# Patient Record
Sex: MALE | Race: WHITE
[De-identification: names, ages, dates, MRNs, and addresses within clinical notes are randomized per-mention and may not be internally consistent; named-entity substitution may affect disease eponyms.]

---

## 2019-03-26 ENCOUNTER — HOSPITAL ENCOUNTER (OUTPATIENT)
Age: 74
End: 2019-03-26
Payer: MEDICARE

## 2019-03-26 VITALS — BODY MASS INDEX: 26.7 KG/M2

## 2019-03-26 DIAGNOSIS — I25.10: Primary | ICD-10-CM

## 2019-03-26 PROCEDURE — 93306 TTE W/DOPPLER COMPLETE: CPT

## 2019-08-06 ENCOUNTER — HOSPITAL ENCOUNTER (OUTPATIENT)
Dept: HOSPITAL 100 - PSN | Age: 74
Discharge: HOME | End: 2019-08-06
Payer: MEDICARE

## 2019-08-06 VITALS — BODY MASS INDEX: 26.7 KG/M2

## 2019-08-06 DIAGNOSIS — J44.9: Primary | ICD-10-CM

## 2019-08-06 PROCEDURE — 94729 DIFFUSING CAPACITY: CPT

## 2019-08-06 PROCEDURE — 94726 PLETHYSMOGRAPHY LUNG VOLUMES: CPT

## 2019-08-06 PROCEDURE — 94060 EVALUATION OF WHEEZING: CPT

## 2019-08-08 ENCOUNTER — HOSPITAL ENCOUNTER (OUTPATIENT)
Age: 74
End: 2019-08-08
Payer: MEDICARE

## 2019-08-08 VITALS — BODY MASS INDEX: 26.7 KG/M2

## 2019-08-08 VITALS — OXYGEN SATURATION: 97 % | HEART RATE: 87 BPM

## 2019-08-08 DIAGNOSIS — J44.9: Primary | ICD-10-CM

## 2019-08-08 PROCEDURE — 94618 PULMONARY STRESS TESTING: CPT

## 2020-07-09 ENCOUNTER — HOSPITAL ENCOUNTER (OUTPATIENT)
Dept: HOSPITAL 100 - PSN | Age: 75
Discharge: HOME | End: 2020-07-09
Payer: MEDICARE

## 2020-07-09 VITALS — BODY MASS INDEX: 25.9 KG/M2 | BODY MASS INDEX: 26.2 KG/M2

## 2020-07-09 DIAGNOSIS — J44.9: Primary | ICD-10-CM

## 2020-07-09 PROCEDURE — 94729 DIFFUSING CAPACITY: CPT

## 2020-07-09 PROCEDURE — 94726 PLETHYSMOGRAPHY LUNG VOLUMES: CPT

## 2020-07-09 PROCEDURE — 94060 EVALUATION OF WHEEZING: CPT

## 2020-07-20 ENCOUNTER — HOSPITAL ENCOUNTER (OUTPATIENT)
Dept: HOSPITAL 100 - PSN | Age: 75
Discharge: HOME | End: 2020-07-20
Payer: MEDICARE

## 2020-07-20 VITALS — HEART RATE: 90 BPM | OXYGEN SATURATION: 98 %

## 2020-07-20 VITALS — BODY MASS INDEX: 26.9 KG/M2

## 2020-07-20 DIAGNOSIS — J44.9: Primary | ICD-10-CM

## 2020-07-20 PROCEDURE — 94618 PULMONARY STRESS TESTING: CPT

## 2021-08-03 ENCOUNTER — HOSPITAL ENCOUNTER (OUTPATIENT)
Age: 76
End: 2021-08-03
Payer: MEDICARE

## 2021-08-03 VITALS — BODY MASS INDEX: 26.1 KG/M2

## 2021-08-03 DIAGNOSIS — E78.00: Primary | ICD-10-CM

## 2021-08-03 LAB
ALANINE AMINOTRANSFER ALT/SGPT: 26 U/L (ref 16–61)
ALBUMIN SERPL-MCNC: 3.4 G/DL (ref 3.2–5)
ALKALINE PHOSPHATASE: 63 U/L (ref 45–117)
AST(SGOT): 14 U/L (ref 15–37)
BILIRUB DIRECT SERPL-MCNC: 0.12 MG/DL (ref 0–0.3)
CHOLEST SERPL-MCNC: 175 MG/DL
GLOBULIN: 3.9 G/DL (ref 2.2–4.2)
TRIGLYCERIDES: 148 MG/DL
VLDLC SERPL-MCNC: 30 MG/DL (ref 5–40)

## 2021-08-03 PROCEDURE — 36415 COLL VENOUS BLD VENIPUNCTURE: CPT

## 2021-08-03 PROCEDURE — 80061 LIPID PANEL: CPT

## 2021-08-03 PROCEDURE — 80076 HEPATIC FUNCTION PANEL: CPT

## 2022-04-26 ENCOUNTER — HOSPITAL ENCOUNTER (OUTPATIENT)
Dept: HOSPITAL 100 - CVS | Age: 77
Discharge: HOME | End: 2022-04-26
Payer: MEDICARE

## 2022-04-26 DIAGNOSIS — I25.10: Primary | ICD-10-CM

## 2022-04-26 DIAGNOSIS — E78.00: ICD-10-CM

## 2022-04-26 DIAGNOSIS — Z95.1: ICD-10-CM

## 2022-04-26 DIAGNOSIS — I48.0: ICD-10-CM

## 2022-04-26 DIAGNOSIS — Z98.61: ICD-10-CM

## 2022-04-26 PROCEDURE — 93017 CV STRESS TEST TRACING ONLY: CPT

## 2022-04-26 PROCEDURE — 93306 TTE W/DOPPLER COMPLETE: CPT

## 2022-04-26 PROCEDURE — A9500 TC99M SESTAMIBI: HCPCS

## 2022-04-26 PROCEDURE — 78452 HT MUSCLE IMAGE SPECT MULT: CPT

## 2022-04-26 PROCEDURE — A4216 STERILE WATER/SALINE, 10 ML: HCPCS

## 2022-07-07 ENCOUNTER — HOSPITAL ENCOUNTER (OUTPATIENT)
Age: 77
Discharge: HOME | End: 2022-07-07
Payer: MEDICARE

## 2022-07-07 DIAGNOSIS — I25.10: Primary | ICD-10-CM

## 2022-07-07 DIAGNOSIS — R27.0: ICD-10-CM

## 2022-07-07 PROCEDURE — 93880 EXTRACRANIAL BILAT STUDY: CPT

## 2023-03-06 ENCOUNTER — TELEPHONE (OUTPATIENT)
Dept: PRIMARY CARE | Facility: CLINIC | Age: 78
End: 2023-03-06
Payer: MEDICARE

## 2023-03-06 DIAGNOSIS — I10 PRIMARY HYPERTENSION: Primary | ICD-10-CM

## 2023-03-06 RX ORDER — LISINOPRIL AND HYDROCHLOROTHIAZIDE 20; 25 MG/1; MG/1
1 TABLET ORAL DAILY
Qty: 90 TABLET | Refills: 3 | Status: SHIPPED | OUTPATIENT
Start: 2023-03-06 | End: 2023-12-29

## 2023-03-06 NOTE — TELEPHONE ENCOUNTER
Needing 90 day refill of lisinopril with instructions of taking a whole pill daily and would like it for a year sent to [facility].

## 2023-04-14 ENCOUNTER — TELEPHONE (OUTPATIENT)
Dept: PRIMARY CARE | Facility: CLINIC | Age: 78
End: 2023-04-14
Payer: MEDICARE

## 2023-04-14 DIAGNOSIS — M81.0 AGE RELATED OSTEOPOROSIS, UNSPECIFIED PATHOLOGICAL FRACTURE PRESENCE: ICD-10-CM

## 2023-04-14 RX ORDER — DENOSUMAB 60 MG/ML
60 INJECTION SUBCUTANEOUS ONCE
Qty: 1 ML | Refills: 0 | Status: SHIPPED | OUTPATIENT
Start: 2023-04-14 | End: 2023-07-06 | Stop reason: SDUPTHER

## 2023-04-14 RX ORDER — DENOSUMAB 60 MG/ML
60 INJECTION SUBCUTANEOUS ONCE
COMMUNITY
Start: 2019-11-07 | End: 2023-04-14 | Stop reason: SDUPTHER

## 2023-04-21 ENCOUNTER — CLINICAL SUPPORT (OUTPATIENT)
Dept: PRIMARY CARE | Facility: CLINIC | Age: 78
End: 2023-04-21
Payer: MEDICARE

## 2023-04-21 DIAGNOSIS — M81.0 OSTEOPOROSIS, UNSPECIFIED OSTEOPOROSIS TYPE, UNSPECIFIED PATHOLOGICAL FRACTURE PRESENCE: ICD-10-CM

## 2023-04-21 NOTE — PROGRESS NOTES
Prolia injection 60mg/ml given Subcu. Right upper arm  Pt. Tolerated well  Pt. Brings own med.    Lot  [id number]  Exp 7/31/2025  NDC [id number]

## 2023-05-08 ENCOUNTER — TELEPHONE (OUTPATIENT)
Dept: PRIMARY CARE | Facility: CLINIC | Age: 78
End: 2023-05-08
Payer: MEDICARE

## 2023-05-25 PROBLEM — K22.4 INEFFECTIVE ESOPHAGEAL MOTILITY: Status: ACTIVE | Noted: 2023-05-25

## 2023-05-25 PROBLEM — M47.816 LUMBAR FACET ARTHROPATHY: Status: ACTIVE | Noted: 2023-05-25

## 2023-05-25 PROBLEM — K21.9 GERD (GASTROESOPHAGEAL REFLUX DISEASE): Status: ACTIVE | Noted: 2023-05-25

## 2023-05-25 PROBLEM — L82.1 SEBORRHEIC KERATOSIS: Status: ACTIVE | Noted: 2023-05-25

## 2023-05-25 PROBLEM — R73.03 PREDIABETES: Status: ACTIVE | Noted: 2023-05-25

## 2023-05-25 PROBLEM — M47.812 CERVICAL SPONDYLOSIS: Status: ACTIVE | Noted: 2023-05-25

## 2023-05-25 PROBLEM — I48.91 ATRIAL FIBRILLATION (MULTI): Status: ACTIVE | Noted: 2023-05-25

## 2023-05-25 PROBLEM — M81.0 OSTEOPOROSIS: Status: ACTIVE | Noted: 2023-05-25

## 2023-05-25 PROBLEM — E78.49 FAMILIAL HYPERLIPIDEMIA: Status: ACTIVE | Noted: 2023-05-25

## 2023-05-25 PROBLEM — R13.10 DYSPHAGIA: Status: ACTIVE | Noted: 2023-05-25

## 2023-05-25 PROBLEM — J43.2 CENTRILOBULAR EMPHYSEMA (MULTI): Status: ACTIVE | Noted: 2023-05-25

## 2023-05-25 PROBLEM — M76.821 POSTERIOR TIBIAL TENDINITIS OF RIGHT LOWER EXTREMITY: Status: ACTIVE | Noted: 2023-05-25

## 2023-05-25 PROBLEM — R09.02 HYPOXEMIA: Status: ACTIVE | Noted: 2023-05-25

## 2023-05-25 PROBLEM — M21.40 FLAT FOOT: Status: ACTIVE | Noted: 2023-05-25

## 2023-05-25 PROBLEM — C44.91 BASAL CELL CARCINOMA: Status: ACTIVE | Noted: 2023-05-25

## 2023-05-25 PROBLEM — G25.0 ESSENTIAL TREMOR: Status: ACTIVE | Noted: 2023-05-25

## 2023-05-25 PROBLEM — J44.9 COPD, SEVERE (MULTI): Status: ACTIVE | Noted: 2023-05-25

## 2023-05-25 PROBLEM — H40.9 GLAUCOMA: Status: ACTIVE | Noted: 2023-05-25

## 2023-05-25 RX ORDER — CALCIUM CARBONATE 200(500)MG
1 TABLET,CHEWABLE ORAL 2 TIMES DAILY
COMMUNITY
Start: 2019-12-07 | End: 2023-07-06 | Stop reason: ALTCHOICE

## 2023-05-25 RX ORDER — LATANOPROST 50 UG/ML
1 SOLUTION/ DROPS OPHTHALMIC NIGHTLY
COMMUNITY
Start: 2019-12-07

## 2023-05-25 RX ORDER — PRAVASTATIN SODIUM 40 MG/1
1 TABLET ORAL DAILY
COMMUNITY
Start: 2019-11-18 | End: 2023-12-29

## 2023-05-25 RX ORDER — IPRATROPIUM BROMIDE 42 UG/1
2 SPRAY, METERED NASAL 3 TIMES DAILY
COMMUNITY
Start: 2023-05-24 | End: 2023-06-16 | Stop reason: ALTCHOICE

## 2023-05-25 RX ORDER — PANTOPRAZOLE SODIUM 40 MG/1
1 TABLET, DELAYED RELEASE ORAL DAILY
COMMUNITY
Start: 2019-12-05 | End: 2024-02-06 | Stop reason: SDUPTHER

## 2023-05-25 RX ORDER — BACLOFEN 20 MG
1 TABLET ORAL DAILY
COMMUNITY
Start: 2019-12-07

## 2023-05-25 RX ORDER — NITROGLYCERIN 0.4 MG/1
0.4 TABLET SUBLINGUAL EVERY 5 MIN PRN
COMMUNITY
Start: 2019-12-07

## 2023-05-25 RX ORDER — METOPROLOL SUCCINATE 25 MG/1
1 TABLET, EXTENDED RELEASE ORAL DAILY
COMMUNITY
Start: 2019-12-07 | End: 2023-12-29

## 2023-05-30 ENCOUNTER — OFFICE VISIT (OUTPATIENT)
Dept: PRIMARY CARE | Facility: CLINIC | Age: 78
End: 2023-05-30
Payer: MEDICARE

## 2023-05-30 VITALS
SYSTOLIC BLOOD PRESSURE: 110 MMHG | HEART RATE: 48 BPM | HEIGHT: 72 IN | WEIGHT: 177.2 LBS | BODY MASS INDEX: 24 KG/M2 | DIASTOLIC BLOOD PRESSURE: 70 MMHG

## 2023-05-30 DIAGNOSIS — I48.91 ATRIAL FIBRILLATION, UNSPECIFIED TYPE (MULTI): ICD-10-CM

## 2023-05-30 DIAGNOSIS — R06.02 SHORTNESS OF BREATH ON EXERTION: ICD-10-CM

## 2023-05-30 DIAGNOSIS — R51.9 NONINTRACTABLE HEADACHE, UNSPECIFIED CHRONICITY PATTERN, UNSPECIFIED HEADACHE TYPE: Primary | ICD-10-CM

## 2023-05-30 DIAGNOSIS — J34.89 SINUS DRAINAGE: ICD-10-CM

## 2023-05-30 DIAGNOSIS — R06.02 SHORTNESS OF BREATH ON EXERTION: Primary | ICD-10-CM

## 2023-05-30 PROCEDURE — 93000 ELECTROCARDIOGRAM COMPLETE: CPT | Performed by: NURSE PRACTITIONER

## 2023-05-30 PROCEDURE — 3074F SYST BP LT 130 MM HG: CPT | Performed by: NURSE PRACTITIONER

## 2023-05-30 PROCEDURE — 1160F RVW MEDS BY RX/DR IN RCRD: CPT | Performed by: NURSE PRACTITIONER

## 2023-05-30 PROCEDURE — 1036F TOBACCO NON-USER: CPT | Performed by: NURSE PRACTITIONER

## 2023-05-30 PROCEDURE — 3078F DIAST BP <80 MM HG: CPT | Performed by: NURSE PRACTITIONER

## 2023-05-30 PROCEDURE — 99214 OFFICE O/P EST MOD 30 MIN: CPT | Performed by: NURSE PRACTITIONER

## 2023-05-30 PROCEDURE — 1159F MED LIST DOCD IN RCRD: CPT | Performed by: NURSE PRACTITIONER

## 2023-05-30 ASSESSMENT — ENCOUNTER SYMPTOMS
SHORTNESS OF BREATH: 1
SINUS PRESSURE: 0
NECK PAIN: 1
HEADACHES: 1

## 2023-05-30 NOTE — PROGRESS NOTES
"Subjective   Patient ID: Kenji Tavera is a 77 y.o. male who presents for Headache (Pt c/o headache for several days).  Sinus drainage \"forever\".  Seems to collect in chest and coughs a lot until seems to dislodge, often at night.  Has tried mucinex, zyrtec, Tylenol severe sinus. Tylenol seems to help a little except for cough. Didn't feel well on Zyrtec  Saw pulmonology and was given ipatropium bromide solution, prednisone and Amoxicillin but these didn't really seem to help. Discussed trying a different antihistamine/claritan and maybe different nasal steroid spray. Saline sprayis/christal pot may be helpful.    Has been having headaches since Mother's Day.   Went to [facility] and before he left felt horrible \"pain in chest\" Has history of esophageal problems. Had Barretts in the past and that went away.  Radiated into right jaw and right/posterior neck/head  Improved gradually over 3 days  No pain now unless bends over or does something strenuous then right posterior side of neck will occur and radiate into head. Sometimes will start just by walking. Resolves with rest. No pain at rest.   Some SOB with this/history COPD and the upper lobe of left lung has been removed. Uses O2 at night as needed.    Has a new cardiologist Dr Peralta in [place]. Last cardiology visit about 1 year ago with Dr. Espinoza. Has not seen Dr. Peralta yet. Previous Dr. Espinoza.     Headache   This is a new problem. The current episode started 1 to 4 weeks ago. The pain is located in the Left unilateral and occipital region. The pain does not radiate. The pain quality is not similar to prior headaches. The quality of the pain is described as sharp. The pain is at a severity of 8/10. The pain is moderate. Associated symptoms include neck pain. Pertinent negatives include no sinus pressure. Associated symptoms comments: Shortness of breath. The symptoms are aggravated by activity. He has tried nothing for the symptoms. His past medical " history is significant for sinus disease.       Review of Systems   HENT:  Positive for postnasal drip. Negative for sinus pressure.    Respiratory:  Positive for shortness of breath.    Cardiovascular:  Positive for chest pain.   Musculoskeletal:  Positive for neck pain.   Neurological:  Positive for headaches.       Objective   Physical Exam  Constitutional:       Appearance: Normal appearance.   HENT:      Head: Normocephalic.      Right Ear: Tympanic membrane normal.      Left Ear: Tympanic membrane normal.      Nose: Nose normal.      Right Sinus: No maxillary sinus tenderness or frontal sinus tenderness.      Left Sinus: No maxillary sinus tenderness or frontal sinus tenderness.      Mouth/Throat:      Pharynx: Oropharynx is clear.   Eyes:      Conjunctiva/sclera: Conjunctivae normal.   Neck:      Vascular: No carotid bruit.   Cardiovascular:      Rate and Rhythm: Normal rate. Rhythm irregular.   Pulmonary:      Effort: Pulmonary effort is normal.      Breath sounds: Normal breath sounds.   Abdominal:      General: Bowel sounds are normal.      Palpations: Abdomen is soft.   Musculoskeletal:         General: Tenderness: mild posterior upper right neck..      Cervical back: Neck supple. Tenderness (Right posterior upper neck.) present.   Lymphadenopathy:      Cervical: No cervical adenopathy.   Skin:     General: Skin is warm and dry.   Neurological:      Mental Status: He is alert.   Psychiatric:         Mood and Affect: Mood normal.         Assessment/Plan   Diagnoses and all orders for this visit:  Shortness of breath on exertion  -     ECG 12 lead (Clinic Performed)  Atrial fibrillation, unspecified type (CMS/HCC)  -     ECG 12 lead (Clinic Performed)     Discussed follow up 2 weeks to recheck, but prefers to wait until 7/6/23 and see Dr. Flores.  Suggestions for sinus drainage in HPI.  EKG read by Dr. Easley. No recent EKG available for comparison, but no obvious acute changes.   Advised to see his  Cardiologist in the next 1-2 weeks to evaluate. Thinks he may have an appointment.  Can try heat to area posterior neck 2-3 times a day.  Follow up/ER any concerns with worsening symptoms/changes.

## 2023-06-15 ENCOUNTER — HOSPITAL ENCOUNTER (EMERGENCY)
Age: 78
Discharge: HOME | End: 2023-06-15
Payer: MEDICARE

## 2023-06-15 VITALS
RESPIRATION RATE: 16 BRPM | OXYGEN SATURATION: 96 % | DIASTOLIC BLOOD PRESSURE: 69 MMHG | HEART RATE: 89 BPM | SYSTOLIC BLOOD PRESSURE: 127 MMHG

## 2023-06-15 VITALS
OXYGEN SATURATION: 96 % | RESPIRATION RATE: 18 BRPM | SYSTOLIC BLOOD PRESSURE: 134 MMHG | DIASTOLIC BLOOD PRESSURE: 66 MMHG

## 2023-06-15 VITALS — DIASTOLIC BLOOD PRESSURE: 88 MMHG | SYSTOLIC BLOOD PRESSURE: 144 MMHG

## 2023-06-15 VITALS — BODY MASS INDEX: 26.2 KG/M2

## 2023-06-15 VITALS — TEMPERATURE: 97.3 F | OXYGEN SATURATION: 96 % | RESPIRATION RATE: 24 BRPM | HEART RATE: 94 BPM

## 2023-06-15 DIAGNOSIS — J90: ICD-10-CM

## 2023-06-15 DIAGNOSIS — R60.0: ICD-10-CM

## 2023-06-15 DIAGNOSIS — Z79.52: ICD-10-CM

## 2023-06-15 DIAGNOSIS — Z79.01: ICD-10-CM

## 2023-06-15 DIAGNOSIS — I48.92: ICD-10-CM

## 2023-06-15 DIAGNOSIS — E78.00: ICD-10-CM

## 2023-06-15 DIAGNOSIS — I25.10: ICD-10-CM

## 2023-06-15 DIAGNOSIS — J44.1: Primary | ICD-10-CM

## 2023-06-15 DIAGNOSIS — R09.82: ICD-10-CM

## 2023-06-15 DIAGNOSIS — Z86.711: ICD-10-CM

## 2023-06-15 DIAGNOSIS — Z95.1: ICD-10-CM

## 2023-06-15 DIAGNOSIS — I10: ICD-10-CM

## 2023-06-15 DIAGNOSIS — Z87.891: ICD-10-CM

## 2023-06-15 LAB
ANION GAP: 5 (ref 5–15)
BNP,B-TYPE NATRIURETIC PEPTIDE: 351.1 PG/ML (ref 0–100)
BUN SERPL-MCNC: 24 MG/DL (ref 7–18)
BUN/CREAT RATIO: 17.6 RATIO (ref 10–20)
CALCIUM SERPL-MCNC: 9 MG/DL (ref 8.5–10.1)
CARBON DIOXIDE: 27 MMOL/L (ref 21–32)
CHLORIDE: 108 MMOL/L (ref 98–107)
DEPRECATED RDW RBC: 46.9 FL (ref 35.1–43.9)
ERYTHROCYTE [DISTWIDTH] IN BLOOD: 14 % (ref 11.6–14.6)
EST GLOM FILT RATE - AFR AMER: 65 ML/MIN (ref 60–?)
ESTIMATED CREATININE CLEARANCE: 48.45 ML/MIN
GLUCOSE: 117 MG/DL (ref 74–106)
HCT VFR BLD AUTO: 47.8 % (ref 40–54)
HEMOGLOBIN: 15 G/DL (ref 13–16.5)
HGB BLD-MCNC: 15 G/DL (ref 13–16.5)
IMMATURE GRANULOCYTES COUNT: 0.01 X10^3/UL (ref 0–0)
MCV RBC: 91.9 FL (ref 80–94)
MEAN CORP HGB CONC: 31.4 G/DL (ref 32–36)
MEAN PLATELET VOL.: 9.3 FL (ref 6.2–12)
NRBC FLAGGED BY ANALYZER: 0 % (ref 0–5)
PLATELET # BLD: 213 K/MM3 (ref 150–450)
PLATELET COUNT: 213 K/MM3 (ref 150–450)
POTASSIUM: 3.9 MMOL/L (ref 3.5–5.1)
RBC # BLD AUTO: 5.2 M/MM3 (ref 4.6–6.2)
RBC DISTRIBUTION WIDTH CV: 14 % (ref 11.6–14.6)
RBC DISTRIBUTION WIDTH SD: 46.9 FL (ref 35.1–43.9)
TROPONIN-I HS: 12 PG/ML (ref 3–78)
WBC # BLD AUTO: 5.8 K/MM3 (ref 4.4–11)
WHITE BLOOD COUNT: 5.8 K/MM3 (ref 4.4–11)

## 2023-06-15 PROCEDURE — 84484 ASSAY OF TROPONIN QUANT: CPT

## 2023-06-15 PROCEDURE — 99284 EMERGENCY DEPT VISIT MOD MDM: CPT

## 2023-06-15 PROCEDURE — A4216 STERILE WATER/SALINE, 10 ML: HCPCS

## 2023-06-15 PROCEDURE — 85025 COMPLETE CBC W/AUTO DIFF WBC: CPT

## 2023-06-15 PROCEDURE — 80048 BASIC METABOLIC PNL TOTAL CA: CPT

## 2023-06-15 PROCEDURE — 93005 ELECTROCARDIOGRAM TRACING: CPT

## 2023-06-15 PROCEDURE — 71046 X-RAY EXAM CHEST 2 VIEWS: CPT

## 2023-06-15 PROCEDURE — 83880 ASSAY OF NATRIURETIC PEPTIDE: CPT

## 2023-06-15 PROCEDURE — 94640 AIRWAY INHALATION TREATMENT: CPT

## 2023-06-16 RX ORDER — AZELASTINE 1 MG/ML
1 SPRAY, METERED NASAL 2 TIMES DAILY
COMMUNITY
End: 2024-01-10 | Stop reason: ALTCHOICE

## 2023-06-16 RX ORDER — FUROSEMIDE 20 MG/1
20 TABLET ORAL DAILY
COMMUNITY
End: 2023-07-06 | Stop reason: ALTCHOICE

## 2023-06-19 PROBLEM — I50.31 ACUTE DIASTOLIC CONGESTIVE HEART FAILURE (MULTI): Status: ACTIVE | Noted: 2023-06-19

## 2023-06-23 ENCOUNTER — HOSPITAL ENCOUNTER (OUTPATIENT)
Age: 78
Discharge: HOME | End: 2023-06-23
Payer: MEDICARE

## 2023-06-23 DIAGNOSIS — I48.0: Primary | ICD-10-CM

## 2023-06-23 DIAGNOSIS — I50.31: ICD-10-CM

## 2023-06-23 PROCEDURE — 93225 XTRNL ECG REC<48 HRS REC: CPT

## 2023-06-23 PROCEDURE — 93226 XTRNL ECG REC<48 HR SCAN A/R: CPT

## 2023-06-28 LAB
ANION GAP IN SER/PLAS: 9 MMOL/L (ref 10–20)
CALCIUM (MG/DL) IN SER/PLAS: 9.3 MG/DL (ref 8.6–10.3)
CARBON DIOXIDE, TOTAL (MMOL/L) IN SER/PLAS: 33 MMOL/L (ref 21–32)
CHLORIDE (MMOL/L) IN SER/PLAS: 104 MMOL/L (ref 98–107)
CREATININE (MG/DL) IN SER/PLAS: 0.96 MG/DL (ref 0.5–1.3)
GFR MALE: 81 ML/MIN/1.73M2
GLUCOSE (MG/DL) IN SER/PLAS: 100 MG/DL (ref 74–99)
POTASSIUM (MMOL/L) IN SER/PLAS: 4.2 MMOL/L (ref 3.5–5.3)
PROSTATE SPECIFIC AG (NG/ML) IN SER/PLAS: 4.38 NG/ML (ref 0–4)
SODIUM (MMOL/L) IN SER/PLAS: 142 MMOL/L (ref 136–145)
UREA NITROGEN (MG/DL) IN SER/PLAS: 25 MG/DL (ref 6–23)

## 2023-06-29 ENCOUNTER — HOSPITAL ENCOUNTER (OUTPATIENT)
Age: 78
Discharge: HOME | End: 2023-06-29
Payer: MEDICARE

## 2023-06-29 DIAGNOSIS — I50.31: ICD-10-CM

## 2023-06-29 DIAGNOSIS — I25.10: Primary | ICD-10-CM

## 2023-06-29 DIAGNOSIS — I48.92: ICD-10-CM

## 2023-06-29 LAB
ANION GAP: 3 (ref 5–15)
BUN SERPL-MCNC: 26 MG/DL (ref 7–18)
BUN/CREAT RATIO: 24.3 RATIO (ref 10–20)
CALCIUM SERPL-MCNC: 8.9 MG/DL (ref 8.5–10.1)
CARBON DIOXIDE: 31 MMOL/L (ref 21–32)
CHLORIDE: 105 MMOL/L (ref 98–107)
EST GLOM FILT RATE - AFR AMER: 86 ML/MIN (ref 60–?)
GLUCOSE: 54 MG/DL (ref 74–106)
POTASSIUM: 3.7 MMOL/L (ref 3.5–5.1)

## 2023-06-29 PROCEDURE — 80048 BASIC METABOLIC PNL TOTAL CA: CPT

## 2023-06-29 PROCEDURE — 93306 TTE W/DOPPLER COMPLETE: CPT

## 2023-06-29 PROCEDURE — 36415 COLL VENOUS BLD VENIPUNCTURE: CPT

## 2023-07-06 ENCOUNTER — OFFICE VISIT (OUTPATIENT)
Dept: PRIMARY CARE | Facility: CLINIC | Age: 78
End: 2023-07-06
Payer: MEDICARE

## 2023-07-06 VITALS
OXYGEN SATURATION: 98 % | HEART RATE: 65 BPM | DIASTOLIC BLOOD PRESSURE: 68 MMHG | SYSTOLIC BLOOD PRESSURE: 122 MMHG | BODY MASS INDEX: 23.66 KG/M2 | HEIGHT: 71 IN | WEIGHT: 169 LBS

## 2023-07-06 DIAGNOSIS — R09.02 HYPOXEMIA: ICD-10-CM

## 2023-07-06 DIAGNOSIS — R13.14 PHARYNGOESOPHAGEAL DYSPHAGIA: ICD-10-CM

## 2023-07-06 DIAGNOSIS — J43.2 CENTRILOBULAR EMPHYSEMA (MULTI): ICD-10-CM

## 2023-07-06 DIAGNOSIS — I50.31 ACUTE DIASTOLIC CONGESTIVE HEART FAILURE (MULTI): ICD-10-CM

## 2023-07-06 DIAGNOSIS — J31.0 CHRONIC RHINITIS: ICD-10-CM

## 2023-07-06 DIAGNOSIS — I10 PRIMARY HYPERTENSION: ICD-10-CM

## 2023-07-06 DIAGNOSIS — H40.9 GLAUCOMA OF BOTH EYES, UNSPECIFIED GLAUCOMA TYPE: ICD-10-CM

## 2023-07-06 DIAGNOSIS — I25.119 ATHEROSCLEROSIS OF NATIVE CORONARY ARTERY OF NATIVE HEART WITH ANGINA PECTORIS (CMS-HCC): ICD-10-CM

## 2023-07-06 DIAGNOSIS — M47.812 CERVICAL SPONDYLOSIS: ICD-10-CM

## 2023-07-06 DIAGNOSIS — J44.9 COPD, SEVERE (MULTI): ICD-10-CM

## 2023-07-06 DIAGNOSIS — M47.816 LUMBAR FACET ARTHROPATHY: ICD-10-CM

## 2023-07-06 DIAGNOSIS — R73.03 PREDIABETES: ICD-10-CM

## 2023-07-06 DIAGNOSIS — C44.519 BASAL CELL CARCINOMA (BCC) OF SKIN OF OTHER PART OF TORSO: ICD-10-CM

## 2023-07-06 DIAGNOSIS — K21.9 GASTROESOPHAGEAL REFLUX DISEASE WITHOUT ESOPHAGITIS: ICD-10-CM

## 2023-07-06 DIAGNOSIS — R97.20 ELEVATED PSA: Primary | ICD-10-CM

## 2023-07-06 DIAGNOSIS — M81.0 AGE RELATED OSTEOPOROSIS, UNSPECIFIED PATHOLOGICAL FRACTURE PRESENCE: ICD-10-CM

## 2023-07-06 DIAGNOSIS — E78.49 FAMILIAL HYPERLIPIDEMIA: ICD-10-CM

## 2023-07-06 DIAGNOSIS — I48.0 PAROXYSMAL ATRIAL FIBRILLATION (MULTI): ICD-10-CM

## 2023-07-06 PROBLEM — K22.4 INEFFECTIVE ESOPHAGEAL MOTILITY: Status: RESOLVED | Noted: 2023-05-25 | Resolved: 2023-07-06

## 2023-07-06 PROBLEM — M21.40 FLAT FOOT: Status: RESOLVED | Noted: 2023-05-25 | Resolved: 2023-07-06

## 2023-07-06 PROCEDURE — 93000 ELECTROCARDIOGRAM COMPLETE: CPT | Performed by: FAMILY MEDICINE

## 2023-07-06 PROCEDURE — 3078F DIAST BP <80 MM HG: CPT | Performed by: FAMILY MEDICINE

## 2023-07-06 PROCEDURE — 1159F MED LIST DOCD IN RCRD: CPT | Performed by: FAMILY MEDICINE

## 2023-07-06 PROCEDURE — 3074F SYST BP LT 130 MM HG: CPT | Performed by: FAMILY MEDICINE

## 2023-07-06 PROCEDURE — 1160F RVW MEDS BY RX/DR IN RCRD: CPT | Performed by: FAMILY MEDICINE

## 2023-07-06 PROCEDURE — 1036F TOBACCO NON-USER: CPT | Performed by: FAMILY MEDICINE

## 2023-07-06 PROCEDURE — 99214 OFFICE O/P EST MOD 30 MIN: CPT | Performed by: FAMILY MEDICINE

## 2023-07-06 RX ORDER — DENOSUMAB 60 MG/ML
60 INJECTION SUBCUTANEOUS ONCE
Qty: 1 ML | Refills: 0 | Status: SHIPPED | OUTPATIENT
Start: 2023-07-06 | End: 2023-10-11 | Stop reason: SDUPTHER

## 2023-07-06 RX ORDER — MINERAL OIL
180 ENEMA (ML) RECTAL DAILY
COMMUNITY
End: 2024-01-10 | Stop reason: ALTCHOICE

## 2023-07-06 RX ORDER — FUROSEMIDE 40 MG/1
40 TABLET ORAL DAILY
COMMUNITY
End: 2024-01-10 | Stop reason: ALTCHOICE

## 2023-07-06 ASSESSMENT — PATIENT HEALTH QUESTIONNAIRE - PHQ9
1. LITTLE INTEREST OR PLEASURE IN DOING THINGS: NOT AT ALL
2. FEELING DOWN, DEPRESSED OR HOPELESS: NOT AT ALL
SUM OF ALL RESPONSES TO PHQ9 QUESTIONS 1 AND 2: 0

## 2023-07-06 NOTE — PROGRESS NOTES
Subjective   Patient ID: Kenji Tavera is a 77 y.o. male who presents for Med Management (Multiple concerns).    HPI   [facility] - Dr Sandoval - no recent visit or worrisome lesions. Lesions on back removed by me     Atrial fibrillation and chronic rhinitis - no chest pain or racing. In June had a lot of swelling and chest congestion.  Lasix 40 seems to have resolved. Was put on a nasal spray also for nasal congestion and got lightheaded. Had a fall with facial contusion  Put on nasal Azelastine and Allegra and treated sinusitis with antibiotics. That has improved.    To try cardioversion in a few weeks as Holter shows in Afib all the time.  ECHO reported OK. Dr Espinoza did a normal stress test before surgery in May 2022.      GERD and Dyphagia - Vera esophagus not seen on last scope. Does not have esophagitis to explain the pain in the chest with work. Still on Pantoprazole. Felt to be spasm but amlodipine did not help after awhile. Worse with water or food at times.  OK recently . No melena or hematochezia      Carotid Bruit per Dr Espinoza. Doppler in July 2022. MIld on left      Cervical spondylosis - has been good after injection per Dr Gloria      COPD, severe and emphysema - Trelegy seems to work. Had daytime oxygen levels good but on April 26, 2018 had nocturnal down to 87 for 8 minutes. So on hs oxygen at hs. He checks at home and stays in 90s. Dr Edmond's office a few weeks ago.      Coronary artery disease - No Chest pain except as above with esophagus, palpitations, numbness, weakness, claudications, or double vision/ loss of vision. He is active with stairs.     Elevated PSA up to 4.38 from 3.73. No blood      Familial hyperlipidemia - Med works well without side effects. Good last  time.      Glaucoma - on drops and monitoring with Dr Harper at [facility] in October      Osteoporosis and History of compression fracture of spine seems to be pretty decent. Occasional Tylenol but CBD oil seems to do better     "  History of lung cancer (2000) - no blood. Has a cough from the PND and mucus better with Allegra.  Dr Guzman had stopped his lisinopril to see if that makes a difference but it did not. Had allergy testing. Allergy to cockroaches. Stopped smoking more than 15 years ago. Last CT was in 4/9/18. Dr Edmond. JOSIE      History of pulmonary embolus (PE) - on Xarelto for life. No new dyspnea or pain.      Hypertension - Med works well without side effects. Looks good off of amlodipine .      Prediabetes - No polyphagia, polydipsia, polyuria, or non healing sores. A1C 6.1 in December. FBS this time 100.      Lumbar facet arthropathy Tylenol helps this but not needed recently as CBD works well      Osteoporosis - still on Prolia and doing well and bone density 7/7/21. No change.      Seborrheic keratosis no irritated skin lesions. But had BCC removed last year      Tremor - finds he is shaking a lot when eating. Bothering eating and writing. No meds. Discussed medication options and tried Primidone helped only a little and made him dizzy.      Scope 5/1/18  PSA 12/19/22 has increased from 3.73 to 4.38   AAA screen 6/23/20     Review of Systems    Objective   /68 (BP Location: Left arm, Patient Position: Sitting)   Pulse 65   Ht 1.803 m (5' 11\")   Wt 76.7 kg (169 lb)   SpO2 98%   BMI 23.57 kg/m²     Physical Exam  Vitals reviewed.   Constitutional:       General: He is not in acute distress.     Appearance: Normal appearance.   HENT:      Head: Normocephalic.      Right Ear: Tympanic membrane normal.      Left Ear: Tympanic membrane normal.      Nose: Nose normal.      Mouth/Throat:      Pharynx: Oropharynx is clear.   Eyes:      Extraocular Movements: Extraocular movements intact.      Conjunctiva/sclera: Conjunctivae normal.      Pupils: Pupils are equal, round, and reactive to light.   Neck:      Vascular: No carotid bruit.   Cardiovascular:      Rate and Rhythm: Normal rate. Frequent Extrasystoles (seems to be " sinus with premature beats every 4-5 beats) are present.     Pulses: Normal pulses.      Heart sounds: Normal heart sounds. No murmur heard.  Pulmonary:      Effort: Pulmonary effort is normal. No respiratory distress.      Breath sounds: Normal breath sounds.   Abdominal:      General: Abdomen is flat. Bowel sounds are normal. There is no distension.      Palpations: Abdomen is soft. There is no mass.      Tenderness: There is no abdominal tenderness.      Comments: Diastasis recti    Musculoskeletal:      Cervical back: Normal range of motion and neck supple. No tenderness.   Lymphadenopathy:      Cervical: No cervical adenopathy.   Skin:     General: Skin is warm and dry.      Findings: No rash.   Neurological:      General: No focal deficit present.      Mental Status: He is alert and oriented to person, place, and time.   Psychiatric:         Mood and Affect: Mood normal.         Thought Content: Thought content normal.         Judgment: Judgment normal.       EKG shows A fib and PVCs.  Changes of old MI.   Assessment/Plan   Problem List Items Addressed This Visit       Acute diastolic congestive heart failure (CMS/HCC)    Relevant Orders    Comprehensive Metabolic Panel    CBC    Atrial fibrillation (CMS/HCC)    Relevant Orders    ECG 12 lead (Clinic Performed)    Basal cell carcinoma    Centrilobular emphysema (CMS/HCC)    Cervical spondylosis    Chronic rhinitis    COPD, severe (CMS/HCC)    Relevant Orders    Follow Up In Primary Care - Established    Dysphagia    Elevated PSA - Primary    Relevant Orders    PSA, total and free    Familial hyperlipidemia    Relevant Orders    Lipid Panel    GERD (gastroesophageal reflux disease)    Glaucoma    Hypertension    Hypoxemia    Lumbar facet arthropathy    Osteoporosis    Relevant Medications    denosumab (Prolia) 60 mg/mL syringe    Prediabetes    Relevant Orders    Hemoglobin A1C

## 2023-07-19 LAB
ANION GAP: 6 (ref 5–15)
BUN SERPL-MCNC: 22 MG/DL (ref 7–18)
BUN/CREAT RATIO: 19.5 RATIO (ref 10–20)
CALCIUM SERPL-MCNC: 9 MG/DL (ref 8.5–10.1)
CARBON DIOXIDE: 28 MMOL/L (ref 21–32)
CHLORIDE: 105 MMOL/L (ref 98–107)
EST GLOM FILT RATE - AFR AMER: 81 ML/MIN (ref 60–?)
ESTIMATED CREATININE CLEARANCE: 58.31 ML/MIN
GLUCOSE: 147 MG/DL (ref 74–106)
POTASSIUM: 3.9 MMOL/L (ref 3.5–5.1)
PROTHROMBIN TIME (PROTIME)PT.: 19.4 SECONDS (ref 11.7–14.9)

## 2023-07-24 ENCOUNTER — HOSPITAL ENCOUNTER (OUTPATIENT)
Age: 78
Discharge: HOME | End: 2023-07-24
Payer: MEDICARE

## 2023-07-24 VITALS — BODY MASS INDEX: 25.2 KG/M2

## 2023-07-24 DIAGNOSIS — I48.91: Primary | ICD-10-CM

## 2023-07-24 DIAGNOSIS — Z79.01: ICD-10-CM

## 2023-07-24 DIAGNOSIS — Z95.1: ICD-10-CM

## 2023-07-24 PROCEDURE — 85610 PROTHROMBIN TIME: CPT

## 2023-07-24 PROCEDURE — 92960 CARDIOVERSION ELECTRIC EXT: CPT

## 2023-07-24 PROCEDURE — 93005 ELECTROCARDIOGRAM TRACING: CPT

## 2023-07-24 PROCEDURE — 36415 COLL VENOUS BLD VENIPUNCTURE: CPT

## 2023-07-24 PROCEDURE — 80048 BASIC METABOLIC PNL TOTAL CA: CPT

## 2023-09-14 ENCOUNTER — TELEPHONE (OUTPATIENT)
Dept: PRIMARY CARE | Facility: CLINIC | Age: 78
End: 2023-09-14
Payer: MEDICARE

## 2023-10-11 ENCOUNTER — TELEPHONE (OUTPATIENT)
Dept: PRIMARY CARE | Facility: CLINIC | Age: 78
End: 2023-10-11
Payer: MEDICARE

## 2023-10-11 DIAGNOSIS — M81.0 AGE RELATED OSTEOPOROSIS, UNSPECIFIED PATHOLOGICAL FRACTURE PRESENCE: ICD-10-CM

## 2023-10-11 RX ORDER — DENOSUMAB 60 MG/ML
60 INJECTION SUBCUTANEOUS ONCE
Qty: 1 ML | Refills: 0 | Status: SHIPPED | OUTPATIENT
Start: 2023-10-11 | End: 2024-05-08 | Stop reason: SDUPTHER

## 2023-10-17 ENCOUNTER — HOSPITAL ENCOUNTER (OUTPATIENT)
Dept: HOSPITAL 100 - LAB | Age: 78
Discharge: HOME | End: 2023-10-17
Payer: MEDICARE

## 2023-10-17 DIAGNOSIS — I10: Primary | ICD-10-CM

## 2023-10-17 DIAGNOSIS — Z79.899: ICD-10-CM

## 2023-10-17 LAB
ALANINE AMINOTRANSFER ALT/SGPT: 26 U/L (ref 16–61)
ALBUMIN SERPL-MCNC: 3.8 G/DL (ref 3.2–5)
ALKALINE PHOSPHATASE: 62 U/L (ref 45–117)
AST(SGOT): 18 U/L (ref 15–37)
BILIRUB DIRECT SERPL-MCNC: 0.19 MG/DL (ref 0–0.3)
FREE T3: 1 PG/ML (ref 2.18–3.98)
GLOBULIN: 3.4 G/DL (ref 2.2–4.2)

## 2023-10-17 PROCEDURE — 84481 FREE ASSAY (FT-3): CPT

## 2023-10-17 PROCEDURE — 36415 COLL VENOUS BLD VENIPUNCTURE: CPT

## 2023-10-17 PROCEDURE — 80076 HEPATIC FUNCTION PANEL: CPT

## 2023-10-17 PROCEDURE — 84443 ASSAY THYROID STIM HORMONE: CPT

## 2023-10-17 PROCEDURE — 84439 ASSAY OF FREE THYROXINE: CPT

## 2023-10-17 RX ORDER — AMIODARONE HYDROCHLORIDE 200 MG/1
100 TABLET ORAL DAILY
COMMUNITY
Start: 2023-09-26 | End: 2024-01-10 | Stop reason: ALTCHOICE

## 2023-10-25 PROBLEM — G47.10 DAYTIME HYPERSOMNIA: Status: ACTIVE | Noted: 2023-10-25

## 2023-10-26 ENCOUNTER — CLINICAL SUPPORT (OUTPATIENT)
Dept: PRIMARY CARE | Facility: CLINIC | Age: 78
End: 2023-10-26
Payer: MEDICARE

## 2023-10-26 DIAGNOSIS — M81.0 OSTEOPOROSIS WITHOUT CURRENT PATHOLOGICAL FRACTURE, UNSPECIFIED OSTEOPOROSIS TYPE: ICD-10-CM

## 2023-10-26 PROCEDURE — 96372 THER/PROPH/DIAG INJ SC/IM: CPT | Performed by: FAMILY MEDICINE

## 2023-10-26 NOTE — PROGRESS NOTES
Patient here for nurse visit for 6 month Prolia Injection  Prolia 60mg given subcu left upper arm  Patient tolerated well and no adverse reaction.  Next injection due around April 26,2024

## 2023-11-06 ENCOUNTER — HOSPITAL ENCOUNTER (OUTPATIENT)
Dept: HOSPITAL 100 - SL | Age: 78
Discharge: HOME | End: 2023-11-06
Payer: MEDICARE

## 2023-11-06 DIAGNOSIS — G47.10: Primary | ICD-10-CM

## 2023-11-06 PROCEDURE — 95810 POLYSOM 6/> YRS 4/> PARAM: CPT

## 2023-11-13 ENCOUNTER — HOSPITAL ENCOUNTER (OUTPATIENT)
Dept: HOSPITAL 100 - LAB | Age: 78
Discharge: HOME | End: 2023-11-13
Payer: MEDICARE

## 2023-11-13 DIAGNOSIS — Z79.899: ICD-10-CM

## 2023-11-13 DIAGNOSIS — E03.9: Primary | ICD-10-CM

## 2023-11-13 LAB — FREE T3: 0.8 PG/ML (ref 2.18–3.98)

## 2023-11-13 PROCEDURE — 84443 ASSAY THYROID STIM HORMONE: CPT

## 2023-11-13 PROCEDURE — 84439 ASSAY OF FREE THYROXINE: CPT

## 2023-11-13 PROCEDURE — 84481 FREE ASSAY (FT-3): CPT

## 2023-11-13 PROCEDURE — 36415 COLL VENOUS BLD VENIPUNCTURE: CPT

## 2023-11-14 RX ORDER — AMLODIPINE BESYLATE 5 MG/1
5 TABLET ORAL DAILY PRN
COMMUNITY
Start: 2023-10-23

## 2023-11-15 ENCOUNTER — TELEPHONE (OUTPATIENT)
Dept: PRIMARY CARE | Facility: CLINIC | Age: 78
End: 2023-11-15
Payer: MEDICARE

## 2023-11-15 DIAGNOSIS — E03.9 ACQUIRED HYPOTHYROIDISM: Primary | ICD-10-CM

## 2023-11-15 RX ORDER — LEVOTHYROXINE SODIUM 50 UG/1
TABLET ORAL
Qty: 30 TABLET | Refills: 1 | Status: SHIPPED | OUTPATIENT
Start: 2023-11-15 | End: 2024-01-15 | Stop reason: SDUPTHER

## 2023-11-15 NOTE — TELEPHONE ENCOUNTER
Pc to patient and let him know Dr. Flores sent in Levothyroxine Rx to [facility].  He's to take 1/2 tab daily x8 days then go to 1 tab daily.  Will have him recheck his thyroid with his other labs prior to his appt. In January.  Patient verbalized understanding.

## 2023-11-15 NOTE — TELEPHONE ENCOUNTER
Needing refill of stating the cardiologist took him off the amiodarone. They told him he needs to contact his PCP about getting thyroid medication. If it will be short term can be sent to [facility], if it will be long term it can be sent to [facility].

## 2023-12-27 DIAGNOSIS — E78.49 FAMILIAL HYPERLIPIDEMIA: ICD-10-CM

## 2023-12-27 DIAGNOSIS — I10 PRIMARY HYPERTENSION: ICD-10-CM

## 2023-12-27 DIAGNOSIS — I48.91 ATRIAL FIBRILLATION, UNSPECIFIED TYPE (MULTI): Primary | ICD-10-CM

## 2023-12-29 RX ORDER — LISINOPRIL AND HYDROCHLOROTHIAZIDE 20; 25 MG/1; MG/1
1 TABLET ORAL DAILY
Qty: 90 TABLET | Refills: 3 | Status: SHIPPED | OUTPATIENT
Start: 2023-12-29 | End: 2024-12-28

## 2023-12-29 RX ORDER — METOPROLOL SUCCINATE 25 MG/1
25 TABLET, EXTENDED RELEASE ORAL DAILY
Qty: 90 TABLET | Refills: 3 | Status: SHIPPED | OUTPATIENT
Start: 2023-12-29

## 2023-12-29 RX ORDER — RIVAROXABAN 20 MG/1
20 TABLET, FILM COATED ORAL DAILY
Qty: 90 TABLET | Refills: 3 | Status: SHIPPED | OUTPATIENT
Start: 2023-12-29

## 2023-12-29 RX ORDER — PRAVASTATIN SODIUM 40 MG/1
40 TABLET ORAL DAILY
Qty: 90 TABLET | Refills: 3 | Status: SHIPPED | OUTPATIENT
Start: 2023-12-29

## 2024-01-05 ENCOUNTER — LAB (OUTPATIENT)
Dept: LAB | Facility: LAB | Age: 79
End: 2024-01-05
Payer: MEDICARE

## 2024-01-05 DIAGNOSIS — R97.20 ELEVATED PSA: ICD-10-CM

## 2024-01-05 DIAGNOSIS — E03.9 ACQUIRED HYPOTHYROIDISM: ICD-10-CM

## 2024-01-05 DIAGNOSIS — R79.89 ELEVATED TSH: Primary | ICD-10-CM

## 2024-01-05 DIAGNOSIS — I50.31 ACUTE DIASTOLIC CONGESTIVE HEART FAILURE (MULTI): ICD-10-CM

## 2024-01-05 DIAGNOSIS — R79.89 ELEVATED TSH: ICD-10-CM

## 2024-01-05 DIAGNOSIS — E78.49 FAMILIAL HYPERLIPIDEMIA: ICD-10-CM

## 2024-01-05 DIAGNOSIS — R73.03 PREDIABETES: ICD-10-CM

## 2024-01-05 LAB
ALBUMIN SERPL BCP-MCNC: 4.3 G/DL (ref 3.4–5)
ALP SERPL-CCNC: 56 U/L (ref 33–136)
ALT SERPL W P-5'-P-CCNC: 20 U/L (ref 10–52)
ANION GAP SERPL CALC-SCNC: 12 MMOL/L (ref 10–20)
AST SERPL W P-5'-P-CCNC: 20 U/L (ref 9–39)
BILIRUB SERPL-MCNC: 0.6 MG/DL (ref 0–1.2)
BUN SERPL-MCNC: 29 MG/DL (ref 6–23)
CALCIUM SERPL-MCNC: 9.4 MG/DL (ref 8.6–10.3)
CHLORIDE SERPL-SCNC: 104 MMOL/L (ref 98–107)
CHOLEST SERPL-MCNC: 197 MG/DL (ref 0–199)
CHOLESTEROL/HDL RATIO: 3.3
CO2 SERPL-SCNC: 31 MMOL/L (ref 21–32)
CREAT SERPL-MCNC: 1.1 MG/DL (ref 0.5–1.3)
ERYTHROCYTE [DISTWIDTH] IN BLOOD BY AUTOMATED COUNT: 13.3 % (ref 11.5–14.5)
EST. AVERAGE GLUCOSE BLD GHB EST-MCNC: 128 MG/DL
GFR SERPL CREATININE-BSD FRML MDRD: 69 ML/MIN/1.73M*2
GLUCOSE SERPL-MCNC: 100 MG/DL (ref 74–99)
HBA1C MFR BLD: 6.1 %
HCT VFR BLD AUTO: 45.6 % (ref 41–52)
HDLC SERPL-MCNC: 60 MG/DL
HGB BLD-MCNC: 14.5 G/DL (ref 13.5–17.5)
LDLC SERPL CALC-MCNC: 111 MG/DL
MCH RBC QN AUTO: 30.6 PG (ref 26–34)
MCHC RBC AUTO-ENTMCNC: 31.8 G/DL (ref 32–36)
MCV RBC AUTO: 96 FL (ref 80–100)
NON HDL CHOLESTEROL: 137 MG/DL (ref 0–149)
NRBC BLD-RTO: 0 /100 WBCS (ref 0–0)
PLATELET # BLD AUTO: 187 X10*3/UL (ref 150–450)
POTASSIUM SERPL-SCNC: 3.7 MMOL/L (ref 3.5–5.3)
PROT SERPL-MCNC: 7 G/DL (ref 6.4–8.2)
RBC # BLD AUTO: 4.74 X10*6/UL (ref 4.5–5.9)
SODIUM SERPL-SCNC: 143 MMOL/L (ref 136–145)
T4 FREE SERPL-MCNC: 1.1 NG/DL (ref 0.61–1.12)
TRIGL SERPL-MCNC: 132 MG/DL (ref 0–149)
TSH SERPL-ACNC: 146 MIU/L (ref 0.44–3.98)
VLDL: 26 MG/DL (ref 0–40)
WBC # BLD AUTO: 4.5 X10*3/UL (ref 4.4–11.3)

## 2024-01-05 PROCEDURE — 83036 HEMOGLOBIN GLYCOSYLATED A1C: CPT

## 2024-01-05 PROCEDURE — 36415 COLL VENOUS BLD VENIPUNCTURE: CPT

## 2024-01-05 PROCEDURE — 84153 ASSAY OF PSA TOTAL: CPT

## 2024-01-05 PROCEDURE — 80053 COMPREHEN METABOLIC PANEL: CPT

## 2024-01-05 PROCEDURE — 84439 ASSAY OF FREE THYROXINE: CPT

## 2024-01-05 PROCEDURE — 84154 ASSAY OF PSA FREE: CPT

## 2024-01-05 PROCEDURE — 85027 COMPLETE CBC AUTOMATED: CPT

## 2024-01-05 PROCEDURE — 84443 ASSAY THYROID STIM HORMONE: CPT

## 2024-01-05 PROCEDURE — 80061 LIPID PANEL: CPT

## 2024-01-07 LAB
PSA FREE MFR SERPL: 13 %
PSA FREE SERPL-MCNC: 0.7 NG/ML
PSA SERPL IA-MCNC: 5.4 NG/ML (ref 0–4)

## 2024-01-10 ENCOUNTER — OFFICE VISIT (OUTPATIENT)
Dept: PRIMARY CARE | Facility: CLINIC | Age: 79
End: 2024-01-10
Payer: MEDICARE

## 2024-01-10 ENCOUNTER — LAB (OUTPATIENT)
Dept: LAB | Facility: LAB | Age: 79
End: 2024-01-10
Payer: MEDICARE

## 2024-01-10 VITALS
OXYGEN SATURATION: 96 % | WEIGHT: 186 LBS | HEART RATE: 64 BPM | SYSTOLIC BLOOD PRESSURE: 128 MMHG | BODY MASS INDEX: 26.04 KG/M2 | HEIGHT: 71 IN | DIASTOLIC BLOOD PRESSURE: 80 MMHG

## 2024-01-10 DIAGNOSIS — J31.0 CHRONIC RHINITIS: ICD-10-CM

## 2024-01-10 DIAGNOSIS — L82.1 SEBORRHEIC KERATOSIS: ICD-10-CM

## 2024-01-10 DIAGNOSIS — R09.02 HYPOXEMIA: ICD-10-CM

## 2024-01-10 DIAGNOSIS — I10 PRIMARY HYPERTENSION: ICD-10-CM

## 2024-01-10 DIAGNOSIS — J44.9 COPD, SEVERE (MULTI): ICD-10-CM

## 2024-01-10 DIAGNOSIS — I48.0 PAROXYSMAL ATRIAL FIBRILLATION (MULTI): ICD-10-CM

## 2024-01-10 DIAGNOSIS — E78.49 FAMILIAL HYPERLIPIDEMIA: ICD-10-CM

## 2024-01-10 DIAGNOSIS — R13.14 PHARYNGOESOPHAGEAL DYSPHAGIA: ICD-10-CM

## 2024-01-10 DIAGNOSIS — M47.812 CERVICAL SPONDYLOSIS: ICD-10-CM

## 2024-01-10 DIAGNOSIS — R97.20 ELEVATED PSA: ICD-10-CM

## 2024-01-10 DIAGNOSIS — Z86.711 HISTORY OF PULMONARY EMBOLISM: ICD-10-CM

## 2024-01-10 DIAGNOSIS — Z00.00 ROUTINE GENERAL MEDICAL EXAMINATION AT HEALTH CARE FACILITY: Primary | ICD-10-CM

## 2024-01-10 DIAGNOSIS — H40.9 GLAUCOMA OF BOTH EYES, UNSPECIFIED GLAUCOMA TYPE: ICD-10-CM

## 2024-01-10 DIAGNOSIS — E03.9 ACQUIRED HYPOTHYROIDISM: ICD-10-CM

## 2024-01-10 DIAGNOSIS — G25.0 ESSENTIAL TREMOR: ICD-10-CM

## 2024-01-10 DIAGNOSIS — M81.0 OSTEOPOROSIS WITHOUT CURRENT PATHOLOGICAL FRACTURE, UNSPECIFIED OSTEOPOROSIS TYPE: ICD-10-CM

## 2024-01-10 DIAGNOSIS — C44.519 BASAL CELL CARCINOMA (BCC) OF SKIN OF OTHER PART OF TORSO: ICD-10-CM

## 2024-01-10 DIAGNOSIS — J43.2 CENTRILOBULAR EMPHYSEMA (MULTI): ICD-10-CM

## 2024-01-10 DIAGNOSIS — I25.119 ATHEROSCLEROSIS OF NATIVE CORONARY ARTERY OF NATIVE HEART WITH ANGINA PECTORIS (CMS-HCC): ICD-10-CM

## 2024-01-10 DIAGNOSIS — K21.9 GASTROESOPHAGEAL REFLUX DISEASE WITHOUT ESOPHAGITIS: ICD-10-CM

## 2024-01-10 DIAGNOSIS — R73.03 PREDIABETES: ICD-10-CM

## 2024-01-10 PROBLEM — I50.31 ACUTE DIASTOLIC CONGESTIVE HEART FAILURE (MULTI): Status: RESOLVED | Noted: 2023-06-19 | Resolved: 2024-01-10

## 2024-01-10 PROBLEM — R09.89 CAROTID BRUIT: Status: ACTIVE | Noted: 2023-06-19

## 2024-01-10 PROBLEM — I34.1 MITRAL VALVE PROLAPSE: Status: ACTIVE | Noted: 2023-06-19

## 2024-01-10 PROBLEM — R51.9 HEADACHE: Status: ACTIVE | Noted: 2024-01-10

## 2024-01-10 PROCEDURE — 3074F SYST BP LT 130 MM HG: CPT | Performed by: FAMILY MEDICINE

## 2024-01-10 PROCEDURE — 1159F MED LIST DOCD IN RCRD: CPT | Performed by: FAMILY MEDICINE

## 2024-01-10 PROCEDURE — 1036F TOBACCO NON-USER: CPT | Performed by: FAMILY MEDICINE

## 2024-01-10 PROCEDURE — 3079F DIAST BP 80-89 MM HG: CPT | Performed by: FAMILY MEDICINE

## 2024-01-10 PROCEDURE — G0439 PPPS, SUBSEQ VISIT: HCPCS | Performed by: FAMILY MEDICINE

## 2024-01-10 PROCEDURE — 1170F FXNL STATUS ASSESSED: CPT | Performed by: FAMILY MEDICINE

## 2024-01-10 PROCEDURE — 36415 COLL VENOUS BLD VENIPUNCTURE: CPT

## 2024-01-10 PROCEDURE — 84480 ASSAY TRIIODOTHYRONINE (T3): CPT

## 2024-01-10 PROCEDURE — 1160F RVW MEDS BY RX/DR IN RCRD: CPT | Performed by: FAMILY MEDICINE

## 2024-01-10 PROCEDURE — 99214 OFFICE O/P EST MOD 30 MIN: CPT | Performed by: FAMILY MEDICINE

## 2024-01-10 ASSESSMENT — PATIENT HEALTH QUESTIONNAIRE - PHQ9
SUM OF ALL RESPONSES TO PHQ9 QUESTIONS 1 AND 2: 0
2. FEELING DOWN, DEPRESSED OR HOPELESS: NOT AT ALL
SUM OF ALL RESPONSES TO PHQ9 QUESTIONS 1 AND 2: 0
1. LITTLE INTEREST OR PLEASURE IN DOING THINGS: NOT AT ALL
2. FEELING DOWN, DEPRESSED OR HOPELESS: NOT AT ALL
1. LITTLE INTEREST OR PLEASURE IN DOING THINGS: NOT AT ALL

## 2024-01-10 ASSESSMENT — ACTIVITIES OF DAILY LIVING (ADL)
TAKING_MEDICATION: INDEPENDENT
MANAGING_FINANCES: INDEPENDENT
GROCERY_SHOPPING: INDEPENDENT
DOING_HOUSEWORK: INDEPENDENT
DRESSING: INDEPENDENT
BATHING: INDEPENDENT

## 2024-01-10 ASSESSMENT — ENCOUNTER SYMPTOMS
DEPRESSION: 0
OCCASIONAL FEELINGS OF UNSTEADINESS: 1
LOSS OF SENSATION IN FEET: 0

## 2024-01-10 NOTE — PATIENT INSTRUCTIONS

## 2024-01-10 NOTE — PROGRESS NOTES
Subjective   Reason for Visit: Kenji Tavera is an 78 y.o. male here for a Medicare Wellness visit.   Past Medical, Surgical, and Family History reviewed and updated in chart.    Reviewed all medications by prescribing practitioner or clinical pharmacist (such as prescriptions, OTCs, herbal therapies and supplements) and documented in the medical record.    HPI  [facility] - Dr Sandoval - no recent visit or worrisome lesions. Lesions on back removed by me     Atrial fibrillation - no chest pain or racing even when in afib.  Was on Amiodarone after cardioversioin.  Did not tolerate that well so off of it now.  No edema or chest congestion. No longer needs Lasix 40.   ECHO reported OK. Dr Espinoza did a normal stress test before surgery in May 2022.     Chronic Rhinitis - Was put on a nasal spray also for nasal congestion and got lightheaded. Had a fall with facial contusion  Put on nasal Azelastine and Allegra and treated sinusitis with antibiotics. That has improved. So on no meds      GERD and Dyphagia - Vera esophagus not seen on last scope.  Still on Pantoprazole. Felt to be spasm but amlodipine did not help after awhile.  OK recently . No melena or hematochezia.. Pills sometimes a problem       Carotid Bruit per Dr Espinoza. Doppler in July 2022. MIld on left      Cervical spondylosis - has been good after injection per Dr Gloria several years ago.      COPD, severe and emphysema - Trelegy seems to work. Had daytime oxygen levels good but on April 26, 2018 had nocturnal down to 87 for 8 minutes. So on hs oxygen at hs. He checks at home and stays in 90s. Dr Edmond's office a few weeks ago.      Coronary artery disease - No Chest pain except as above with esophagus, palpitations, numbness, weakness, claudications, or double vision/ loss of vision. He is active with stairs. Dr Peralta.      Elevated PSA up again to 5.4 from 4.38 from 3.73. No blood. Percent free 13% .. Risk is 41%. Has not been to urology with this. Will  refer to urology when ready. But a friend found Pomegranate Juice dropped it so he is trying that.      Familial hyperlipidemia - Med works well without side effects. Good this  time.      Glaucoma - on drops and monitoring with Dr Harper at [facility] in October      Osteoporosis and History of compression fracture of spine seems to be pretty decent. Occasional Tylenol but CBD oil seems to do better.. Prolia every 6 months. Bone density in July 2021 so will recheck       History of lung cancer (2000) - no blood. Has a cough from the PND and mucus better with Allegra.  Dr Guzman had stopped his lisinopril to see if that makes a difference but it did not. Had allergy testing. Allergy to cockroaches. Stopped smoking more than 15 years ago. Last CT was in 4/9/18. Dr Edmond. JOSIE      History of pulmonary embolus (PE) - on Xarelto for life. No new dyspnea or pain.  Also for Afib      Hypertension - Med works well without side effects. Looks good off of amlodipine. Will take if BP is over 130.      Prediabetes - No polyphagia, polydipsia, polyuria, or non healing sores. A1C 6.1 in January. FBS this time 100.      Lumbar facet arthropathy Tylenol helps this but not needed recently as CBD worked well but ran out so off of that.     Seborrheic keratosis no irritated skin lesions. But had BCC removed last year      Tremor - finds he is shaking a lot when eating. Bothering eating and writing. No meds. Discussed medication options and tried Primidone helped only a little and made him dizzy.     Hypothyroidism on Levothyroxine 50. TSH is over 100 but FT4 is 1.10. So will check the T3.    Addendum 1/30/24 - T3 came back normal as well so this is suggestive of TSH production independent of the thyroid level.  So will look at thyroid tumor in setting of failed thyroid gland .  Scope 5/1/18  AAA screen 6/23/20   LW and DPA yes wife   Pneumovax UTD       Patient Care Team:  Raghav Flores MD as PCP - General  Raghav Flores MD as PCP  "- [employer] PCP     Review of Systems    Objective   Vitals:  /80 (BP Location: Left arm, Patient Position: Sitting)   Pulse 64   Ht 1.81 m (5' 11.25\")   Wt 84.4 kg (186 lb)   SpO2 96%   BMI 25.76 kg/m²       Physical Exam  Vitals reviewed.   Constitutional:       General: He is not in acute distress.     Appearance: Normal appearance.      Comments: Chair to table with no unsteadiness.    HENT:      Head: Normocephalic.      Right Ear: Tympanic membrane normal.      Left Ear: Tympanic membrane normal.      Nose: Nose normal.      Mouth/Throat:      Pharynx: Oropharynx is clear.   Eyes:      Extraocular Movements: Extraocular movements intact.      Conjunctiva/sclera: Conjunctivae normal.      Pupils: Pupils are equal, round, and reactive to light.   Neck:      Vascular: No carotid bruit.   Cardiovascular:      Rate and Rhythm: Normal rate and regular rhythm.      Pulses: Normal pulses.      Heart sounds: Normal heart sounds. No murmur heard.  Pulmonary:      Effort: Pulmonary effort is normal. No respiratory distress.      Breath sounds: Normal breath sounds.   Abdominal:      General: Abdomen is flat. Bowel sounds are normal. There is no distension.      Palpations: Abdomen is soft. There is no mass.      Tenderness: There is no abdominal tenderness.   Musculoskeletal:      Cervical back: Normal range of motion and neck supple. No tenderness.   Lymphadenopathy:      Cervical: No cervical adenopathy.   Skin:     General: Skin is warm and dry.      Findings: No rash.   Neurological:      General: No focal deficit present.      Mental Status: He is alert and oriented to person, place, and time.      Comments: Fine tremor of hands    Psychiatric:         Mood and Affect: Mood normal.         Thought Content: Thought content normal.         Judgment: Judgment normal.         Assessment/Plan   Problem List Items Addressed This Visit       Atherosclerosis of native coronary artery of native " heart with angina pectoris (CMS/HCC)    Atrial fibrillation (CMS/HCC)    Relevant Orders    Comprehensive Metabolic Panel    CBC    T3    Basal cell carcinoma    Centrilobular emphysema (CMS/HCC)    Cervical spondylosis    Chronic rhinitis    COPD, severe (CMS/HCC)    Dysphagia    Elevated PSA    Relevant Orders    Prostate Specific Antigen    Follow Up In Primary Care - Established    Essential tremor    Familial hyperlipidemia    GERD (gastroesophageal reflux disease)    Glaucoma    History of pulmonary embolism    Hypertension    Hypothyroidism    Hypoxemia    Osteoporosis    Relevant Orders    XR DEXA bone density    Prediabetes    Seborrheic keratosis     Other Visit Diagnoses       Routine general medical examination at health care facility    -  Primary        Since he is not inclined to take meds for afib, do not see a need to do Holter at this time. Appears to be in sinus today      Patient was identified as a fall risk. Risk prevention instructions provided.  Some meds make him dizzy so he has to watch when he stands up and turns around

## 2024-01-11 LAB — T3 SERPL-MCNC: 108 NG/DL (ref 60–200)

## 2024-01-15 ENCOUNTER — TELEPHONE (OUTPATIENT)
Dept: PRIMARY CARE | Facility: CLINIC | Age: 79
End: 2024-01-15
Payer: MEDICARE

## 2024-01-15 ENCOUNTER — ANCILLARY PROCEDURE (OUTPATIENT)
Dept: RADIOLOGY | Facility: CLINIC | Age: 79
End: 2024-01-15
Payer: MEDICARE

## 2024-01-15 DIAGNOSIS — M81.0 OSTEOPOROSIS WITHOUT CURRENT PATHOLOGICAL FRACTURE, UNSPECIFIED OSTEOPOROSIS TYPE: ICD-10-CM

## 2024-01-15 DIAGNOSIS — E03.9 ACQUIRED HYPOTHYROIDISM: ICD-10-CM

## 2024-01-15 PROCEDURE — 77080 DXA BONE DENSITY AXIAL: CPT

## 2024-01-15 PROCEDURE — 77085 DXA BONE DENSITY AXL VRT FX: CPT | Performed by: RADIOLOGY

## 2024-01-15 RX ORDER — LEVOTHYROXINE SODIUM 50 UG/1
50 TABLET ORAL DAILY
Qty: 90 TABLET | Refills: 3 | Status: SHIPPED | OUTPATIENT
Start: 2024-01-15 | End: 2025-01-14

## 2024-01-15 NOTE — TELEPHONE ENCOUNTER
Pc to patient and let him know he is to keep on the Levothyroxine, his T3 and T4 are good at that dose.  Dr. Flores is waiting to hear from Endocrinology about recommendations for the high TSH.  Rx proposed

## 2024-01-15 NOTE — TELEPHONE ENCOUNTER
Patient called in and was wondering if he is supposed to be continuing to take his levothyroxine. If he is he would like [facility] [place].    Thank you

## 2024-02-01 ENCOUNTER — TELEPHONE (OUTPATIENT)
Dept: PRIMARY CARE | Facility: CLINIC | Age: 79
End: 2024-02-01
Payer: MEDICARE

## 2024-02-01 DIAGNOSIS — E03.9 ACQUIRED HYPOTHYROIDISM: Primary | ICD-10-CM

## 2024-02-01 NOTE — TELEPHONE ENCOUNTER
Pc to patient and let him know he is to continue taking the thyroid medication and he should do blood work the first week in May to follow up on that.

## 2024-02-01 NOTE — RESULT ENCOUNTER NOTE
LMOM that the TSH is better and takes awhile to return to normal after starting the thyroid medication.  The FT4 and T3 were normal. So I want him to check the TSH in 3 months

## 2024-02-01 NOTE — TELEPHONE ENCOUNTER
Got a call from Dr. Flores about his thyroid. Has an appointment July 10. Asking if he needs to get his blood work done yet and asking if he should continue taking his thyroid medication or not. Asking for a call back.

## 2024-02-05 ENCOUNTER — TELEPHONE (OUTPATIENT)
Dept: PRIMARY CARE | Facility: CLINIC | Age: 79
End: 2024-02-05
Payer: MEDICARE

## 2024-02-05 DIAGNOSIS — K21.9 GASTROESOPHAGEAL REFLUX DISEASE WITHOUT ESOPHAGITIS: ICD-10-CM

## 2024-02-05 RX ORDER — PANTOPRAZOLE SODIUM 40 MG/1
40 TABLET, DELAYED RELEASE ORAL DAILY
Qty: 90 TABLET | Refills: 3 | OUTPATIENT
Start: 2024-02-05

## 2024-02-06 RX ORDER — PANTOPRAZOLE SODIUM 40 MG/1
40 TABLET, DELAYED RELEASE ORAL DAILY
Qty: 90 TABLET | Refills: 3 | Status: SHIPPED | OUTPATIENT
Start: 2024-02-06

## 2024-02-06 NOTE — TELEPHONE ENCOUNTER
Stating the pharmacy told him Dr. Flores denied his request of pantoprazole. Asking for a call back.

## 2024-03-26 ENCOUNTER — TELEPHONE (OUTPATIENT)
Dept: PRIMARY CARE | Facility: CLINIC | Age: 79
End: 2024-03-26
Payer: MEDICARE

## 2024-03-26 NOTE — TELEPHONE ENCOUNTER
----- Message from Raghav Flores MD sent at 3/26/2024  1:44 PM EDT -----  Regarding: FW: High TSH  Kenji has a TSH level ordered for May. Make sure his is not taking Biotin before the next test.     ----- Message -----  From: Erich Gomes MD  Sent: 3/26/2024  12:41 PM EDT  To: Raghav Flores MD  Subject: RE: High TSH                                     Dr Flores,   This patient will likely need to get a endocrine referral. Unfortunately, I think my next opening is for next year.   Usually if it is a pituitary source then the free T4 and T3 will be high as well.  I will make sure patient is not on any biotin supplements that can interfere with the thyroid blood work.    ----- Message -----  From: Raghav Flores MD  Sent: 1/11/2024   5:56 PM EDT  To: Erich Gomes MD  Subject: High TSH                                         Dr Gomes,  This patient has a very high TSH yet normal normal Free T4 and total T3.  He has history of lung cancer with JOSIE, high PSA levels, prediabetes. He has no apparent symptoms. What do you think is the cause.  I am wondering if he could be secreting this from a pituitary adenoma.  If so, given no symptoms, would an evaluation be warranted?  Endocrinology consult?     Thank you for your response,  Raghav Flores MD   [facility]

## 2024-05-02 ENCOUNTER — LAB (OUTPATIENT)
Dept: LAB | Facility: LAB | Age: 79
End: 2024-05-02
Payer: MEDICARE

## 2024-05-02 DIAGNOSIS — E03.9 ACQUIRED HYPOTHYROIDISM: ICD-10-CM

## 2024-05-02 DIAGNOSIS — E03.9 ACQUIRED HYPOTHYROIDISM: Primary | ICD-10-CM

## 2024-05-02 LAB
T4 FREE SERPL-MCNC: 1.55 NG/DL (ref 0.61–1.12)
TSH SERPL-ACNC: 46 MIU/L (ref 0.44–3.98)

## 2024-05-02 PROCEDURE — 84439 ASSAY OF FREE THYROXINE: CPT

## 2024-05-02 PROCEDURE — 36415 COLL VENOUS BLD VENIPUNCTURE: CPT

## 2024-05-02 PROCEDURE — 84480 ASSAY TRIIODOTHYRONINE (T3): CPT

## 2024-05-02 PROCEDURE — 84443 ASSAY THYROID STIM HORMONE: CPT

## 2024-05-03 DIAGNOSIS — E03.9 ACQUIRED HYPOTHYROIDISM: Primary | ICD-10-CM

## 2024-05-04 LAB — T3 SERPL-MCNC: NORMAL NG/DL

## 2024-05-06 DIAGNOSIS — E03.9 ACQUIRED HYPOTHYROIDISM: Primary | ICD-10-CM

## 2024-05-07 ENCOUNTER — APPOINTMENT (OUTPATIENT)
Dept: LAB | Facility: LAB | Age: 79
End: 2024-05-07
Payer: MEDICARE

## 2024-05-07 ENCOUNTER — LAB (OUTPATIENT)
Dept: LAB | Facility: LAB | Age: 79
End: 2024-05-07
Payer: MEDICARE

## 2024-05-07 DIAGNOSIS — E03.9 ACQUIRED HYPOTHYROIDISM: ICD-10-CM

## 2024-05-07 LAB — T3 SERPL-MCNC: 103 NG/DL (ref 60–200)

## 2024-05-07 PROCEDURE — 84480 ASSAY TRIIODOTHYRONINE (T3): CPT

## 2024-05-07 PROCEDURE — 36415 COLL VENOUS BLD VENIPUNCTURE: CPT

## 2024-05-08 DIAGNOSIS — M81.0 AGE RELATED OSTEOPOROSIS, UNSPECIFIED PATHOLOGICAL FRACTURE PRESENCE: ICD-10-CM

## 2024-05-08 NOTE — RESULT ENCOUNTER NOTE
Let him know this thyroid test is normal, so he can just keep on the same dose of medication for thyroid.

## 2024-05-09 RX ORDER — DENOSUMAB 60 MG/ML
60 INJECTION SUBCUTANEOUS ONCE
Qty: 1 ML | Refills: 1 | Status: SHIPPED | OUTPATIENT
Start: 2024-05-09 | End: 2024-05-09

## 2024-05-14 ENCOUNTER — OFFICE VISIT (OUTPATIENT)
Dept: PRIMARY CARE | Facility: CLINIC | Age: 79
End: 2024-05-14
Payer: MEDICARE

## 2024-05-14 ENCOUNTER — TELEPHONE (OUTPATIENT)
Dept: PRIMARY CARE | Facility: CLINIC | Age: 79
End: 2024-05-14

## 2024-05-14 VITALS
SYSTOLIC BLOOD PRESSURE: 124 MMHG | HEART RATE: 73 BPM | OXYGEN SATURATION: 97 % | BODY MASS INDEX: 26.45 KG/M2 | WEIGHT: 191 LBS | DIASTOLIC BLOOD PRESSURE: 78 MMHG

## 2024-05-14 DIAGNOSIS — L98.9 SKIN LESIONS: Primary | ICD-10-CM

## 2024-05-14 DIAGNOSIS — M81.0 AGE RELATED OSTEOPOROSIS, UNSPECIFIED PATHOLOGICAL FRACTURE PRESENCE: ICD-10-CM

## 2024-05-14 PROCEDURE — 1036F TOBACCO NON-USER: CPT | Performed by: FAMILY MEDICINE

## 2024-05-14 PROCEDURE — 1159F MED LIST DOCD IN RCRD: CPT | Performed by: FAMILY MEDICINE

## 2024-05-14 PROCEDURE — 3074F SYST BP LT 130 MM HG: CPT | Performed by: FAMILY MEDICINE

## 2024-05-14 PROCEDURE — 99213 OFFICE O/P EST LOW 20 MIN: CPT | Performed by: FAMILY MEDICINE

## 2024-05-14 PROCEDURE — 3078F DIAST BP <80 MM HG: CPT | Performed by: FAMILY MEDICINE

## 2024-05-14 PROCEDURE — 1160F RVW MEDS BY RX/DR IN RCRD: CPT | Performed by: FAMILY MEDICINE

## 2024-05-14 NOTE — PROGRESS NOTES
Subjective   Patient ID: Kenji Tavera is a 78 y.o. male who presents for Follow-up (Check spots on leg).    HPI   Has multiple spots on the legs that keep coming up  Usually were small   I took some off of his back   The big one has been there for several years on the back of left calf.   Had a scab and was careful not to rub it off  Now off and it is weepy and will not scab over  The ones on lower ankle was there for a few months and scaly but not weepy  Has one on the right shin  Those are tender   Also has scaly lesions on the back  Was in the sun a lot for 5 years     Also reviewed the thyroid which had high TSH and FT4 but normal T3 so will keep at same dose of the medication. The TSH is better at 46 instead of 146.     Review of Systems    Objective   /78 (BP Location: Right arm, Patient Position: Sitting)   Pulse 73   Wt 86.6 kg (191 lb)   SpO2 97%   BMI 26.45 kg/m²     Physical Exam  Constitutional:       Appearance: Normal appearance.   Skin:     General: Skin is warm and dry.      Findings: Lesion (Hfas multiple lesions as noted above on the back and legs ranging in size from 5 mm to 15 mm. All are scally and pink except the one on the back of the calf is raised and weepy) present.   Neurological:      Mental Status: He is alert.         Assessment/Plan   Diagnoses and all orders for this visit:  Skin lesions  -     Referral to Dermatology  Age related osteoporosis, unspecified pathological fracture presence

## 2024-05-16 ENCOUNTER — CLINICAL SUPPORT (OUTPATIENT)
Dept: PRIMARY CARE | Facility: CLINIC | Age: 79
End: 2024-05-16
Payer: MEDICARE

## 2024-05-16 DIAGNOSIS — M81.0 AGE RELATED OSTEOPOROSIS, UNSPECIFIED PATHOLOGICAL FRACTURE PRESENCE: ICD-10-CM

## 2024-05-16 PROCEDURE — 96372 THER/PROPH/DIAG INJ SC/IM: CPT | Performed by: FAMILY MEDICINE

## 2024-05-16 NOTE — PROGRESS NOTES
Prolia 60mg (1ml) given subcu right upper outer arm  Patient tolerated well and no adverse reaction.  Next injection due approximately 11/16/2024

## 2024-05-30 PROBLEM — L81.4 LENTIGINES: Status: ACTIVE | Noted: 2024-05-30

## 2024-05-30 PROBLEM — L57.8 ACTINIC SKIN DAMAGE: Status: ACTIVE | Noted: 2024-05-30

## 2024-06-25 ENCOUNTER — OFFICE VISIT (OUTPATIENT)
Dept: PRIMARY CARE | Facility: CLINIC | Age: 79
End: 2024-06-25
Payer: MEDICARE

## 2024-06-25 ENCOUNTER — LAB (OUTPATIENT)
Dept: LAB | Facility: LAB | Age: 79
End: 2024-06-25
Payer: MEDICARE

## 2024-06-25 VITALS
SYSTOLIC BLOOD PRESSURE: 122 MMHG | HEART RATE: 72 BPM | WEIGHT: 192.24 LBS | DIASTOLIC BLOOD PRESSURE: 68 MMHG | BODY MASS INDEX: 26.62 KG/M2 | RESPIRATION RATE: 16 BRPM

## 2024-06-25 DIAGNOSIS — R53.83 OTHER FATIGUE: Primary | ICD-10-CM

## 2024-06-25 DIAGNOSIS — J02.9 PHARYNGITIS, UNSPECIFIED ETIOLOGY: ICD-10-CM

## 2024-06-25 DIAGNOSIS — R53.83 OTHER FATIGUE: ICD-10-CM

## 2024-06-25 LAB
BASOPHILS # BLD AUTO: 0.02 X10*3/UL (ref 0–0.1)
BASOPHILS NFR BLD AUTO: 0.2 %
EOSINOPHIL # BLD AUTO: 0.09 X10*3/UL (ref 0–0.4)
EOSINOPHIL NFR BLD AUTO: 1.1 %
ERYTHROCYTE [DISTWIDTH] IN BLOOD BY AUTOMATED COUNT: 13.2 % (ref 11.5–14.5)
HCT VFR BLD AUTO: 42.7 % (ref 41–52)
HETEROPH AB SERPLBLD QL IA.RAPID: NEGATIVE
HGB BLD-MCNC: 13.7 G/DL (ref 13.5–17.5)
IMM GRANULOCYTES # BLD AUTO: 0.02 X10*3/UL (ref 0–0.5)
IMM GRANULOCYTES NFR BLD AUTO: 0.2 % (ref 0–0.9)
LYMPHOCYTES # BLD AUTO: 0.75 X10*3/UL (ref 0.8–3)
LYMPHOCYTES NFR BLD AUTO: 9.2 %
MCH RBC QN AUTO: 29.2 PG (ref 26–34)
MCHC RBC AUTO-ENTMCNC: 32.1 G/DL (ref 32–36)
MCV RBC AUTO: 91 FL (ref 80–100)
MONOCYTES # BLD AUTO: 1.19 X10*3/UL (ref 0.05–0.8)
MONOCYTES NFR BLD AUTO: 14.5 %
NEUTROPHILS # BLD AUTO: 6.12 X10*3/UL (ref 1.6–5.5)
NEUTROPHILS NFR BLD AUTO: 74.8 %
NRBC BLD-RTO: 0 /100 WBCS (ref 0–0)
PLATELET # BLD AUTO: 228 X10*3/UL (ref 150–450)
RBC # BLD AUTO: 4.69 X10*6/UL (ref 4.5–5.9)
WBC # BLD AUTO: 8.2 X10*3/UL (ref 4.4–11.3)

## 2024-06-25 PROCEDURE — 3078F DIAST BP <80 MM HG: CPT | Performed by: NURSE PRACTITIONER

## 2024-06-25 PROCEDURE — 99213 OFFICE O/P EST LOW 20 MIN: CPT | Performed by: NURSE PRACTITIONER

## 2024-06-25 PROCEDURE — 36415 COLL VENOUS BLD VENIPUNCTURE: CPT

## 2024-06-25 PROCEDURE — 1036F TOBACCO NON-USER: CPT | Performed by: NURSE PRACTITIONER

## 2024-06-25 PROCEDURE — 1159F MED LIST DOCD IN RCRD: CPT | Performed by: NURSE PRACTITIONER

## 2024-06-25 PROCEDURE — 1160F RVW MEDS BY RX/DR IN RCRD: CPT | Performed by: NURSE PRACTITIONER

## 2024-06-25 PROCEDURE — 86308 HETEROPHILE ANTIBODY SCREEN: CPT

## 2024-06-25 PROCEDURE — 3074F SYST BP LT 130 MM HG: CPT | Performed by: NURSE PRACTITIONER

## 2024-06-25 PROCEDURE — 85025 COMPLETE CBC W/AUTO DIFF WBC: CPT

## 2024-06-25 RX ORDER — AZITHROMYCIN 250 MG/1
TABLET, FILM COATED ORAL DAILY
Qty: 6 TABLET | Refills: 0 | Status: SHIPPED | OUTPATIENT
Start: 2024-06-25 | End: 2024-06-30

## 2024-06-25 ASSESSMENT — ENCOUNTER SYMPTOMS
SORE THROAT: 1
SHORTNESS OF BREATH: 1

## 2024-06-25 NOTE — PROGRESS NOTES
"Subjective   Patient ID: Kenji Tavera is a 78 y.o. male who presents for Follow up ER.    Here to follow up ER visit from 6/21/24. Report reviewed. Strep negative  States has has ST for about a week.  Chronic sinus drainage \"always\".  No fever, no cough.  Feeling OK, but tired. Not sleeping well due to ST.  ST seemed to improve with the dose of steroid in the ER, but worsened again after about 1 day.         Review of Systems   HENT:  Positive for sore throat.    Respiratory:  Positive for shortness of breath (\"As always\").        Objective   /68 (BP Location: Left arm)   Pulse 72   Resp 16   Wt 87.2 kg (192 lb 3.9 oz)   BMI 26.62 kg/m²     Physical Exam  Constitutional:       Appearance: Normal appearance.   HENT:      Head: Normocephalic.      Nose: Nose normal.      Mouth/Throat:      Pharynx: Oropharynx is clear. Posterior oropharyngeal erythema present.   Eyes:      Conjunctiva/sclera: Conjunctivae normal.   Cardiovascular:      Rate and Rhythm: Normal rate and regular rhythm.      Heart sounds: Normal heart sounds.   Pulmonary:      Effort: Pulmonary effort is normal.      Breath sounds: Normal breath sounds.   Abdominal:      General: Bowel sounds are normal.      Palpations: Abdomen is soft.   Musculoskeletal:      Cervical back: Neck supple. Tenderness (Tender lower neck bilaterally near larynx above collarbone) present.   Skin:     General: Skin is warm and dry.   Neurological:      Mental Status: He is alert.   Psychiatric:         Mood and Affect: Mood normal.         Assessment/Plan   Diagnoses and all orders for this visit:  Other fatigue (Primary)  -     CBC and Auto Differential; Future  -     Mononucleosis screen; Future  Pharyngitis, unspecified etiology  -     CBC and Auto Differential; Future  -     Mononucleosis screen; Future  -     azithromycin (Zithromax) 250 mg tablet; Take 2 tablets (500 mg) by mouth once daily for 1 day, THEN 1 tablet (250 mg) once daily for 4 days. Take 2 tabs " (500 mg) by mouth today, than 1 daily for 4 days..        ER followup Pharyngitis, not strep  Suspect viral etiology, but not resolving. Will check for Mono  Rx sent for Zpack if not resolving and Mono Negative, although given the option to watch for 2-3 days for resolution before antibiotic.  Follow up symptoms not improving and resolving completely or worsens/changes/concerns.     6/28/24 - Pt. Notified mono spot negative. States not really feeling better. Will start the Z pack and let me know how he is feeling by next Tuesday. Sooner if worsens/changes/concerns

## 2024-07-02 ENCOUNTER — TELEPHONE (OUTPATIENT)
Dept: PRIMARY CARE | Facility: CLINIC | Age: 79
End: 2024-07-02
Payer: MEDICARE

## 2024-07-02 NOTE — TELEPHONE ENCOUNTER
PC to patient and let him know that those findings are the type that have been there for awhile.  So should not be a problem to wait till 7/10. Also per dr. Flores, this is not causing the pain he had in the neck.   Patient stated it is starting to get better.

## 2024-07-02 NOTE — TELEPHONE ENCOUNTER
States patient was in ER over the weekend, did a CT scan states his Carotid artery is blocked in the R side of his neck.  Does he need to be seen sooner than his appointment next week? Please call to advise

## 2024-07-09 ENCOUNTER — LAB (OUTPATIENT)
Dept: LAB | Facility: LAB | Age: 79
End: 2024-07-09
Payer: MEDICARE

## 2024-07-09 DIAGNOSIS — I48.0 PAROXYSMAL ATRIAL FIBRILLATION (MULTI): ICD-10-CM

## 2024-07-09 DIAGNOSIS — R97.20 ELEVATED PSA: ICD-10-CM

## 2024-07-09 LAB
ALBUMIN SERPL BCP-MCNC: 3.9 G/DL (ref 3.4–5)
ALP SERPL-CCNC: 76 U/L (ref 33–136)
ALT SERPL W P-5'-P-CCNC: 14 U/L (ref 10–52)
ANION GAP SERPL CALC-SCNC: 11 MMOL/L (ref 10–20)
AST SERPL W P-5'-P-CCNC: 12 U/L (ref 9–39)
BILIRUB SERPL-MCNC: 0.4 MG/DL (ref 0–1.2)
BUN SERPL-MCNC: 23 MG/DL (ref 6–23)
CALCIUM SERPL-MCNC: 9.1 MG/DL (ref 8.6–10.3)
CHLORIDE SERPL-SCNC: 105 MMOL/L (ref 98–107)
CO2 SERPL-SCNC: 29 MMOL/L (ref 21–32)
CREAT SERPL-MCNC: 1.03 MG/DL (ref 0.5–1.3)
EGFRCR SERPLBLD CKD-EPI 2021: 74 ML/MIN/1.73M*2
ERYTHROCYTE [DISTWIDTH] IN BLOOD BY AUTOMATED COUNT: 13.1 % (ref 11.5–14.5)
GLUCOSE SERPL-MCNC: 106 MG/DL (ref 74–99)
HCT VFR BLD AUTO: 46 % (ref 41–52)
HGB BLD-MCNC: 14.4 G/DL (ref 13.5–17.5)
MCH RBC QN AUTO: 28.7 PG (ref 26–34)
MCHC RBC AUTO-ENTMCNC: 31.3 G/DL (ref 32–36)
MCV RBC AUTO: 92 FL (ref 80–100)
NRBC BLD-RTO: 0 /100 WBCS (ref 0–0)
PLATELET # BLD AUTO: 279 X10*3/UL (ref 150–450)
POTASSIUM SERPL-SCNC: 4.2 MMOL/L (ref 3.5–5.3)
PROT SERPL-MCNC: 6.7 G/DL (ref 6.4–8.2)
PSA SERPL-MCNC: 7.16 NG/ML
RBC # BLD AUTO: 5.02 X10*6/UL (ref 4.5–5.9)
SODIUM SERPL-SCNC: 141 MMOL/L (ref 136–145)
WBC # BLD AUTO: 6.8 X10*3/UL (ref 4.4–11.3)

## 2024-07-09 PROCEDURE — 85027 COMPLETE CBC AUTOMATED: CPT

## 2024-07-09 PROCEDURE — 80053 COMPREHEN METABOLIC PANEL: CPT

## 2024-07-09 PROCEDURE — 36415 COLL VENOUS BLD VENIPUNCTURE: CPT

## 2024-07-09 PROCEDURE — 84153 ASSAY OF PSA TOTAL: CPT

## 2024-07-10 ENCOUNTER — APPOINTMENT (OUTPATIENT)
Dept: PRIMARY CARE | Facility: CLINIC | Age: 79
End: 2024-07-10
Payer: MEDICARE

## 2024-07-10 VITALS
SYSTOLIC BLOOD PRESSURE: 120 MMHG | DIASTOLIC BLOOD PRESSURE: 78 MMHG | BODY MASS INDEX: 26.18 KG/M2 | OXYGEN SATURATION: 97 % | HEART RATE: 76 BPM | WEIGHT: 189 LBS

## 2024-07-10 DIAGNOSIS — I10 PRIMARY HYPERTENSION: ICD-10-CM

## 2024-07-10 DIAGNOSIS — R13.14 PHARYNGOESOPHAGEAL DYSPHAGIA: ICD-10-CM

## 2024-07-10 DIAGNOSIS — M47.812 CERVICAL SPONDYLOSIS: ICD-10-CM

## 2024-07-10 DIAGNOSIS — E78.49 FAMILIAL HYPERLIPIDEMIA: ICD-10-CM

## 2024-07-10 DIAGNOSIS — I25.10 ATHEROSCLEROSIS OF NATIVE CORONARY ARTERY OF NATIVE HEART WITHOUT ANGINA PECTORIS: ICD-10-CM

## 2024-07-10 DIAGNOSIS — M76.821 POSTERIOR TIBIAL TENDINITIS OF RIGHT LOWER EXTREMITY: ICD-10-CM

## 2024-07-10 DIAGNOSIS — L57.8 ACTINIC SKIN DAMAGE: Primary | ICD-10-CM

## 2024-07-10 DIAGNOSIS — M47.816 LUMBAR FACET ARTHROPATHY: ICD-10-CM

## 2024-07-10 DIAGNOSIS — C44.711 BASAL CELL CARCINOMA (BCC) OF SKIN OF LOWER EXTREMITY INCLUDING HIP, UNSPECIFIED LATERALITY: ICD-10-CM

## 2024-07-10 DIAGNOSIS — K21.9 GASTROESOPHAGEAL REFLUX DISEASE WITHOUT ESOPHAGITIS: ICD-10-CM

## 2024-07-10 DIAGNOSIS — I65.21 STENOSIS OF RIGHT CAROTID ARTERY: ICD-10-CM

## 2024-07-10 DIAGNOSIS — R09.02 HYPOXEMIA: ICD-10-CM

## 2024-07-10 DIAGNOSIS — M81.6 LOCALIZED OSTEOPOROSIS WITHOUT CURRENT PATHOLOGICAL FRACTURE: ICD-10-CM

## 2024-07-10 DIAGNOSIS — J44.9 COPD, SEVERE (MULTI): ICD-10-CM

## 2024-07-10 DIAGNOSIS — G25.0 ESSENTIAL TREMOR: ICD-10-CM

## 2024-07-10 DIAGNOSIS — Z86.711 HISTORY OF PULMONARY EMBOLISM: ICD-10-CM

## 2024-07-10 DIAGNOSIS — R97.20 ELEVATED PSA: ICD-10-CM

## 2024-07-10 DIAGNOSIS — R73.03 PREDIABETES: ICD-10-CM

## 2024-07-10 DIAGNOSIS — J31.0 CHRONIC RHINITIS: ICD-10-CM

## 2024-07-10 DIAGNOSIS — I48.0 PAROXYSMAL ATRIAL FIBRILLATION (MULTI): ICD-10-CM

## 2024-07-10 DIAGNOSIS — H40.9 GLAUCOMA OF BOTH EYES, UNSPECIFIED GLAUCOMA TYPE: ICD-10-CM

## 2024-07-10 DIAGNOSIS — J43.2 CENTRILOBULAR EMPHYSEMA (MULTI): ICD-10-CM

## 2024-07-10 DIAGNOSIS — E03.9 ACQUIRED HYPOTHYROIDISM: ICD-10-CM

## 2024-07-10 DIAGNOSIS — L81.4 LENTIGINES: ICD-10-CM

## 2024-07-10 PROBLEM — G47.10 DAYTIME HYPERSOMNIA: Status: RESOLVED | Noted: 2023-10-25 | Resolved: 2024-07-10

## 2024-07-10 PROBLEM — R09.89 CAROTID BRUIT: Status: RESOLVED | Noted: 2023-06-19 | Resolved: 2024-07-10

## 2024-07-10 PROCEDURE — 1160F RVW MEDS BY RX/DR IN RCRD: CPT | Performed by: FAMILY MEDICINE

## 2024-07-10 PROCEDURE — 3074F SYST BP LT 130 MM HG: CPT | Performed by: FAMILY MEDICINE

## 2024-07-10 PROCEDURE — 1123F ACP DISCUSS/DSCN MKR DOCD: CPT | Performed by: FAMILY MEDICINE

## 2024-07-10 PROCEDURE — 1159F MED LIST DOCD IN RCRD: CPT | Performed by: FAMILY MEDICINE

## 2024-07-10 PROCEDURE — 99215 OFFICE O/P EST HI 40 MIN: CPT | Performed by: FAMILY MEDICINE

## 2024-07-10 PROCEDURE — 1036F TOBACCO NON-USER: CPT | Performed by: FAMILY MEDICINE

## 2024-07-10 PROCEDURE — 3078F DIAST BP <80 MM HG: CPT | Performed by: FAMILY MEDICINE

## 2024-07-10 PROCEDURE — 1158F ADVNC CARE PLAN TLK DOCD: CPT | Performed by: FAMILY MEDICINE

## 2024-07-10 RX ORDER — AMLODIPINE BESYLATE 5 MG/1
5 TABLET ORAL DAILY PRN
Qty: 90 TABLET | Refills: 3 | Status: SHIPPED | OUTPATIENT
Start: 2024-07-10 | End: 2025-07-10

## 2024-07-10 RX ORDER — AMLODIPINE BESYLATE 5 MG/1
5 TABLET ORAL DAILY
Qty: 90 TABLET | Refills: 3 | Status: SHIPPED | OUTPATIENT
Start: 2024-07-10 | End: 2024-07-10 | Stop reason: DRUGHIGH

## 2024-07-10 ASSESSMENT — PATIENT HEALTH QUESTIONNAIRE - PHQ9
SUM OF ALL RESPONSES TO PHQ9 QUESTIONS 1 AND 2: 0
2. FEELING DOWN, DEPRESSED OR HOPELESS: NOT AT ALL
1. LITTLE INTEREST OR PLEASURE IN DOING THINGS: NOT AT ALL

## 2024-07-10 NOTE — PROGRESS NOTES
Subjective   Patient ID: Kenji Tavera is a 78 y.o. male who presents for Med Management (ER follow up for neck pain, pharyngitis, look at spots on leg).    HPI   BCC and SK's- [facility] took off 4 lesions on legs. 3 were BCC. In past on the back. It has been 2 weeks.  Some are red today. Tender.      Atrial fibrillation - no chest pain or racing even when in afib.  Was on Amiodarone after cardioversioin.  Did not tolerate that well so off of it now.  No edema or chest congestion. No longer needs Lasix 40.   ECHO reported OK. Dr Espinoza did a normal stress test before surgery in May 2022. Dr. Peralta now.     Cervical spondylosis - in ER on July 1 with a lot of pain at the base of the skull. Was found to have degenerative changes on CT. Felt like a stiff neck. Given Morphine IV. Now doing OK.  Muscle relaxer made him dizzy.  And now not needed. Did not need Norco. Injections a few years ago. Dr Gloria.      Chronic Rhinitis - Was put on a nasal spray also for nasal congestion and got lightheaded.  Astepro as needed.  Was in ER in June with a bad ST. Negative for Strep and Mono. Getting better but still hoarse. A lot of drainage. Pills are sticking. Will get on Astepro daily.       GERD and Dyphagia - Vera esophagus not seen on last scope.  Still on Pantoprazole. Felt to be spasm but amlodipine did not help.  OK recently . No melena or hematochezia.. Pills sometimes a problem       Carotid Bruit per Dr Espinoza. Doppler in July 2022. MIld on left but when in ER he had CTA showing severe right carotid and vertebral stenosis.  Will see Dr Peralta for this. Copy of CTA report given to him.     COPD, severe and emphysema - Trelegy seems to work. Had daytime oxygen levels good but on April 26, 2018 had nocturnal down to 87 for 8 minutes. So on hs oxygen at hs off an on. He checks at home and stays in 90s. Dr Edmond's office a few months ago. Will need to see a new doctor there.      Coronary artery disease - No  Chest pain except as above with esophagus, palpitations, numbness, weakness, claudications, or double vision/ loss of vision. He is active with stairs. Dr Peralta.      Elevated PSA up again to  7.1 from 5.4 from 4.38 from 3.73. No blood. Percent free 13%.   Risk is 41%. Has not been to urology with this. Will refer to urology when ready. But a friend found Pomegranate Juice dropped it so he tried that and does not seem to help. So will refer to Dr Moore.       Familial hyperlipidemia - Med works well without side effects. Good last time.      Glaucoma - on drops and monitoring with Dr Harper at [facility].. Had laser on both in the last month.       Osteoporosis and History of compression fracture of spine seems to be pretty decent. Occasional Tylenol but CBD oil seems to do better. Prolia every 6 months. Bone density in January was better.        History of lung cancer (2000) - no blood. Has a cough and hoarseness from the PND.   Dr Guzman had stopped his lisinopril to see if that makes a difference but it did not. Had allergy testing. Allergy to cockroaches. Stopped smoking more than 15 years ago. Last CT was in 4/9/18. Dr Edmond. JOSIE      History of pulmonary embolus (PE) - on Xarelto for life. No new dyspnea or pain.  Also for Afib      Hypertension - Med works well without side effects. Would like to get off of  amlodipine. Will take if BP is over 130.      Prediabetes - No polyphagia, polydipsia, polyuria.. Some non healing sores. A1C 6.1 in January. FBS this time 106.      Lumbar facet arthropathy Tylenol helps this but not needed recently as CBD worked well but ran out so off of that     Tremor - finds he is shaking a lot when eating. Bothering eating and writing. No meds. Discussed medication options and tried Primidone helped only a little and made him dizzy.      Hypothyroidism on Levothyroxine 50. TSH is over 100 but FT4 is 1.10.  T3 came back normal as well so this is suggestive of TSH production  independent of the thyroid level.     Scope 5/1/18  AAA screen 6/23/20   LW and DPA yes wife   Pneumovax UTD     Review of Systems    Objective   /78 (BP Location: Left arm, Patient Position: Sitting)   Pulse 76   Wt 85.7 kg (189 lb)   SpO2 97%   BMI 26.18 kg/m²     Physical Exam  Vitals reviewed.   Constitutional:       General: He is not in acute distress.     Appearance: Normal appearance.   HENT:      Head: Normocephalic.      Right Ear: Tympanic membrane, ear canal and external ear normal.      Left Ear: Tympanic membrane, ear canal and external ear normal.      Nose: Nose normal.      Mouth/Throat:      Mouth: Mucous membranes are moist.      Pharynx: Oropharynx is clear.      Comments: Gravely voice   Eyes:      Extraocular Movements: Extraocular movements intact.      Conjunctiva/sclera: Conjunctivae normal.      Pupils: Pupils are equal, round, and reactive to light.   Neck:      Vascular: No carotid bruit.   Cardiovascular:      Rate and Rhythm: Normal rate and regular rhythm.      Pulses: Normal pulses.      Heart sounds: Normal heart sounds. No murmur heard.  Pulmonary:      Effort: Pulmonary effort is normal. No respiratory distress.      Breath sounds: Normal breath sounds.   Abdominal:      General: Abdomen is flat. Bowel sounds are normal. There is no distension.      Palpations: Abdomen is soft. There is no mass.      Tenderness: There is no abdominal tenderness.   Musculoskeletal:      Cervical back: Normal range of motion and neck supple. No tenderness.   Lymphadenopathy:      Cervical: No cervical adenopathy.   Skin:     General: Skin is warm and dry.      Findings: Lesion (2 eschars on left leg and one on right from excisions. A little pink around them but seem to be healing well.) present. No rash.   Neurological:      General: No focal deficit present.      Mental Status: He is alert and oriented to person, place, and time.   Psychiatric:         Mood and Affect: Mood normal.          Thought Content: Thought content normal.         Judgment: Judgment normal.       Assessment/Plan   Diagnoses and all orders for this visit:  Actinic skin damage  Elevated PSA  -     Follow Up In Primary Care - Established  -     Referral to Urology; Future  Paroxysmal atrial fibrillation (Multi)  -     CBC; Future  -     Comprehensive Metabolic Panel; Future  -     Follow Up In Primary Care - Established; Future  Atherosclerosis of native coronary artery of native heart without angina pectoris  Basal cell carcinoma (BCC) of skin of lower extremity including hip, unspecified laterality  Stenosis of right carotid artery  Centrilobular emphysema (Multi)  Cervical spondylosis  Chronic rhinitis  COPD, severe (Multi)  Pharyngoesophageal dysphagia  Essential tremor  Familial hyperlipidemia  -     Lipid Panel; Future  Gastroesophageal reflux disease without esophagitis  Glaucoma of both eyes, unspecified glaucoma type  History of pulmonary embolism  Primary hypertension  Acquired hypothyroidism  -     TSH with reflex to Free T4 if abnormal; Future  -     T3; Future  Hypoxemia  Lentigines  Lumbar facet arthropathy  Localized osteoporosis without current pathological fracture  Prediabetes  -     Hemoglobin A1C; Future  Posterior tibial tendinitis of right lower extremity  Other orders  -     amLODIPine (Norvasc) 5 mg tablet; Take 1 tablet (5 mg) by mouth once daily as needed (high BP).

## 2024-08-26 ENCOUNTER — HOSPITAL ENCOUNTER (OUTPATIENT)
Age: 79
Discharge: HOME | End: 2024-08-26
Payer: MEDICARE

## 2024-08-26 DIAGNOSIS — R97.20: ICD-10-CM

## 2024-08-26 DIAGNOSIS — C61: Primary | ICD-10-CM

## 2024-08-26 DIAGNOSIS — N42.31: ICD-10-CM

## 2024-08-26 PROCEDURE — 88305 TISSUE EXAM BY PATHOLOGIST: CPT

## 2024-08-26 PROCEDURE — 88342 IMHCHEM/IMCYTCHM 1ST ANTB: CPT

## 2024-08-26 PROCEDURE — G0416 PROSTATE BIOPSY, ANY MTHD: HCPCS

## 2024-08-26 PROCEDURE — 88341 IMHCHEM/IMCYTCHM EA ADD ANTB: CPT

## 2024-08-26 NOTE — PROSBIL_PTH
PATHOLOGY RESULTS

PATIENT: MIHAELA HEATON           [facility] #:[id number]  [facility]    U#:[id number]
                                       AGE/SX: 78/M         ROOM:           RE2024
REG DR: Dr. Praveen Ray MD     : 1945      BED:            DIS: 2024

--------------------------------------------------------------------------------------------

SPEC #: [id number]        RECD: 24 10:39    STATUS:  GRETA        RESEBASTIAN #: [id number]
                        NADEEM: 24 13:00    SUBM DR: Praveen Ray

DEPT: SURGICAL PATHOLOGY                        RECD BY: Rita Fried

ENTERED: 24 10:39 SP TYPE: PROST BX   EL DR: Dr. Ryan Blank MD

Tissues:    PROSTATE RIGHT
            PROSTATE RIGHT
            PROSTATE RIGHT
            PROSTATE LEFT
Procedures: PROSTATE BX

--------------------------------------------------------------------------------------------

HEADER

OPERATION: Prostate biopsy  
PRE-OP DIAGNOSIS: Elevated PSA  
TISSUE SUBMITTED: A - Right apex, B - Right mid, C - Right base, D - Left apex, E - Left 
mid,   
F - Left base  

--------------------------------------------------------------------------------------------

MICROSCOPIC DIAGNOSIS

A.  Right prostate, apex, core biopsy:  
    Prostatic adenocarcinoma.  
       Karina grade:  3+4=7  
       Number of cores involved: 1/1   
       Proportion of tissue involved:  80%  
       Perineural invasion:  Not identified.  
       Greatest tumor length:  0.9cm  
B.  Right prostate, mid, core biopsy:  
    Prostatic adenocarcinoma.  
       [place] grade:  3+4=7  
       Number of cores involved:  1/1  
       Proportion of tissue involved:  60%  
       Perineural invasion:  Not identified.  
       Greatest tumor length:  0.7cm  
C.  Right prostate, base, core biopsy:  
    Prostatic tissue, negative for malignancy.  
D.  Left prostate, apex, core biopsy:  
    Focal high-grade prostatic intraepithelial neoplasia (HGPIN).  
E.  Left prostate, mid, core biopsy:  
    Prostatic adenocarcinoma.  
       Karina grade:  3+4=7  
       Number of cores involved: 1/1   
       Proportion of tissue involved:  ~15-20%  
       Perineural invasion:  Not identified.  
       Greatest tumor length:  0.3cm  
    See comment.  
F.  Left prostate, base, core biopsy:  
    Focal high-grade prostatic intraepithelial neoplasia (HGPIN).  
  
CORDELIA. 2024  

--------------------------------------------------------------------------------------------

COMMENT

B.  The specimen was fragmented during processing.  
E. Immunohistochemistry ([id number]) supports the above diagnosis.  
  
Case has been reviewed in consultation with Dr. Camarillo who concurs with the above diagnosis.  
IDC:AM  

--------------------------------------------------------------------------------------------

MICROSCOPIC DESCRIPTION

Slides are reviewed.  

--------------------------------------------------------------------------------------------

GROSS DESCRIPTION

A - Received is one container designated  prostate, right apex.   The specimen consists of 
one elongated fragments of light tan-white soft tissue measuring 1.5 cm in length and 0.1 cm 
in diameter.  The specimen is totally submitted in one cassette.  
   
B - Received is one container designated  prostate, right mid.   The specimen consists of 
one elongated fragments of light tan-white soft tissue measuring 1.3 cm in length and 0.1 cm 
in diameter.  The specimen is totally submitted in one cassette.  
   
C - Received is one container designated  prostate, right base.   The specimen consists of 
one elongated fragments of light tan-white soft tissue measuring 1.2 cm in length and 0.1 cm 
in diameter.  The specimen is totally submitted in one cassette.  
  
D - Received is one container designated  prostate, left apex.   The specimen consists of 
one elongated fragments of light tan-white soft tissue measuring 1.4 cm in length and 0.1 cm 
in diameter.  The specimen is totally submitted in one cassette.  
   
E - Received is one container designated  prostate, left mid.   The specimen consists of one 
elongated fragments of light tan-white soft tissue measuring 1.2 cm in length and 0.1 cm in 
diameter.  The specimen is totally submitted in one cassette.  
  
F - Received is one container designated  prostate, left base.   The specimen consists of 
one elongated fragments of light tan-white soft tissue measuring 0.7 cm in length and 0.1 cm 
in diameter.  The specimen is totally submitted in one cassette. / SJ.mr 2024 TC:0  
CPT:

## 2024-08-26 NOTE — IMM_PTH
PATHOLOGY RESULTS

PATIENT: MIHAELA HEATON           ACCT #:[id number]  LOC: [facility]    U#:[id number]
                                       AGE/SX: 78/M         ROOM:           RE2024
REG DR: Dr. Praveen Ray MD     : 1945      BED:            DIS: 2024

--------------------------------------------------------------------------------------------

SPEC #: [id number]        RECD: 24 11:49    STATUS:  GRETA        REQ #: [id number]
                        NADEEM: 24 00:00    SUBM DR: Praveen Ray

DEPT: IMMUNOHISTOCHEMISTRY                      RECD BY: Maurisio Chan

ENTERED: 24 11:49 SP TYPE: IMMUNO     OTHR DR: Dr. Ryan Blank MD

Tissues:    PROSTATE LEFT
Procedures: 34BE12 (add)
            P40 (initial)

--------------------------------------------------------------------------------------------

PHYSICIAN & INSTITUTION

[place] [id number] 

--------------------------------------------------------------------------------------------

SPECIMEN INFORMATION:

Tissue Source: E- Left mid biopsy  
Clinical Info: Elevated PSA  
Specimen Number: [id number]  
CPT code: 04682,[id number]  
  
METHODOLOGY:  
Deparaffinized sections of prefer/formalin-fixed tissue or PAP/DQ stained slides are 
incubated with monoclonal/polyclonal antibodies/oligonucleotide probes.  Localization is 
made via biotin free  immunoperoxidase method.  Appropriate controls are performed and 
reacted as expected. Results on target cell population are indicated in the following table:  

--------------------------------------------------------------------------------------------

RESULTS:

ANTIBODY / CLONE                      RESULT  
Block E  
P40  (BC28)            negative  
34BE12  (34BE12)             negative  
  
These tests were developed and their performance characteristics determined by [facility].  They may not have been cleared or approved by the U.S. Food 
and Drug Administration.  The FDA has determined that such clearance or approval is not 
necessary.  
The above immunohistochemical/dualISH markers are ordered and reviewed by the Pathologist.   

--------------------------------------------------------------------------------------------

INTERPRETATION:

E. Prostate, left mid, core biopsy:   
    Adenocarcinoma.  
  
CORDELIA/ 2024

## 2024-08-27 PROBLEM — N40.2 NODULAR PROSTATE WITHOUT LOWER URINARY TRACT SYMPTOMS: Status: ACTIVE | Noted: 2024-08-27

## 2025-01-14 DIAGNOSIS — I10 PRIMARY HYPERTENSION: ICD-10-CM

## 2025-01-14 DIAGNOSIS — E78.49 FAMILIAL HYPERLIPIDEMIA: ICD-10-CM

## 2025-01-14 DIAGNOSIS — K21.9 GASTROESOPHAGEAL REFLUX DISEASE WITHOUT ESOPHAGITIS: ICD-10-CM

## 2025-01-14 DIAGNOSIS — I48.91 ATRIAL FIBRILLATION, UNSPECIFIED TYPE (MULTI): ICD-10-CM

## 2025-01-14 RX ORDER — METOPROLOL SUCCINATE 25 MG/1
25 TABLET, EXTENDED RELEASE ORAL DAILY
Qty: 90 TABLET | Refills: 3 | OUTPATIENT
Start: 2025-01-14

## 2025-01-14 RX ORDER — RIVAROXABAN 20 MG/1
20 TABLET, FILM COATED ORAL DAILY
Qty: 90 TABLET | Refills: 3 | OUTPATIENT
Start: 2025-01-14

## 2025-01-14 RX ORDER — PANTOPRAZOLE SODIUM 40 MG/1
40 TABLET, DELAYED RELEASE ORAL DAILY
Qty: 90 TABLET | Refills: 3 | OUTPATIENT
Start: 2025-01-14

## 2025-01-14 RX ORDER — LISINOPRIL AND HYDROCHLOROTHIAZIDE 20; 25 MG/1; MG/1
1 TABLET ORAL DAILY
Qty: 90 TABLET | Refills: 3 | OUTPATIENT
Start: 2025-01-14 | End: 2026-01-14

## 2025-01-14 RX ORDER — PRAVASTATIN SODIUM 40 MG/1
40 TABLET ORAL DAILY
Qty: 90 TABLET | Refills: 3 | OUTPATIENT
Start: 2025-01-14

## 2025-02-03 ENCOUNTER — HOSPITAL ENCOUNTER (OUTPATIENT)
Dept: HOSPITAL 100 - CVS | Age: 80
Discharge: HOME | End: 2025-02-03
Payer: MEDICARE

## 2025-02-03 DIAGNOSIS — I34.0: Primary | ICD-10-CM

## 2025-02-03 PROCEDURE — 93306 TTE W/DOPPLER COMPLETE: CPT

## 2025-02-03 NOTE — ECHOD_ITS
Reason For Study: Murmur  
  
Procedure  
This was a 2D Doppler, Color Flow transthoracic echocardiogram. Exam performed 
in department.  
  
Left Ventricle  
Normal LV size. The left ventricular ejection fraction is 50 %. No regional wall
motion  
abnormalities noted.  
  
Right Ventricle  
Normal RV size. Normal systolic function.  
  
Atria  
Normal left atrium. Normal right atrium.  
  
Mitral Valve  
Bileaflet mitral valve prolapse. Mild-Moderate (1-2+) eccentric mitral valve 
insufficiency.  
  
Tricuspid Valve  
Normal tricuspid valve. Mild (1+) tricuspid valve insufficiency. Pulmonary 
artery systolic pressure  
is 36 mmHg.  
  
Aortic Valve  
Trisinus/trileaflet aortic valve.  
  
Pulmonic Valve  
Normal pulmonic valve.  
  
Great Vessels  
Normal aortic root. The pulmonary artery is normal size. Inferior vena cava 
collapse with  
respiration.  
  
Pericardium/Pleural  
No pericardial effusion.  
  
MMode/2D Measurements & Calculations  
LVIDd: 4.5 cm                            IVSd: 1.1 cm               Ao root 
diam: 3.4 cm  
LVIDs: 3.5 cm                            LVPWd: 0.84 cm  
RVDd: 3.6 cm                             FS: 22.9 %  
          
________________________________________________________________________________  
_  
LAV(MOD-bp): 64.7 ml                     LVAd ap4: 29.3 cm2         SV(MOD-sp4):
47.6 ml  
LAV(MOD-bp) Indexed: 31.5 ml/m2          LVLd ap4: 7.3 cm           SI(MOD-sp4):
23.2 ml/m2  
LAV(MOD-sp2): 67.0 ml                    EDV(MOD-sp4): 93.6 ml  
LAV(MOD-sp4): 57.8 ml                    EDV(sp4-el): 99.8 ml  
  
                                         LVAs ap4: 18.5 cm2  
                                         LVLs ap4: 6.0 cm  
                                         ESV(MOD-sp4): 46.1 ml  
                                         ESV(sp4-el): 49.0 ml  
                                         EF(MOD-sp4): 50.8 %  
                                         EF(sp4-el): 50.9 %  
          
________________________________________________________________________________  
_  
SV(sp4-el): 50.8 ml                      LA A4 area: 20.6 cm2       LA 
dimension(2D): 4.3 cm  
          
________________________________________________________________________________  
_  
RA A4 area: 20.7 cm2  
  
Time Measurements  
MV dec time: 0.14 sec  
  
Doppler Measurements & Calculations  
MV E max angel: 56.1 cm/sec      Lat Peak E' Angel: 6.8 cm/sec         Med Peak E' 
Angel: 6.1 cm/sec  
MV A max angel: 40.8 cm/sec      E/E' lat: 8.3                       E/E' med: 9.2  
MV E/A: 1.4  
          
________________________________________________________________________________  
_  
MV V2 max: 71.9 cm/sec         MV P1/2t max angel: 71.9 cm/sec       Ao V2 max: 
68.6 cm/sec  
MV max P.1 mmHg            MV P1/2t: 55.3 msec                 Ao max P.9 mmHg  
MV V2 mean: 37.1 cm/sec                                            Ao V2 mean: 
44.2 cm/sec  
MV mean P.64 mmHg          MV dec slope: 381.0 cm/sec2         Ao mean P.92 mmHg  
MV V2 VTI: 18.4 cm             MVA(P1/2t): 4.0 cm2                 Ao V2 VTI: 
15.1 cm  
                                                                   AV (velocity 
ratio): 0.91  
  
          
________________________________________________________________________________  
_  
LV V1 max: 66.7 cm/sec         MR max angel: 428.5 cm/sec            TR max angel: 
288.4 cm/sec  
LV V1 max P.8 mmHg         MR max P.4 mmHg                TR max P.3 mmHg  
LV V1 mean P.77 mmHg       MR mean angel: 331.1 cm/sec  
LV V1 mean: 39.8 cm/sec        MR mean P.7 mmHg  
LV V1 VTI: 13.8 cm             MR VTI: 154.8 cm  
  
ORDER #: [id number] ECHO/Echo Complete  
Interpretation Summary  
Normal LV size.  
Bileaflet mitral valve prolapse.  
Mild-Moderate (1-2+) eccentric mitral valve insufficiency.  
The left ventricular ejection fraction is 50 %.  
 
  
 
  
______________________________________________________________________________  
Ordering Physician: Ana Chase  
Referring Physician: Ana Chase  
Performed By: Navneet Perez RCS  
Electronically signed by: Reji Escobedo MD on 2025 10:20 AM

## 2025-02-03 NOTE — XMS RPT_ITS
Comprehensive CCD (C-CDA v2.1)  
  
                          Created on: February 3, 2025  
  
  
MIHAELA HEATON  
External Reference #: CDR,PersonID:[id number]  
: 1945  
Sex: Male  
  
Demographics  
  
  
                                        Address             [address] [id number]  
   
                                        Home Phone          [phone]  
   
                                        Home Phone          [phone]  
   
                                        Home Phone          [phone]  
   
                                        Preferred Language  en  
   
                                        Marital Status        
   
                                        Ep[employer] Affiliation Unknown  
   
                                        Race                White  
   
                                        Ethnic Group        Not  or Lati  
no  
  
  
Author  
  
  
                                        Organization        [facility]  
  
  
Care Team Providers  
  
  
                                Care Team Member Name Role            Phone  
   
                                Jaydon Roth Unavailable     Unavailable  
   
                                Ijeoma MCCAIN, Regulo BARAKAT Unavailable     [phone]  
[phone]  
   
                                Dariana RN, Ana SHIELDS Unavailable     [phone]  
[phone]  
   
                                Dariana RN, Ana SHIELDS Unavailable     [phone]  
[phone]  
   
                                Dariana RN, Ana SHIELDS Unavailable     [phone]  
[phone]  
   
                                Tosin Cheng Unavailable     Unavailable  
   
                                Dariana RN, Ana SHIELDS Unavailable     [phone]  
[phone]  
   
                                Hailee Munoz Unavailable     Unavailable  
   
                                Jaydon Roth Unavailable     Unavailable  
   
                                Hailee Munoz Unavailable     Unavailable  
   
                                Hailee Munoz Unavailable     Unavailable  
   
                                Dariana RN, Ana SHIELDS Unavailable     [phone]  
[phone]  
   
                                Hailee Munoz Unavailable     Unavailable  
   
                                Dariana RN, Ana SHIELDS Unavailable     [phone]  
[phone]  
   
                                CITLALY Chase, Ana REN Unavailable     [phone]  
[phone]  
   
                                Mirela INFANTE, Kaylin LOPEZ  Unavailable     Unavailable  
   
                                Jake Jones Unavailable     [phone]  
   
                                Unavailable     Unavailable     [phone]  
   
                                Rosamaria, Dr. Jake Madsen Primary [facility]    Unavailab  
juliana Landers, Dr. Jonathan Fofana Attending       Marcelinav  
yonatan Jones, Dr. Jake Madsen Primary Care    Unavailab  
juliana Landers, Dr. Jonathan Fofana Attending       Unav  
ailashleigh Gaffney, Dr. Amaury Berkowitz Attending       Unavail  
able  
   
                                Gulshan, Dr. Amaury Berkowitz Referring       Unavail  
able  
   
                                Rosamaria, Dr. Jake Madsen Primary Care    Unavailab  
juliana Gaffney, Dr. Amaury Berkowitz Admitting       Unavail  
able  
   
                                Rosamaria, Dr. Jake Madsen Primary Care    Unavailab  
juliana Art, Dr. Monse Shepherd Attending       Unava  
ilashleigh Jones, Dr. Jake Madsen Primary Care    Unavailab  
le  
   
                                La Fontaine, Mervin Ky Negra Attending       Jake Cha MD Primary Care Provider [phone]   
   
                                Rosamaria, Dr. Jake Madsen Attending       Unavailab  
le  
   
                                Jones, Dr. Jake Madsen Referring       Unavailab  
le  
   
                                Jones, Dr. Jake Madsen Primary Care    Unavailab  
le  
   
                                Jones, Dr. Jake Madsen Attending       Unavailab  
le  
   
                                Jones, Dr. Jake Madsen Referring       Unavailab  
le  
   
                                Jones, Dr. Jake Madsen Primary Care    Unavailab  
le  
   
                                Jones, Dr. Jake Madsen Attending       Unavailab  
le  
   
                                Jones, Dr. Jake Madsen Referring       Unavailab  
le  
   
                                Jones, Dr. Jake Madsen Primary Care    Unavailab  
le  
   
                                Jones, Dr. Jake Madsen Attending       Unavailab  
le  
   
                                Jones, Dr. Jake Madsen Primary Care    Unavailab  
le  
   
                                Jones, Dr. Jake Madsen Attending       Unavailab  
le  
   
                                Jones, Dr. Jake Madsen Primary Care    Unavailab  
le  
   
                                Jones, Dr. Jake Madsen Attending       Unavailab  
le  
   
                                Jones, Dr. Jake Madsen Referring       Unavailab  
le  
   
                                Jones, Dr. Jake Madsen Primary Care    Unavailab  
le  
   
                                Chato, MsMervin Berkowitz Attending       Unavailabl  
e  
   
                                Chato, Ms. Kaley Berkowitz Referring       Unavailabl  
e  
   
                                Rosamaria, Dr. Jake Madsen Primary Care    Unavailab  
le  
   
                                La Fontaine, Mervin Omer Referring       Unavailabl  
e  
   
                                Rosamaria, Dr. Jake Madsen Primary Care    Unavailab  
le  
   
                                Shaun, Mervin Ky Negra Attending       Jake Cha MD Unavailable     [phone]  
[phone]  
   
                                JAKE JONES Referring       Unavailable  
   
                                JAKE JONES Primary Care    Unavailable  
   
                                JAKE JONES Primary Care    Unavailable  
   
                                JAKE JONES Primary Care    Unavailable  
   
                                JAKE JONES Primary Care    Unavailable  
   
                                JAKE JONES Primary Care    Unavailable  
   
                                JAKE JONES Primary Care    Unavailable  
   
                                JAKE JONES Attending       Unavailable  
   
                                JAKE JONES Primary Care    Unavailable  
   
                                JAKE JONES Referring       Unavailable  
   
                                JAKE JONES Primary Care    Unavailable  
   
                                JAKE JONES Attending       Unavailable  
   
                                JAKE JONES Referring       Unavailable  
   
                                JAKE JONES Primary Care    Unavailable  
   
                                JAKE JONES Attending       Unavailable  
   
                                JAKE JONES Primary Care    Unavailable  
   
                                JAKE JONES Primary Care    Unavailable  
   
                                KALEY VICENTE  Attending       Unavailable  
   
                                JAKE JONES Primary Care    Unavailable  
   
                                Eber Bazan DO Primary Care Provide  
[phone]  
   
                                SYSTEM, PROVIDER NOT IN Attending       Unavaila  
ble  
   
                                EBER BAZAN Primary Care    Unavail  
able  
   
                                SYSTEM, PROVIDER NOT IN Referring       Unavaila  
ble  
   
                                HORTENSIAEBER VASQUEZ Primary Care    Unavail  
able  
   
                                SYSTEM, PROVIDER NOT IN Referring       Unavaila  
ble  
   
                                SYSTEM, PROVIDER NOT IN Attending       Unavaila  
ble  
   
                                EBER BAZAN Primary Care    Unavail  
able  
   
                                KAROLYN DALY Attending       Unavail  
able  
   
                                EBER BAZAN Primary Care    Unavail  
able  
   
                                SABINO MAHONEY Attending       Unavailable  
   
                                EBER BAZAN Primary Care    Unavail  
able  
   
                                EBER BAZAN Attending       Unavail  
able  
   
                                EBER BAZAN Primary Care    Unavail  
able  
   
                                EBER BAZAN Attending       Unavail  
able  
   
                                EBER BAZAN Primary Care    Unavail  
able  
   
                                ADLY, MANUEL ADLY MANUEL Attending       Unava  
ilable  
   
                                EBER BAZAN Admitting       Unavail  
able  
   
                                EBER BAZAN Referring       Unavail  
able  
   
                                ADLY, MANUEL ADLY MANUEL Attending       Unava  
ilable  
   
                                EBER BAZAN Primary Care    Unavail  
able  
   
                                EBER BAZAN Primary Care    Unavail  
able  
   
                                DIMPLE CARTAGENA Attending       Unavailable  
   
                                SABINO MAHONEY Referring       Unavailable  
   
                                SABINO MAHONEY Admitting       Unavailable  
   
                                EBER BAZAN Primary Care    Unavail  
able  
   
                                ADLY, MANUEL ADLY MANUEL Referring       Unava  
ilable  
   
                                ADLY, MANUEL ADLY AMNUEL Admitting       Unava  
ilable  
   
                                EBER BAZNA Primary Care    Unavail  
able  
   
                                SABINO MAHONEY Referring       Unavailable  
   
                                SABINO MAHONEY Attending       Unavailable  
   
                                EBER BAZAN Primary Care    Unavail  
able  
   
                                ADLY, MANUEL ADLY MANUEL Attending       Unava  
ilable  
   
                                ADLY, MANUEL ADLY MANUEL Referring       Unava  
ilable  
   
                                EBER BAZAN Primary Care    Unavail  
able  
   
                                ADLY, MANUEL ADLY MANUEL Attending       Unava  
ilable  
   
                                ADLY, MANUEL ADLY MANUEL Referring       Unava  
ilable  
   
                                EBER BAZAN Primary Care    Unavail  
able  
   
                                SABINO MAHONEY Attending       Unavailable  
   
                                SABINO MAHONEY Referring       Unavailable  
   
                                JOVANNI KERN Attending       Unavail  
able  
   
                                JAKE JONES Primary Care    Unavailable  
   
                                ASAD QUIROZ Attending       Unava  
ilJAKE Galeana Primary Care    Unavailable  
   
                                EBER BAZAN Primary Care    Unavail  
able  
   
                                SUSY MAHONEY Attending       Unavailable  
  
  
  
Allergies  
  
  
                                                    Allergy   
Classification                          Reported   
Allergen(s)               Allergy Type              Date of   
Onset                     Reaction(s)               Facility  
   
                                                      
(20 sources)                            atorvastatin;   
Translations:   
[ATORVASTATIN]            drug allergy                
7                                       Myalgia, Other   
(See Comments)                          [facility]   
[facility]  
Work Phone:   
[phone]  
   
                                                      
(20 sources)                            Amiodarone;   
Translations:   
[AMIODARONE]              Drug Allergy                
4                                       Other, Unknown,   
Other (See   
Comments)                               [facility]  
Work Phone:   
[phone]  
  
  
  
Medications  
Current Medications  
  
  
  
                      Medication Drug Class(es) Dates      Sig (Normalized) Sig   
(Original)  
   
                                                    [id number]   
actuat albuterol   
0.09 mg/actuat   
metered dose   
inhaler  
(20 sources)                            beta2-Adrenergic   
Agonist                                 Start:   
2024  
End:   
2024                              take 2 puff(s) by   
inhalation every six   
hours as needed for   
wheezing                                albuterol 90   
mcg/actuation   
inhaler Inhale 2   
(two) puffs every   
6 (six) hours as   
needed for   
wheezing . 1 g   
2024 Active  
  
  
  
                                        Start: 2019   take 2 puff(s) by in  
halation every   
four hours as needed                    Albuterol Sulfate  (90   
Base) MCG/ACT Inhalation Aerosol   
Solution INHALE 2 PUFFS Every 4   
hours PRN Quantity: 3 Refills: 3   
Ordered: 7-Dec-2019 DO Start :   
7-Dec-2019 Active  
   
                                        Start: 2019   take 2 puff(s) by in  
halation every   
four hours as needed                    Albuterol Sulfate  (90   
Base) MCG/ACT Inhalation Aerosol   
Solution INHALE 2 PUFFS Every 4   
hours PRN Quantity: 3 Refills: 3   
Ordered: 7-Dec-2019 DO Start :   
7-Dec-2019 Active  
  
  
  
                                                    amiodarone   
hydrochloride 200   
mg oral tablet  
(1 source)                Antiarrhythmic            Start:   
2023  
End: 01-                         take 0.5   
tablet by   
mouth once   
daily                                   amiodarone   
(Pacerone) 200 mg   
tablet Take 0.5   
tablets (100 mg)   
by mouth once   
daily. 0   
2023   
01/10/2024   
Discontinued   
(Therapy   
completed)  
   
                                                    amLODIPine 5 mg   
oral tablet  
(15 sources)                            Dihydropyridine   
Calcium Channel   
Blocker                                 Start:   
10-  
End: 07-                         take 1   
tablet by   
mouth every   
twenty-four   
hours as   
needed                                  amLODIPine   
(Norvasc) 5 mg   
tablet Take 1   
tablet (5 mg) by   
mouth once daily   
as needed (high   
BP). 90 tablet 3   
07/10/2024   
07/10/2025 Active  
  
  
  
                                Start: 2021                 amLODIPine Bes  
ylate 5 MG Oral Tablet Take 1 tablet daily   
Quantity: 90 Refills: 1 Ordered: 2022 Jake Jones MD   
Start : 16-Dec-2021 Active send to [place] address [address] [place] [id number]  
  
  
  
                                                    azelastine   
hydrochloride 0.137   
mg/actuat metered   
dose nasal spray  
(2 sources)                             Histamine-1   
Receptor Antagonist                       
End: 01-                         take 1   
spray(s) nasal   
route twice   
daily                                   azelastine   
(Astelin) 137 mcg   
(0.1 %) nasal   
spray Administer   
1 spray into each   
nostril 2 times a   
day. Use in each   
nostril as   
directed 0   
01/10/2024   
Discontinued   
(Therapy   
completed)  
   
                                                    azithromycin 250 mg   
oral tablet  
(3 sources)                             Macrolide   
Antimicrobial                           Start:   
2024  
End: 2025                                     azithromycin   
(ZITHROMAX) 250   
MG tablet   
Indications:   
Acute bronchitis,   
unspecified   
organism 2   
tablets day one,   
then 1 tablet a   
day until gone .   
6 tablet   
2024   
Discontinued   
(Patient   
Discharge)  
  
  
  
                                                    Start: 2024  
End: 2024                                     azithromycin (Zithromax) 250  
 mg tablet Indications:   
Pharyngitis,   
unspecified etiology Take 2 tablets (500 mg) by mouth once daily   
for 1 day, THEN 1 tablet (250 mg) once daily for 4 days. Take 2   
tabs (500 mg) by mouth today, than 1 daily for 4 days.. 6 tablet   
2024 Active  
  
  
  
                                                    calcium carbonate 500 mg   
chewable tablet  
(20 sources)                                        Start: 2019  
End: 2023                                     calcium carbonate (Tums) 200  
 mg   
calcium chewable tablet Chew 1   
tablet (500 mg) twice a day. 0   
2019   
Discontinued (Therapy completed)  
  
  
  
                                Start: 2019                 Calcium Carbon  
ate 500 MG CHEW CHEW 1   
TABLET Twice daily Quantity: 0   
Refills: 0 Ordered: 7-Dec-2019 DO   
Start : 7-Dec-2019 Active  
   
                                        Start: 2017   take 1 tablet by heide  
 twice   
daily                                   CALCIUM CARBONATE 600 MG TABS One   
tablet by mouth twice daily   
 CALCIUM CARBONATE   
[id number] Ana Westbrook RN  
  
  
  
                                                    fexofenadine   
hydrochloride 180 mg   
oral tablet  
(2 sources)                             Histamine-1   
Receptor   
Antagonist                                
End: 01-                         take 1   
tablet by   
mouth once   
daily                                   fexofenadine   
(Allegra) 180 mg   
tablet Take 1   
tablet (180 mg) by   
mouth once daily.   
0 01/10/2024   
Discontinued   
(Therapy   
completed)  
   
                                                    30 actuat   
fluticasone furoate   
0.1 mg/actuat /   
umeclidinium 0.0625   
mg/actuat /   
vilanterol 0.025   
mg/actuat dry powder   
inhaler  
(20 sources)                            Anticholinergic,   
Corticosteroid,   
beta2-Adrenergic   
Agonist             Start: 2024                       Trelegy Ellipta   
100-62.5-25 mcg   
DsDv 2024   
Active  
  
  
  
                                        Start: 2019   take 1 puff(s) by   
inhalation once daily                   fluticasone-umeclidin-vilanter   
(TRELEGY-ELLIPTA) 100-62.5-25 mcg blister   
with device Inhale 1 puff once daily.   
2019 Active  
  
  
  
                                                    furosemide 40 mg   
oral tablet  
(3 sources)               Loop Diuretic               
End: 01-                         take 1 tablet   
by mouth once   
daily                                   furosemide (Lasix)   
40 mg tablet Take 1   
tablet (40 mg) by   
mouth once daily. 0   
01/10/2024   
Discontinued   
(Therapy completed)  
  
  
  
                                                      
End: 2023                         take 1 tablet by mouth once   
daily                                   furosemide (Lasix) 20 mg tablet Take   
1 tablet (20 mg) by mouth once   
daily. 0 2023 Discontinued   
(Therapy completed)  
  
  
  
                                                    hydroCHLOROthiazide   
12.5 mg / lisinopril   
20 mg oral tablet  
(20 sources)                            Thiazide   
Diuretic,   
Angiotensin   
Converting   
Enzyme   
Inhibitor                               Start:   
2025  
End:   
2025                              take 1   
tablet   
by mouth   
once   
daily                                   lisinopriL-hydrochlorothiazide   
(PRINZIDE,ZESTORETIC) 20-12.5   
mg per tablet Indications:   
Hypertension goal BP (blood   
pressure) Take 1 (one) tablet   
by mouth daily . 90 tablet   
2025 Active  
  
  
  
                                                    Start: 2024  
End: 2025                                     lisinopriL-hydrochlorothiazi  
de   
(PRINZIDE,ZESTORETIC) 20-25 mg per tablet   
2024 Discontinued  
   
                                                    Start: 2023  
End: 2024                         take 1 tablet by mouth   
once daily                              lisinopriL-hydrochlorothiazide 20-25 mg   
tablet Indications: hypertension TAKE 1   
TABLET BY MOUTH ONCE DAILY 90 tablet 3   
2023 Active  
   
                                        Start: 2019   take 0.5 tablet by   
mouth once daily                        Lisinopril-hydroCHLOROthiazide 20-25 MG   
Oral   
Tablet TAKE 0.5 TABLET Daily Quantity: 45   
Refills: 3 Ordered: 2023 Jake Jones MD Start : 5-Dec-2019 Active  
   
                                        Start: 2017   take 1 tablet by heide  
th   
once daily                              LISINOPRIL-HYDROCHLOROTHIAZIDE 10-12.5 M  
G   
TABS One tablet by mouth daily    
LISINOPRIL-HYDROCHLOROTHIAZIDE [id number]   
Regulo Raphael MD  
   
                                        Start: 2017   take 1 tablet by heide  
th   
once daily                              LISINOPRIL-HYDROCHLOROTHIAZIDE 20-25 MG   
TABS   
One tablet by mouth daily    
LISINOPRIL-HYDROCHLOROTHIAZIDE [id number]   
Ana Westbrook RN  
  
  
  
                                                    ipratropium bromide   
0.042 mg/actuat   
metered dose nasal   
spray  
(1 source)          Anticholinergic     Start: 2023   take 2 spray(s)   
nasal route   
three times   
daily                                   ipratropium   
(Atrovent) 42 mcg   
(0.06 %) nasal   
spray Administer 2   
sprays into each   
nostril 3 times a   
day. 0 2023   
Active  
   
                                                    L.acid/L.casei/B.bi  
f/B.tracie/FOS   
(PROBIOTIC BLEND   
ORAL)  
(4 sources)                             Start: 10-   take 1 capsule   
by mouth once   
daily                                   L.acid/L.casei/B.b  
if/B.tracie/FOS   
(PROBIOTIC BLEND   
ORAL) Take 1   
capsule by mouth   
once daily.   
10/17/2023 Active  
  
  
  
                                        Start: 10-   take 1 capsule by   
mouth once daily                        L.acid/L.casei/B.bif/B.tracie/FOS (PROBIOTI  
C   
BLEND ORAL) Take 1 capsule by mouth once   
daily. 0 10/17/2023 Active  
  
  
  
                                                    latanoprost 0.05   
mg/ml ophthalmic   
solution  
(20 sources)        Prostaglandin Analog Start: 2019   take 1   
drop(s) into   
the eye(s)   
once daily at   
bedtime                                 latanoprost   
(Xalatan) 0.005 %   
ophthalmic   
solution   
Administer 1 drop   
into both eyes   
once daily at   
bedtime.   
2019 Active  
  
  
  
                                        Start: 2019   take 1 drop(s) into   
the eye(s)   
at bedtime                              Latanoprost 0.005 % Ophthalmic   
Solution INSTILL 1 DROP IN BOTH   
EYES AT BEDTIME. Quantity: 3   
Refills: 1 Ordered: 7-Dec-2019 DO   
Start : 7-Dec-2019 Active  
   
                                Start: 2017                 LATANOPROST 0.  
005 % SOLN as   
directed  LATANOPROST   
[id number] Ana Westbrook RN  
   
                                                            take 1 drop(s) into   
the eye(s)   
once daily                              latanoprost (XALATAN) 0.005 %   
ophthalmic solution 1 (one) drop   
nightly . Active  
  
  
  
                                                    levalbuterol 2.5   
mg/ml inhalation   
solution  
(4 sources)                             beta2-Adrenergic   
Agonist                                 Start:   
2025  
End: 2026                                     levalbuterol   
(XOPENEX) 1.25   
mg/0.5 mL nebulizer   
solution   
Indications:   
Asthma-COPD overlap   
syndrome (HCC) Take   
0.5 mL (1.25 mg   
total) by   
nebulization every   
4 (four) hours as   
needed for wheezing   
. 1 each 2025 Active  
   
                                                    levothyroxine   
sodium 0.05 mg   
oral tablet  
(17 sources)              l-Thyroxine               Start:   
01-  
End: 2025                         take 1   
tablet by   
mouth once   
daily in the   
morning                                 levothyroxine   
(SYNTHROID,   
LEVOTHROID) 50 MCG   
tablet Indications:   
Acquired   
hypothyroidism Take   
1 (one) tablet (50   
mcg total) by mouth   
every morning . 90   
tablet 2025   
Active  
  
  
  
                                                    Start: 11-  
End: 2024                         take 0.5 tablet by mouth once   
daily, then take 1 tablet by   
mouth once daily                        levothyroxine (Synthroid, Levoxyl) 50   
mcg tablet Indications: Acquired   
hypothyroidism Take 0.5 tablets (25   
mcg) by mouth once daily for 8 days,   
THEN 1 tablet (50 mcg) once daily. 30   
tablet 1 11/15/2023 2024 Active  
  
  
  
                                                    magnesium oxide 500 mg   
oral tablet  
(20 sources)                            Start: 2019   take 1 tablet by   
mouth once daily                        magnesium oxide 500 mg   
tablet Take 1 tablet   
(500 mg) by mouth once   
daily. 2019   
Active  
  
  
  
                                        Start: 2017   take 1 tablet by heide  
th once   
daily                                   MAGNESIUM OXIDE 500 MG TABS One   
tablet by mouth daily    
MAGNESIUM OXIDE [id number] Ana Westbrook RN  
  
  
  
                                                    24 hr metoprolol   
succinate 25 mg   
extended release   
oral tablet  
(20 sources)                            beta-Adrenergic   
Blocker                                 Start:   
2025  
End: 2025                         take 1   
tablet by   
mouth once   
daily                                   metoprolol   
succinate   
(TOPROL-XL) 25 MG   
24 hr tablet   
Indications:   
Paroxysmal atrial   
fibrillation (HCC)   
Take 1 (one)   
tablet (25 mg   
total) by mouth   
daily . 90 tablet   
3 2025   
Active  
  
  
  
                                                    Start: 2024  
End: 2025                                     metoprolol succinate (TOPROL  
-XL) 25   
MG 24 hr tablet 2024 Discontinued (Reorder   
(Suppress CancelRx Message to   
Pharmacy))  
   
                                        Start: 2023   take 1 tablet by heide  
th once   
daily                                   metoprolol succinate XL (Toprol-XL)   
25 mg 24 hr tablet Indications:   
Primary hypertension , Atrial   
fibrillation, unspecified type   
(Multi) TAKE 1 TABLET BY MOUTH DAILY   
90 tablet 3 2023 Active  
   
                                        Start: 2019   take 1 tablet by heide  
th once   
daily                                   metoprolol succinate XL (Toprol-XL)   
25 mg 24 hr tablet Take 1 tablet (25   
mg) by mouth once daily. 0   
2019 Active  
   
                                        Start: 2017   take 1 tablet by heide  
th once   
daily                                   METOPROLOL SUCCINATE ER 25 MG   
XR24H-TAB One tablet by mouth daily   
 METOPROLOL SUCCINATE   
[id number] Ana Westbrook RN  
   
                                        Start: 2017   take 1 tablet by heide  
th once   
daily                                   TOPROL XL 50 MG XR24H-TAB (ER) One   
tablet by mouth daily    
METOPROLOL SUCCINATE [id number]   
Ana Chase PA-C  
   
                                        Start: 2017   take 1 tablet by heide  
th once   
daily                                   TOPROL XL 50 MG XR24H-TAB One tablet   
by mouth daily  METOPROLOL   
SUCCINATE [id number] Regulo Raphael MD  
   
                                        Start: 2017   take 0.5 tablet by m  
out once   
daily                                   TOPROL XL 50 MG XR24H-TAB 1/2 tablet   
by mouth daily  METOPROLOL   
SUCCINATE [id number] Kaylin Dietz RN  
   
                                        Start: 2017   take 1 tablet by heide  
th once   
daily                                   TOPROL XL 50 MG XR24H-TAB (ER) One   
tablet by mouth daily    
METOPROLOL SUCCINATE [id number]   
Ana Chase PA-C  
  
  
  
                                                    nitroglycerin 0.4 mg   
sublingual tablet  
(20 sources)    Nitrate Vasodilator Start: 2019                 nitroglyce  
rin   
(Nitrostat) 0.4 mg SL   
tablet Place 1 tablet   
(0.4 mg) under the   
tongue every 5 minutes   
if needed. 2019   
Active  
  
  
  
                                Start: 2017                 NITROGLYCERIN   
0.4 MG SUBL 1 tablet under the tongue every 5   
minutes times 3 as needed for chest pain.    
NITROGLYCERIN [id number] Ana Westbrook RN  
  
  
  
                                                    pantoprazole 40 mg   
delayed release oral   
tablet  
(20 sources)                            Proton Pump   
Inhibitor                               Start: 2024  
End: 2025                         take 1 tablet   
by mouth once   
daily                                   pantoprazole   
(PROTONIX) 40 MG   
tablet Indications:   
History of   
Frye's esophagus   
Take 1 (one) tablet   
(40 mg total) by   
mouth daily . 90   
tablet 3 2025   
Active  
  
  
  
                                        Start: 2019   take 1 tablet by heide  
th once   
daily                                   pantoprazole (ProtoNix) 40 mg EC   
tablet Take 1 tablet (40 mg) by   
mouth once daily. 0 2019   
Active  
   
                                        Start: 2019   take 1 tablet by heide  
th twice   
daily 30 minutes before   
breakfast                               Pantoprazole Sodium 40 MG Oral   
Tablet Delayed Release TAKE 1   
TABLET BY MOUTH TWICE DAILY 30   
MINUTES BEFORE BREAKFAST AND DINNER   
Quantity: 180 Refills: 3 Ordered:   
2021 Jake Jones MD Start :   
5-Dec-2019 Active  
   
                                        Start: 2017   take 1 tablet by heide  
th once   
daily                                   PANTOPRAZOLE SODIUM 40 MG TBEC One   
tablet by mouth daily    
PANTOPRAZOLE SODIUM [id number]   
Ana Westbrook RN  
  
  
  
                                                    pravastatin sodium   
80 mg oral tablet  
(20 sources)                            HMG-CoA Reductase   
Inhibitor                 Start: 01-         take 1 tablet   
by mouth once   
daily                                   pravastatin   
(PRAVACHOL) 80 MG   
tablet Indications:   
Coronary artery   
disease involving   
native coronary   
artery of native   
heart without   
angina pectoris   
Take 1 (one) tablet   
(80 mg total) by   
mouth nightly . 90   
tablet 3 01/15/2025   
Active  
  
  
  
                                                    Start: 2023  
End: 01-                         take 1 tablet by mouth once   
daily                                   pravastatin (PRAVACHOL) 40 MG tablet   
Indications: Paroxysmal atrial   
fibrillation (HCC) Take 1 (one)   
tablet (40 mg total) by mouth   
nightly . 90 tablet 3 2025   
01/15/2025 Discontinued  
   
                                        Start: 2019   take 1 tablet by heide  
th once   
daily                                   pravastatin (Pravachol) 40 mg tablet   
Take 1 tablet (40 mg) by mouth once   
daily. 0 2019 Active  
  
  
  
                                                    rivaroxaban 20 mg   
oral tablet  
(20 sources)                            Factor Xa   
Inhibitor                               Start: 2023  
End: 2025                         take 1 tablet   
by mouth once   
daily                                   Xarelto 20 mg Tab   
Indications:   
Paroxysmal atrial   
fibrillation (HCC)   
Take 1 (one) tablet   
(20 mg total) by   
mouth daily . 90   
tablet 3 2025   
Active  
  
  
  
                                        Start: 2019   take 1 tablet by heide  
th once   
daily                                   rivaroxaban (Xarelto) 20 mg tablet   
Take 1 tablet (20 mg) by mouth once   
daily. 0 2019 Active  
   
                                        Start: 2017   take 1 tablet by heide  
th once   
daily                                   XARELTO 20 MG TABS One tablet by   
mouth daily  RIVAROXABAN   
[id number] Ana Westbrook RN  
  
  
  
                                                    sodium chloride 9 mg/ml   
inhalation solution  
(4 sources)                                         Start: 2025  
End: 2026                                     sodium chloride 0.9 % nebuli  
zer   
solution Indications:   
Asthma-COPD overlap syndrome   
(HCC) , Productive cough Take 3   
mL by nebulization as needed for   
wheezing (or excessive mucus) .   
90 mL 12 2025   
Active  
  
  
  
Completed/Discontinued Medications  
  
  
  
                      Medication Drug Class(es) Dates      Sig (Normalized) Sig   
(Original)  
   
                                                    aspirin 81 mg   
delayed release   
oral tablet  
(20 sources)                            Platelet   
Aggregation   
Inhibitor,   
Nonsteroidal   
Anti-inflammatory   
Drug                                    Start:   
2019                              take 1 tablet by   
mouth once daily                        Aspirin 81 MG   
Oral Tablet   
Delayed Release   
TAKE 1 TABLET   
DAILY. Quantity:   
0 Refills: 0   
Ordered:   
7-Dec-2019 DO   
Start :   
7-Dec-2019 Active  
  
  
  
                                        Start: 2017   take 1 tablet by heide  
 once   
daily                                   ASPIRIN EC 81 MG TBEC One tablet by   
mouth daily  ASPIRIN   
[id number] Ana Westbrook RN  
  
  
  
                                                    brompheniramine   
maleate 0.4 mg/ml   
/   
dextromethorphan   
hydrobromide 2   
mg/ml /   
pseudoephedrine   
hydrochloride 6   
mg/ml oral   
solution  
(1 source)                              alpha-Adrenergic   
Agonist,   
Uncompetitive   
N-methyl-D-aspartate   
Receptor Antagonist,   
Sigma-1 Agonist                         Start:   
2024  
End:   
2024                              take 5   
mL by   
mouth   
four   
times   
daily   
as   
needed                                  brompheniramine-pseudoePHEDrine-DM   
2-30-10 mg/5 mL syrup Take 5 mL by   
mouth 4 (four) times a day as   
needed . 120 mL 2024   
   
                                                    calcium citrate   
1040 mg oral   
tablet  
(15 sources)                                        Start:   
2017                              take 1   
tablet   
by   
mouth   
once   
daily                                   CALCIUM CITRATE 250 MG TABS One   
tablet by mouth daily    
CALCIUM CITRATE [id number] Regulo Raphael MD  
   
                                                    cholecalciferol   
400 unt oral   
tablet  
(3 sources)               Vitamin D                 Start:   
2017                              take 1   
tablet   
by   
mouth   
once   
daily                                   VITAMIN D3 400 UNIT TABS One tablet   
by mouth daily    
CHOLECALCIFEROL [id number] Regulo Raphael MD  
   
                                                    ciprofloxacin 500   
mg oral tablet  
(2 sources)                             Quinolone   
Antimicrobial                           Start:   
2022                              take 1   
tablet   
by   
mouth   
twice   
daily                                   Ciprofloxacin HCl - 500 MG Oral   
Tablet Take 1 tablet twice daily   
Quantity: 20 Refills: 0 Ordered:   
6-Sep-2022 Chato FELIPAKaley   
Start : 6-Sep-2022 Active  
   
                                                    clopidogrel 75 mg   
oral tablet  
(15 sources)                            P2Y12 Platelet   
Inhibitor                               Start:   
2017  
End:   
2017                              take 1   
tablet   
by   
mouth   
once   
daily                                   CLOPIDOGREL BISULFATE 75 MG TABS   
One tablet by mouth daily   
 CLOPIDOGREL BISULFATE   
[id number] Regulo Raphael MD  
   
                                                    ubidecarenone 300   
mg oral capsule  
(20 sources)                                        Start:   
2019                              take 1   
capsule   
by   
mouth   
once   
daily                                   Co Q-10 300 MG Oral Capsule TAKE 1   
CAPSULE Daily Quantity: 0 Refills:   
0 Ordered: 7-Dec-2019 DO Start :   
7-Dec-2019 Active  
  
  
  
                                        Start: 2017   take 3 tablets by mo  
uth once   
daily, then take 10 tablets by   
mouth                                   CO Q-10 100 MG CAPS 3 tablet by   
mouth daily  COENZYME Q10   
[id number] Ana Westbrook RN  
   
                                        Start: 2017   take 3 tablets by mo  
uth once   
daily, then take 10 tablets by   
mouth                                   CO Q-10 100 MG CAPS 3 tablet by   
mouth daily  COENZYME Q10   
[id number] Ana Westbrook RN  
  
  
  
                                                    cyclobenzaprine   
hydrochloride 10 mg   
oral tablet  
(2 sources)               Muscle Relaxant           Start:   
2022                              take 1   
tablet by   
mouth three   
times daily                             Cyclobenzaprine HCl -   
10 MG Oral Tablet   
TAKE 1 TABLET 3 times   
daily Quantity: 15   
Refills: 0 Ordered:   
22-Aug-2022 Ky Rice Start   
: 22-Aug-2022 Active  
   
                                                    1 ml denosumab 60   
mg/ml prefilled   
syringe  
(20 sources)                            RANK Ligand   
Inhibitor                               Start:   
2024  
End: 2024                                     60 mg, Subcutaneous,   
Once, On 24   
at 1245, For 1 dose,   
Prior to   
administration, bring   
to room temperature   
in original container   
(allow to stand 15 to   
30 minutes). Avoid   
vigorous shaking.   
Administer via SubQ   
injection in the   
upper arm, upper   
thigh, or abdomen.  
  
  
  
                                                    Start: 2024  
End: 2024                                     60 mg, Subcutaneous, Once, O  
n 24 at 1245, For 1 dose, Prior   
to administration, bring to room   
temperature in original container   
(allow to stand 15 to 30 minutes).   
Avoid vigorous shaking. Administer   
via SubQ injection in the upper   
arm, upper thigh, or abdomen.  
   
                                                    Start: 2024  
End: 2025                                     denosumab (Prolia) injection  
 60 mg  
   
                                Start: 2024                 Prolia 60 mg/m  
L Syrg 2024   
Active  
   
                                        Start: 2024   inject 1 mL by subcu  
taneous   
injection once                          denosumab (Prolia) 60 mg/mL syringe   
Indications: Age related   
osteoporosis, unspecified   
pathological fracture presence   
Inject 1 mL (60 mg total) under the   
skin 1 time for 1 dose. 1 mL 1   
2024 Active  
   
                                        Start: 10-   inject 1 mL by subcu  
taneous   
injection once                          denosumab (Prolia) 60 mg/mL syringe   
Indications: Age related   
osteoporosis, unspecified   
pathological fracture presence   
Inject 1 mL (60 mg total) under the   
skin 1 time for 1 dose. 1 mL 0   
10/11/2023 Active  
   
                                        Start: 2023   inject 1 mL by subcu  
taneous   
injection once                          denosumab (Prolia) 60 mg/mL syringe   
Indications: Age related   
osteoporosis, unspecified   
pathological fracture presence   
Inject 1 mL (60 mg) under the skin   
1 time for 1 dose. 1 mL 0   
2023 Active  
   
                                                    Start: 2023  
End: 2023                         inject 1 mL by subcutaneous   
injection once                          denosumab (Prolia) 60 mg/mL syringe   
Indications: Age related   
osteoporosis, unspecified   
pathological fracture presence   
Inject 1 mL (60 mg) under the skin   
1 time for 1 dose. 1 mL 0   
2023 Discontinued   
(Reorder)  
   
                                Start: 2019                 Prolia 60 MG/M  
L Subcutaneous   
Solution Prefilled Syringe 1 ml   
subcutaneous q6 months Quantity: 0   
Refills: 0 Ordered: 2019 DO   
Start : 2019 Active  
   
                                Start: 2017                 PROLIA 60 MG/M  
L SOLN one injection   
Q 6 months  DENOSUMAB   
[id number] Regulo Raphael MD  
   
                                Start: 2017                 PROLIA 60 MG/M  
L SOLN one injection   
Q 6 months  DENOSUMAB   
[id number] Regulo Raphael MD  
  
  
  
                                                    0.8 ml enoxaparin   
sodium 100 mg/ml   
prefilled syringe  
(13 sources)                            Low Molecular Weight   
Heparin             Start: 2017                       LOVENOX 80 MG/0.8ML   
SOLN one injection   
twice daily    
ENOXAPARIN SODIUM   
[id number] Regulo Raphael MD  
  
  
  
                                Start: 2017                 LOVENOX 80 MG/  
0.8ML SOLN one injection twice daily   
   
ENOXAPARIN SODIUM [id number] Regulo Raphael MD  
   
                                Start: 2017                 LOVENOX 80 MG/  
0.8ML SOLN one injection twice daily   
   
ENOXAPARIN SODIUM [id number] Ana Westbrook RN  
  
  
  
                                                    folic acid 1 mg   
oral tablet  
(20 sources)                                        Start: 2017  
End: 2017                         take 1 tablet   
by mouth once   
daily                                   FOLIC ACID 1 MG   
TABS One tablet by   
mouth daily   
 FOLIC   
ACID [id number]   
Regulo Raphael MD  
   
                                                    hyoscyamine sulfate   
0.125 mg sublingual   
tablet  
(20 sources)                            Start: 11-   take 1 tablet   
under the   
tongue three   
times daily   
as needed                               Levsin/SL 0.125 MG   
Sublingual Tablet   
Sublingual PLACE 1   
TABLET UNDER THE   
TONGUE 3 TIMES   
DAILY AS NEEDED.   
Quantity: 60   
Refills: 1   
Ordered:   
2022 Madan Morelos DO Start :   
15-Nov-2021 Active  
   
                                                    24 hr isosorbide   
mononitrate 30 mg   
extended release   
oral tablet  
(13 sources)                            Nitrate   
Vasodilator               Start: 2022         take 1 tablet   
by mouth once   
daily                                   Isosorbide   
Mononitrate ER 30   
MG Oral Tablet   
Extended Release   
24 Hour TAKE 1   
TABLET DAILY.   
Quantity: 90   
Refills: 3   
Ordered:   
14-Dec-2022 Jake Jones MD Start :   
14-Dec-2022 Active  
  
  
  
                                                    Start: 2017  
End: 2017                         take 1 tablet by mouth once   
daily                                   ISOSORBIDE MONONITRATE ER 30 MG   
XR24H-TAB One tablet by mouth daily   
(Imdur)    
ISOSORBIDE MONONITRATE [id number]   
Kaylin Dietz RN  
  
  
  
                                                    Oxygen  
(20 sources)                    Start: 2019                 Oxygen Oxygen   
concentrator via NC at   
2LPM at hs Quantity: 1   
Refills: 0 Ordered:   
16-Dec-2019 Jake Jones MD Start : 7-Dec-2019   
Active 2 Lpm via nasal   
cannula  
   
                                                    predniSONE 10 mg oral   
tablet  
(1 source)                                          Start: 2022  
End: 2022                                     predniSONE 10 MG Oral   
Tablet TAKE 3 TABLETS   
DAILY FOR 2 DAYS, 2   
TABLETS DAILY FOR 2 DAYS   
AND 1 TABLET DAILY FOR 2   
DAYS, THEN STOP.   
Quantity: 12 Refills: 0   
Ordered: 29-Aug-2022   
Jonathan Landers MD Start   
: 29-Aug-2022 End :   
6-Sep-2022 Complete  
   
                                                    PROBIOTIC PRODUCT  
(9 sources)                             Start: 2017   take 1 tablet by   
mouth once daily                        PROBIOTIC DAILY CAPS One   
tablet by mouth daily   
 PROBIOTIC   
PRODUCT [id number]   
Ana Westbrook RN  
   
                                                    PROBIOTIC PRODUCT  
(9 sources)                             Start: 2017   take 1 tablet by   
mouth once daily                        PROBIOTIC DAILY CAPS One   
tablet by mouth daily   
 PROBIOTIC   
PRODUCT [id number]   
Ana Westbrook RN  
  
  
  
                                        Start: 2017   take 1 tablet by heide  
 once   
daily                                   PROBIOTIC DAILY CAPS One tablet by   
mouth daily  PROBIOTIC   
PRODUCT [id number] Ana Westbrook RN  
  
  
  
                                                    simvastatin 40 mg oral   
tablet  
(18 sources)                            HMG-CoA Reductase   
Inhibitor           Start: 2017                       SIMVASTATIN 40 MG TA  
BS   
one tablet every night   
 SIMVASTATIN   
[id number] Ana Westbrook RN  
   
                                                    sucralfate 1000 mg oral   
tablet  
(13 sources)    Aluminum Complex Start: 06-                 Sucralfate 1   
GM Oral   
Tablet 1 tablet 4   
times a day after   
meals and at bedtime.   
Make into a slurry by   
swirling in water and   
then swallowing.   
Quantity: 120 Refills:   
2 Ordered: 15-Teo-2021   
Jake Jones MD Start   
: 15-Teo-2021 Active  
  
  
  
Problems  
Active Problems  
  
  
                      Problem Classification Problem    Date       Documented Da  
te Episodic/Chronic  
   
                                                    Abdominal hernia  
(8 sources)                             Hiatal hernia;   
Translations:   
[Diaphragmatic hernia   
without mention of   
obstruction or   
gangrene]                                                   Episodic  
   
                                                    Acute bronchitis  
(5 sources)                             Acute bronchitis;   
Translations: [Acute   
bronchitis,   
unspecified]                            Onset:   
  
4                         2024                Episodic  
   
                                                    Asthma  
(13 sources)                            Asthma-chronic   
obstructive pulmonary   
disease overlap   
syndrome;   
Translations:   
[Asthma-COPD overlap   
syndrome]                               Onset:   
10-  
4                         10-                Chronic  
   
                                                    Cancer of prostate  
(14 sources)                            Malignant tumor of   
prostate;   
Translations:   
[Malignant neoplasm of   
prostate]                               Onset:   
10-  
4                         10-                Chronic  
   
                                                    Cardiac dysrhythmias  
(20 sources)                            Atrial fibrillation;   
Translations: [Atrial   
fibrillation]                           Onset:   
  
3                         2017                Chronic  
   
                                                    Chronic obstructive   
pulmonary disease and   
bronchiectasis  
(20 sources)                            Chronic obstructive   
lung disease;   
Translations: [Severe   
chronic obstructive   
pulmonary disease]                      Onset:   
  
2                         2017                Chronic  
   
                                                    Chronic obstructive   
pulmonary disease and   
bronchiectasis  
(2 sources)                             Chronic obstructive   
pulmonary disease and   
bronchiectasis;   
Translations: [Other   
specified chronic   
obstructive pulmonary   
disease]                                Onset:   
10-  
4                                                     
   
                                                    Coronary   
atherosclerosis and   
other heart disease  
(20 sources)                            Coronary   
arteriosclerosis;   
Translations:   
[Coronary   
atherosclerosis of   
unspecified type of   
vessel, native or   
graft]                                  Onset:   
  
0                         2017                Chronic  
   
                                                    Disorders of lipid   
metabolism  
(20 sources)                            Hyperlipidemia;   
Translations: [Other   
and unspecified   
hyperlipidemia]                         Onset:   
  
3                         2017                Chronic  
   
                                                    Esophageal disorders  
(20 sources)                            Gastroesophageal   
reflux disease;   
Translations:   
[Esophageal reflux]                     Onset:   
  
2  
Resolved:   
  
3                         2017                Chronic  
   
                                                    Essential hypertension  
(20 sources)                            Hypertensive disorder;   
Translations:   
[Unspecified essential   
hypertension]                           Onset:   
  
3                         2017                Chronic  
   
                                                    Genitourinary symptoms   
and ill-defined   
conditions  
(8 sources)                             Dysuria; Translations:   
[Dysuria]                                                   Episodic  
   
                                                    Glaucoma  
(20 sources)                            Glaucoma;   
Translations:   
[Unspecified glaucoma]                  Onset:   
  
3                         2023                Chronic  
   
                                                    Heart valve disorders  
(4 sources)                             Mitral valve prolapse;   
Translations:   
[Nonrheumatic mitral   
(valve) prolapse]                       Onset:   
  
3                         01-                Chronic  
   
                                                    Immunizations and   
screening for   
infectious disease  
(20 sources)                            Patient encounter   
status; Translations:   
[Other specified   
vaccination]                            Onset:   
  
5                         2025                Episodic  
   
                                                    Infective arthritis   
and osteomyelitis   
(except that caused by   
tuberculosis or   
sexually transmitted   
disease)  
(2 sources)                             Osteomyelitis,   
unspecified;   
Translations:   
[Osteomyelitis,   
unspecified]                            Onset:   
10-  
2                                                   Chronic  
   
                                                    Malaise and fatigue  
(5 sources)                             Other fatigue;   
Translations:   
[Fatigue]                               Onset:   
  
4                                                   Episodic  
   
                                                    Occlusion or stenosis   
of precerebral   
arteries  
(4 sources)                             Right carotid artery   
stenosis;   
Translations:   
[Occlusion and   
stenosis of right   
carotid artery]                         Onset:   
  
4                         07-                Chronic  
   
                                                    Osteoarthritis  
(1 source)                              Unspecified   
osteoarthritis,   
unspecified site;   
Translations:   
[Unspecified   
osteoarthritis,   
unspecified site]                       Onset:   
  
2                                                   Chronic  
   
                                                    Osteoporosis  
(20 sources)                            Osteoporosis;   
Translations:   
[Osteoporosis,   
unspecified]                            Onset:   
  
3                         2023                Chronic  
   
                                                    Other acquired   
deformities  
(3 sources)                             Spondylolysis of   
cervical spine;   
Translations: [Other   
anomalies of spine]                                         Episodic  
   
                                                    Other circulatory   
disease  
(2 sources)                             Orthostatic   
hypotension;   
Translations:   
[Orthostatic   
hypotension]                            Onset:   
  
5                         2025                Episodic  
   
                                                    Other circulatory   
disease  
(1 source)                              Orthostatic   
hypotension;   
Translations:   
[Orthostatic   
hypotension]                            Onset:   
  
5                                                   Episodic  
   
                                                    Other gastrointestinal   
disorders  
(5 sources)                             Oropharyngeal   
dysphagia;   
Translations:   
[Dysphagia,   
oropharyngeal phase]                     2025          Episodic  
   
                                                    Other gastrointestinal   
disorders  
(2 sources)                             Personal history of   
other diseases of the   
digestive system;   
Translations:   
[Personal history of   
other diseases of the   
digestive system]                       Onset:   
10-  
4                                                   Episodic  
   
                                                    Other gastrointestinal   
disorders  
(4 sources)                             Dysphagia,   
oropharyngeal phase;   
Translations:   
[Dysphagia,   
oropharyngeal phase]                    Onset:   
  
5                                                   Episodic  
   
                                                    Other hereditary and   
degenerative nervous   
system conditions  
(20 sources)                            Essential tremor;   
Translations:   
[Essential and other   
specified forms of   
tremor]                                 Onset:   
  
3                         2023                Chronic  
   
                                                    Other hereditary and   
degenerative nervous   
system conditions  
(2 sources)                             Essential tremor;   
Translations:   
[Essential tremor]                      Onset:   
  
3                                                   Chronic  
   
                                                    Other injuries and   
conditions due to   
external causes  
(20 sources)                            Compression fracture ;   
Translations:   
[Fracture of   
unspecified bone,   
closed]                                                     Episodic  
   
                                                    Other injuries and   
conditions due to   
external causes  
(1 source)                              At moderate risk for   
fall; Translations:   
[History of falling]                     2025          Episodic  
   
                                                    Other injuries and   
conditions due to   
external causes  
(2 sources)                             History of falling;   
Translations: [History   
of falling]                             Onset:   
  
5                                                   Episodic  
   
                                                    Other lower   
respiratory disease  
(1 source)                              Dyspnea on exertion;   
Translations:   
[Shortness of breath]                     2023          Episodic  
   
                                                    Other lower   
respiratory disease  
(6 sources)                             Chronic cough;   
Translations: [Chronic   
cough]                                  Onset:   
  
5                         2025                Episodic  
   
                                                    Other lower   
respiratory disease  
(1 source)                              Productive cough ;   
Translations:   
[Productive cough]                      2025          Episodic  
   
                                                    Other nervous system   
disorders  
(1 source)                              Tremor; Translations:   
[Tremor, unspecified]                     2025          Episodic  
   
                                                    Other nervous system   
disorders  
(2 sources)                             Tremor, unspecified;   
Translations: [Tremor,   
unspecified]                            Onset:   
  
5                                                   Episodic  
   
                                                    Other nutritional;   
endocrine; and   
metabolic disorders  
(19 sources)                            Overweight in   
adulthood with body   
mass index of 25 or   
more but less than 30;   
Translations:   
[Overweight]                                                Episodic  
   
                                                    Other screening for   
suspected conditions   
(not mental disorders   
or infectious disease)  
(18 sources)                            Raised prostate   
specific antigen;   
Translations:   
[Elevated prostate   
specific antigen   
[PSA]]                                  Onset:   
  
3                         2023                Episodic  
   
                                                    Other skin disorders  
(1 source)                              Skin lesion;   
Translations:   
[Disorder of the skin   
and subcutaneous   
tissue, unspecified]                     2024          Episodic  
   
                                                    Other skin disorders  
(2 sources)                             Disorder of the skin   
and subcutaneous   
tissue, unspecified;   
Translations:   
[Disorder of the skin   
and subcutaneous   
tissue, unspecified]                    Onset:   
  
4                                                   Episodic  
   
                                                    Other upper   
respiratory disease  
(10 sources)                            Chronic rhinitis;   
Translations: [Chronic   
rhinitis]                               Onset:   
  
3                         2023                Chronic  
   
                                                    Other upper   
respiratory disease  
(1 source)                              Chronic rhinitis;   
Translations: [Chronic   
rhinitis]                               Onset:   
  
3                                                   Chronic  
   
                                                    Other upper   
respiratory disease  
(1 source)                              Nasal sinus problem;   
Translations: [Other   
specified disorders of   
nose and nasal   
sinuses]                                2023          Episodic  
   
                                                    Peripheral and   
visceral   
atherosclerosis  
(18 sources)                            Peripheral vascular   
disease; Translations:   
[Peripheral vascular   
disease, unspecified]                     2017          Chronic  
   
                                                    Residual codes;   
unclassified  
(11 sources)                            Body mass index 20-24   
- normal;   
Translations: [Body   
Mass Index between   
19-24, adult]                                               Episodic  
   
                                                    Spondylosis;   
intervertebral disc   
disorders; other back   
problems  
(20 sources)                            Arthropathy of lumbar   
facet joint;   
Translations:   
[Lumbosacral   
spondylosis without   
myelopathy]                             Onset:   
  
2                         2023                Chronic  
   
                                                    Thyroid disorders  
(20 sources)                            Acquired   
hypothyroidism;   
Translations:   
[Hypothyroidism,   
unspecified]                            Onset:   
  
3                         01-                Chronic  
   
                                                    Unclassified  
(9 sources)                             Percutaneous   
transluminal coronary   
angioplasty ;   
Translations:   
[Presence of coronary   
angioplasty implant   
and graft]                              Onset:   
  
7                         2017                  
   
                                                    Unclassified  
(1 source)                              Chronic atrial   
fibrillation,   
unspecified;   
Translations: [Chronic   
atrial fibrillation,   
unspecified]                            Onset:   
  
2                                                     
   
                                                    Unclassified  
(1 source)                              Other specified cough;   
Translations: [Other   
specified cough]                        Onset:   
  
5                                                     
   
                                                    Urinary tract   
infections  
(8 sources)                             Urinary tract   
infectious disease;   
Translations: [Urinary   
tract infection, site   
not specified]                                              Episodic  
  
  
Past or Other Problems  
  
  
                                                    Problem   
Classification  Problem         Date            Documented Date Episodic/Chronic  
   
                                                    Acquired foot   
deformities  
(11 sources)                            Talipes planus;   
Translations: [Flat   
foot]                                   Onset:   
  
3  
Resolved:   
  
3                         2023                Episodic  
   
                                                    Allergic reactions  
(3 sources)                             Solar degeneration;   
Translations: [Other   
skin changes due to   
chronic exposure to   
nonionizing radiation]                  Onset:   
  
4                         2024                Episodic  
   
                                                    Cancer of bronchus;   
lung  
(20 sources)                            History of malignant   
neoplasm of thoracic   
cavity structure;   
Translations: [Personal   
history of malignant   
neoplasm of bronchus   
and lung]                               Onset:   
  
2                                                   Episodic  
   
                                                    Congestive heart   
failure;   
nonhypertensive  
(10 sources)                            Acute diastolic heart   
failure; Translations:   
[Acute diastolic   
(congestive) heart   
failure]                                Onset:   
  
3  
Resolved:   
01-  
4                         2023                Chronic  
   
                                                    Coronary   
atherosclerosis and   
other heart disease  
(19 sources)                            History of coronary   
artery bypass grafting;   
Translations: [Presence   
of aortocoronary bypass   
graft]                                  Onset:   
  
7                         2017                Episodic  
   
                                                    Diabetes mellitus   
without complication  
(20 sources)                            Prediabetes;   
Translations: [Other   
abnormal glucose]                       Onset:   
  
3                         2023                Episodic  
   
                                                    E Codes: Fall  
(11 sources)                            Fall; Translations:   
[Unspecified fall]                      Onset:   
  
2                                                   Episodic  
   
                                                    Headache; including   
migraine  
(5 sources)                             Headache; Translations:   
[Nonintractable   
headache, unspecified   
chronicity pattern,   
unspecified headache   
type]                                   Onset:   
01-  
4                         2023                Episodic  
   
                                                    Mood disorders  
(4 sources)               Mood disorders            Onset:   
  
5                         2025                  
   
                                                    Other aftercare  
(1 source)                              Long term (current) use   
of anticoagulants;   
Translations: [Long   
term (current) use of   
anticoagulants]                         Onset:   
  
2                                                   Episodic  
   
                                                    Other aftercare  
(12 sources)                            Polypharmacy ;   
Translations: [Other   
long term (current)   
drug therapy]                           Onset:   
10-  
4                         10-                Episodic  
   
                                                    Other aftercare  
(2 sources)                             Other long term   
(current) drug therapy;   
Translations: [Other   
long term (current)   
drug therapy]                           Onset:   
10-  
4                                                   Episodic  
   
                                                    Other circulatory   
disease  
(4 sources)                             Carotid bruit;   
Translations: [Other   
specified symptoms and   
signs involving the   
circulatory and   
respiratory systems]                    Onset:   
  
3  
Resolved:   
07-  
4                         01-                Episodic  
   
                                                    Other connective   
tissue disease  
(18 sources)                            Snapping thumb   
syndrome; Translations:   
[Trigger finger   
(acquired)]                               
Resolved:   
  
2                                                   Episodic  
   
                                                    Other connective   
tissue disease  
(11 sources)                            Tendinitis of right   
posterior tibial   
tendon; Translations:   
[Tibialis tendinitis]                   Onset:   
  
3                         2023                Episodic  
   
                                                    Other connective   
tissue disease  
(2 sources)                             Pain in right foot;   
Translations: [Pain in   
right foot]                             Onset:   
10-  
2                                                   Episodic  
   
                                                    Other connective   
tissue disease  
(4 sources)                             Trigger thumb, left   
thumb; Translations:   
[Trigger thumb, left   
thumb]                                  Onset:   
  
2                                                   Episodic  
   
                                                    Other   
gastrointestinal   
disorders  
(20 sources)                            Dysphagia;   
Translations:   
[Dysphagia,   
unspecified]                            Onset:   
  
3                         2023                Episodic  
   
                                                    Other   
gastrointestinal   
disorders  
(20 sources)                            H/O: abdominal hernia;   
Translations: [Personal   
history of other   
diseases of digestive   
system]                                   
Resolved:   
  
1                                                   Episodic  
   
                                                    Other   
gastrointestinal   
disorders  
(2 sources)                             Dysphagia,   
pharyngoesophageal   
phase; Translations:   
[Dysphagia,   
pharyngoesophageal   
phase]                                  Onset:   
  
3                                                   Episodic  
   
                                                    Other   
gastrointestinal   
disorders  
(13 sources)                            History of Frye's   
esophagus;   
Translations: [Personal   
history of other   
diseases of the   
digestive system]                       Onset:   
10-  
4                         10-                Episodic  
   
                                                    Other lower   
respiratory disease  
(20 sources)                            Hypoxemia;   
Translations:   
[Hypoxemia]                             Onset:   
  
3                         2023                Episodic  
   
                                                    Other lower   
respiratory disease  
(1 source)                              Hypoxemia;   
Translations:   
[Hypoxemia]                             Onset:   
  
3                                                   Episodic  
   
                                                    Other non-epithelial   
cancer of skin  
(20 sources)                            Basal cell carcinoma of   
skin; Translations:   
[Basal cell carcinoma   
of skin, site   
unspecified]                            Onset:   
  
2                         2023                Episodic  
   
                                                    Other non-traumatic   
joint disorders  
(2 sources)                             Pain in right hip;   
Translations: [Pain in   
right hip]                              Onset:   
10-  
2                                                   Episodic  
   
                                                    Other skin disorders  
(20 sources)                            Seborrheic keratosis;   
Translations: [Other   
seborrheic keratosis]                   Onset:   
  
3                         2023                Episodic  
   
                                                    Other skin disorders  
(2 sources)                             Other seborrheic   
keratosis;   
Translations: [Other   
seborrheic keratosis]                   Onset:   
  
3                                                   Episodic  
   
                                                    Other skin disorders  
(3 sources)                             Lentiginosis;   
Translations: [Other   
melanin   
hyperpigmentation]                      Onset:   
  
4                         2024                Episodic  
   
                                                    Other upper   
respiratory   
infections  
(7 sources)                             Acute pharyngitis,   
unspecified;   
Translations:   
[Pharyngitis]                           Onset:   
  
4                                                   Episodic  
   
                                                    Pulmonary heart   
disease  
(20 sources)                            H/O: pulmonary embolus;   
Translations: [Personal   
history of pulmonary   
embolism]                               Onset:   
  
2                         01-                Episodic  
   
                                                    Residual codes;   
unclassified  
(4 sources)                             Daytime hypersomnia;   
Translations:   
[Hypersomnia,   
unspecified]                            Onset:   
10-  
3  
Resolved:   
07-  
4                         10-                Chronic  
   
                                                    Residual codes;   
unclassified  
(20 sources)                            History of clinical   
finding in subject;   
Translations: [Personal   
history of other   
specified diseases]                       
Resolved:   
06-  
0                                                   Episodic  
   
                                                    Spondylosis;   
intervertebral disc   
disorders; other back   
problems  
(4 sources)                             Cervicalgia;   
Translations:   
[Torticollis]                           Onset:   
  
4                                                   Episodic  
   
                                                    Sprains and strains  
(15 sources)                            Strain of neck muscle;   
Translations: [Sprain   
of neck]                                Onset:   
  
2                                                   Episodic  
   
                                                    Unclassified  
(9 sources)                                         Onset:   
  
3  
Resolved:   
  
5                         2023                  
   
                                                    Unclassified  
(1 source)                              Other specified cough;   
Translations: [Other   
specified cough]                        Onset:   
  
5                                                     
  
  
  
Results  
  
  
                          Test Name    Value        Interpretation Reference   
[facility]                                   [facility]  
   
                                                    XR MODIFIED BARIUM SWALLOWon  
 2025   
   
                                                    XR MODIFIED BARIUM   
SWALLOW                                 EXAMINATION:  
XR MODIFIED BARIUM   
SWALLOW  
HISTORY:  
ORDERING SYSTEM PROVIDED   
HISTORY: Oropharyngeal   
dysphagia,  
TECHNOLOGIST PROVIDED   
HISTORY:  
Illness/Other  
Reason for exam:   
Oropharyngeal dysphagia  
Encounter Type: Initial  
Additional signs and   
symptoms: .  
Fluoro dose in mGy: 46.18  
ORDERING SYSTEM PROVIDED   
DIAGNOSIS CODES:  
R13.12 Oropharyngeal   
dysphagia  
COMPARISON:  
None.  
TECHNIQUE:  
Patient was challenged   
with multiple   
consistencies of barium   
while  
undergoing video   
fluoroscopic imaging in   
the lateral projection.  
FLUORO DOSE: Fluoro dose   
in Ka,r mGy: 46.18  
FINDINGS:  
Honey: No penetration or   
aspiration.  
Nectar: No penetration or   
aspiration.  
Thin liquid: Material   
enters the airway, passes   
below the vocal folds,  
and no effort is made to   
eject.  
Puree: No penetration or   
aspiration.  
Mechanical soft: Not   
tested.  
Regular: No penetration   
or aspiration.  
IMPRESSION:  
Penetration and   
aspiration of thin   
liquids via straw   
presentation.  
Please see Speech   
Language Pathology Note   
for additional details   
and  
recommendations.  
[employer]  
Workstation ID: [id number]  
Dictated by: GABRIELA BOUCHER on  8:48:32 PM EST  
Transcribed by:   
ABHISHEK WERNER on   
 8:50:34   
PM EST  
Finalized by: GABRIELA BOUCHER on  10:27:51 PM EST Normal                                  [facility]  
   
                                        Comment on above:   Order Comment: Injur  
y/Trauma or Illness?:Illness/Other  
How long have you had these symptoms (acute/chronic)?:Acute  
Reason for exam?:Oropharyngeal dysphagia  
Type of Exam?:Initial  
Additional signs and symptoms?:.  
Fluoro time in minutes:1.29  
1 min and 29 sec fluoro  
Fluoro dose in mGy?:46.18   
   
                                                    CT CHEST WITH CONTRASTon    
   
                                                    CT CHEST WITH   
CONTRAST                                EXAMINATION:  
CT CHEST WITH CONTRAST  
HISTORY:  
ORDERING SYSTEM PROVIDED   
HISTORY: Cough,   
chronic/persisting > 8   
weeks,  
failed empiric treatment,  
TECHNOLOGIST PROVIDED   
HISTORY:  
Illness/Other  
Reason for exam: Cough,   
chronic/persisting > 8   
weeks, failed empiric  
treatmen  
Encounter Type:   
Subsequent/Follow-up  
Additional signs and   
symptoms: n  
ORDERING SYSTEM PROVIDED   
DIAGNOSIS CODES:  
R05.3 Chronic cough  
COMPARISON:  
CTA of the chest   
2018.  
TECHNIQUE:  
CT examination of the   
chest following the   
administration of   
intravenous  
contrast. Coronal and   
sagittal reformations   
were performed.  
Dose reduction techniques   
were achieved by using   
automated exposure  
control and/or adjustment   
of mA and/or kV according   
to patient size and/or  
use of iterative   
reconstruction technique.  
CONTRAST:  
IOPAMIDOL 370 MG   
IODINE/ML (76%)   
INTRAVENOUS SOLUTION - 75   
mL,  
FINDINGS:  
Lungs/pleura: Stable   
postoperative changes of   
left upper lobectomy.  
Stable pleural   
parenchymal opacity in   
the medial apex of the   
left lung  
from the prior study   
compatible with post   
treatment changes.  
Within the upper portion   
of the left lower lobe   
laterally are small  
clustered   
tree-in-bud-type nodules   
for example (series 4   
image 30 through  
36) consistent with   
bronchiolitis or   
aspiration.  
Chronic bandlike scar in   
the right lower lobe. No   
new suspicious nodules  
or acute airspace   
disease. No pleural   
effusion.  
Small amount of debris   
within the central   
airways consistent with   
retained  
mucus secretions. No   
suspicious central airway   
nodules. There are  
background changes of   
mild upper lobe   
predominant centrilobular   
emphysema.  
Lower neck: No gross   
masses or enlarged   
supraclavicular lymph   
nodes.  
Mediastinum:   
Postoperative changes   
from median sternotomy   
surgery/CABG.  
No pathologically   
enlarged mediastinal or   
hilar lymph nodes. There   
is  
again left-sided   
pericardial calcification   
with no pericardial   
effusion.  
Pulmonary arteries:   
Chronic mild enlargement   
suggesting underlying  
pulmonary arterial   
hypertension.  
Upper abdomen: Partially   
imaged dominant 8.1 x 7.6   
cm right renal cyst  
that may have a small   
peripheral septation,   
incompletely   
characterized on  
the study and not visible   
on the prior examination.  
Chest wall: No suspicious   
chest wall masses or   
enlarged axillary lymph  
nodes.  
Osseous structures:   
Multilevel chronic   
compression fractures   
with  
partially imaged   
postoperative changes of   
vertebroplasty at L1 and   
L2. No  
aggressive bone lesions.  
IMPRESSION:  
1. Stable postoperative   
changes from left upper   
lobectomy with chronic  
left apical pleural   
parenchymal opacity   
compatible with post   
treatment  
changes.  
2. Small clustered   
tree-in-bud type nodules   
in the left lung   
consistent  
with bronchiolitis or   
aspiration.  
3. No intrathoracic   
adenopathy.  
4. Partially imaged is an   
8.1 cm right renal cyst   
which may have a small  
peripheral septation   
incompletely   
characterized on this   
study but could be  
further evaluated with   
renal protocol CT or   
ultrasound.  
5. Additional stable   
chronic changes as above.  
[employer]  
Workstation ID: [id number]  
Dictated by: WILBER JAVED on  9:18:45 AM EST  
Transcribed by: PREMA JOHNSON on  9:44:53 AM EST  
Finalized by: WILBER JAVED on  12:05:36 PM EST Normal                                  [facility]  
   
                                        Comment on above:   Order Comment: Injur  
y/Trauma or Illness?:Illness/Other  
How long have you had these symptoms (acute/chronic)?:Acute  
Reason for exam?:sob and cough with increased difficulty   
breathing today hx of afib and CHF  
History of cancer?:prostate  
Surgeries, chemotherapy, or radiation?:partial lung removal,   
CABG, back surgery  
Type of Exam?:Initial  
Additional signs and symptoms?:na   
   
                                                    CBC (H/H, RBC, INDICES, WBC,  
 PLT)on 01-   
   
                                                    Erythrocyte   
distribution width   
(RBC) [Ratio]   13.1 %          Normal          11.0-15.0       [facility]  
   
                                        Comment on above:   Performed By: #### 2  
23, 303 ####  
[facility] [place] [address]  
Medical Director: James Montoya MD   
   
                                                    Hematocrit (Bld)   
[Volume fraction] 46.3 %          Normal          38.5-50.0       [facility]  
   
                                        Comment on above:   Performed By: #### 2  
, 303 ####  
[facility] [place] [address]  
Medical Director: James Montoya MD   
   
                                                    Hemoglobin (Bld)   
[Mass/Vol]      15.0 g/dL       Normal          13.2-17.1       [facility]  
   
                                        Comment on above:   Performed By: #### 2  
, 303 ####  
[facility] [place] [address]  
Medical Director: James Montoya MD   
   
                                                    MCH (RBC) [Entitic   
mass]           28.1 pg         Normal          27.0-33.0       [facility]  
   
                                        Comment on above:   Performed By: #### 2  
, 303 ####  
[facility] [place] [address]  
Medical Director: James Montoya MD   
   
                                                    MCHC (RBC)   
[Mass/Vol]      32.4 g/dL       Normal          32.0-36.0       [facility]  
   
                                        Comment on above:   Result Comment: For   
adults, a slight decrease in the   
calculated   
MCHC  
value (in the range of 30 to 32 g/dL) is most likely  
not clinically significant; however, it should be  
interpreted with caution in correlation with other  
red cell parameters and the patient's clinical  
condition.   
   
                                                            Performed By: #### 2  
, 303 ####  
[facility] [place] [address]  
Medical Director: James Montoya MD   
   
                                                    MCV (RBC) [Entitic   
vol]            86.9 fL         Normal          80.0-100.0      [facility]  
   
                                        Comment on above:   Performed By: #### 2  
23, 303 ####  
[facility] [place] [address]  
Medical Director: James Montoya MD   
   
                                                    Platelet mean volume   
(Bld) [Entitic vol] 9.5 fL          Normal          7.5-12.5        [facility]  
   
                                        Comment on above:   Performed By: #### 2  
23, 303 ####  
[facility] [place] [address]  
Medical Director: James Montoya MD   
   
                                                    Platelets (Bld)   
[#/Vol]         301 10*3/uL     Normal          140-400         [facility]  
   
                                        Comment on above:   Performed By: #### 2  
23, 303 ####  
[facility] [place] [address]  
Medical Director: James Montoya MD   
   
                      RBC (Bld) [#/Vol] 5.33 10*6/uL Normal     4.20-5.80  [facility]  
   
                                        Comment on above:   Performed By: #### 2  
23, 303 ####  
[facility] [place] [address]  
Medical Director: James Montoya MD   
   
                      WBC (Bld) [#/Vol] 7.6 10*3/uL Normal     3.8-10.8   [facility]  
   
                                        Comment on above:   Performed By: #### 2  
23, 303 ####  
[facility] [place] [address]  
Medical Director: James Montoya MD   
   
                                                    COMPREHENSIVE METABOLIC PANE  
L W/ANION GAPon 01-   
   
                      Albumin [Mass/Vol] 4.0 g/dL   Normal     3.6-5.1    [facility]  
   
                                        Comment on above:   Performed By: #### 2  
23, 303 ####  
[facility] [place] [address]  
Medical Director: James Montoya MD   
   
                                                    ALP [Catalytic   
activity/Vol]   62 U/L          Normal                    [facility]  
   
                                        Comment on above:   Performed By: #### 2  
23, 303 ####  
[facility] [place] [address]  
Medical Director: James Montoya MD   
   
                                                    ALT [Catalytic   
activity/Vol]   11 U/L          Normal          9-46            [facility]  
   
                                        Comment on above:   Performed By: #### 2  
23, 303 ####  
[facility] [place] [address]  
Medical Director: James Montoya MD   
   
                                                    AST [Catalytic   
activity/Vol]   14 U/L          Normal          10-35           [facility]  
   
                                        Comment on above:   Performed By: #### 2  
, 303 ####  
[facility] [place] [address]  
Medical Director: James Montoya MD   
   
                      Bilirubin [Mass/Vol] 0.5 mg/dL  Normal     0.2-1.2    [facility]  
   
                                        Comment on above:   Performed By: #### 2  
, 303 ####  
[facility] [place] [address]  
Medical Director: James Montoya MD   
   
                      Calcium [Mass/Vol] 9.0 mg/dL  Normal     8.6-10.3   [facility]  
   
                                        Comment on above:   Performed By: #### 2  
, 303 ####  
[facility] [place] [address]  
Medical Director: James Montoya MD   
   
                      Chloride [Moles/Vol] 103 mmol/L Normal          [facility]  
   
                                        Comment on above:   Performed By: #### 2  
23, 303 ####  
[facility] [place] [address]  
Medical Director: James Montoya MD   
   
                      CO2 [Moles/Vol] 30 mmol/L  Normal     20-32      [facility]  
   
                                        Comment on above:   Performed By: #### 2  
23, 303 ####  
[facility] of [address] [place] [address]  
Medical Director: James Montoya MD   
   
                                                    Creatinine   
[Mass/Vol]      1.01 mg/dL      Normal          0.70-1.28       [facility]  
   
                                        Comment on above:   Performed By: #### 2  
, 303 ####  
[facility] [address], [address] [place] [address]  
Medical Director: James Montoya MD   
   
                      ELECTROLYTE BALANCE 9 mmol/L (calc) Normal     7-17         
[facility]  
   
                                        Comment on above:   Performed By: #### 2  
, 303 ####  
[facility] [address], [address] [place] [address]  
Medical Director: James Montoya MD   
   
                                                    GFR/1.73 sq   
M.predicted among   
non-blacks MDRD   
(S/P/Bld) [Vol   
rate/Area]      76 mL/min/{1.73_m2} Normal          > OR = 60       [facility]  
   
                                        Comment on above:   Performed By: #### 2  
, 303 ####  
[facility] [place] [address]  
Medical Director: James Montoya MD   
   
                      Glucose [Mass/Vol] 135 mg/dL  Normal          [facility]  
   
                                        Comment on above:   Result Comment:  
Non-fasting reference interval  
For someone without known diabetes, a glucose  
value >125 mg/dL indicates that they may have  
diabetes and this should be confirmed with a  
follow-up test.   
   
                                                            Performed By: #### 2  
, 303 ####  
[facility] [address], [address] [place] [address]  
Medical Director: James Montoya MD   
   
                                                    Potassium   
[Moles/Vol]     3.9 mmol/L      Normal          3.5-5.3         [facility]  
   
                                        Comment on above:   Performed By: #### 2  
, 303 ####  
[facility] [place] [address]  
Medical Director: James Montoya MD   
   
                      Protein [Mass/Vol] 6.7 g/dL   Normal     6.1-8.1    [facility]  
   
                                        Comment on above:   Performed By: #### 2  
, 303 ####  
[facility] [address], [address] [place] [address]  
Medical Director: James Montoya MD   
   
                      Sodium [Moles/Vol] 142 mmol/L Normal     135-146    [facility]  
   
                                        Comment on above:   Performed By: #### 2  
, 303 ####  
[facility] [address], [address] [place] [address]  
Medical Director: James Montoya MD   
   
                                                    Urea nitrogen   
[Mass/Vol]      24 mg/dL        Normal          7-25            [facility]  
   
                                        Comment on above:   Performed By: #### 2  
23, 303 ####  
[facility] [address], [address] [place] [address]  
Medical Director: James Montoya MD   
   
                                                    HEMOGLOBIN A1con 01-   
   
                      HEMOGLOBIN A1c 6.7 % of total Hgb High       <5.7       [facility]  
   
                                        Comment on above:   Result Comment: For   
someone without known diabetes, a   
hemoglobin   
A1c  
value of 6.5% or greater indicates that they may have  
diabetes and this should be confirmed with a follow-up  
test.  
For someone with known diabetes, a value <7% indicates  
that their diabetes is well controlled and a value  
greater than or equal to 7% indicates suboptimal  
control. A1c targets should be individualized based on  
duration of diabetes, age, comorbid conditions, and  
other considerations.  
Currently, no consensus exists regarding use of  
hemoglobin A1c for diagnosis of diabetes for children.   
   
                                                            Performed By: #### 2  
, 303 ####  
[facility] [address], [address] [place] [address]  
Medical Director: James Montoya MD   
   
                                                    HEPATITIS C AB W/REFL TO HCV  
 RNA, QN, PCRon 01-   
   
                      HEPATITIS C ANTIBODY Non-Reactive Normal     NON-REACTIVE   
[facility]  
   
                                        Comment on above:   Result Comment:  
HCV antibody was non-reactive. There is no laboratory  
evidence of HCV infection.  
In most cases, no further action is required. However,  
if recent HCV exposure is suspected, a test for HCV RNA  
(test code 96145) is suggested.  
For additional information please refer to  
http://education.[employer]/faq/TQE45n4  
(This link is being provided for informational/  
educational purposes only.)   
   
                                                            Performed By: #### 2  
23, 303 ####  
[facility] [place]  
[address], [address] [place] [address]  
Medical Director: James Montoya MD   
   
                                                    LIPID PANEL, [facility]    
   
                                                    Cholesterol   
[Mass/Vol]      173 mg/dL       Normal          <200            [facility]  
   
                                        Comment on above:   Order Comment: FASTI  
NG:NOFASTING: NO   
   
                                                            Performed By: #### 2  
23, 303 ####  
[facility] [address], [address] [place] [address]  
Medical Director: James Montoya MD   
   
                                                    Cholesterol in HDL   
[Mass/Vol]      47 mg/dL        Normal          > OR = 40       [facility]  
   
                                        Comment on above:   Order Comment: FASTI  
NG:NOFASTING: NO   
   
                                                            Performed By: #### 2  
, 303 ####  
[facility] [address], [address] [place] [address]  
Medical Director: James Montoya MD   
   
                                                    Cholesterol in LDL   
[Mass/Vol]      100 mg/dL       High                            [facility]  
   
                                        Comment on above:   Order Comment: FASTI  
NG:NOFASTING: NO   
   
                                                            Result Comment: Refe  
rence range: <100  
Desirable range <100 mg/dL for primary prevention;  
<70 mg/dL for patients with CHD or diabetic patients  
with > or = 2 CHD risk factors.  
LDL-C is now calculated using the Cyrus-Liliane  
calculation, which is a validated novel method providing  
better accuracy than the Friedewald equation in the  
estimation of LDL-C.  
Cyrus PURI et al. AMINAH. 2013;310(19): 9003-3532  
(http://education.[employer]/faq/SMS528)   
   
                                                            Performed By: #### 2  
, 303 ####  
[facility] [address], [address] [place] [address]  
Medical Director: James Montoya MD   
   
                                                    Cholesterol.total/Ch  
olesterol in HDL   
[Mass ratio]    3.7 {ratio}     Normal          <5.0            [facility]  
   
                                        Comment on above:   Order Comment: FASTI  
NG:NOFASTING: NO   
   
                                                            Performed By: #### 2  
, 303 ####  
[facility] [address], [address] [place] [address]  
Medical Director: James Montoya MD   
   
                      NON HDL CHOLESTEROL 126 mg/dL (calc) Normal     <130        
 [facility]  
   
                                        Comment on above:   Order Comment: FASTI  
NG:NOFASTING: NO   
   
                                                            Result Comment: For   
patients with diabetes plus 1 major ASCVD   
risk  
factor, treating to a non-HDL-C goal of <100 mg/dL  
(LDL-C of <70 mg/dL) is considered a therapeutic  
option.   
   
                                                            Performed By: #### 2  
23, 303 ####  
[facility] [address], [address] [place] [address]  
Medical Director: James Montoya MD   
   
                                                    Triglyceride   
[Mass/Vol]      161 mg/dL       High            <150            [facility]  
   
                                        Comment on above:   Order Comment: FASTI  
NG:NOFASTING: NO   
   
                                                            Performed By: #### 2  
23, 303 ####  
[facility] [place] [address]  
Medical Director: James Montoya MD   
   
                                                    T4, FREEon 01-   
   
                      Free T4 [Mass/Vol] 1.9 ng/dL  High       0.8-1.8    [facility]  
   
                                        Comment on above:   Performed By: #### 3  
06, 93335, [address], 381, 622, 718, 8837,   
[address]   
####  
[facility] [place] [address]  
Medical Director: James Montoya MD  
#### [id number] ####  
[facility]  
[address]   
[place] [address]  
Medical Director: Yassine Rangel M.D.,PhD  
#### [id number] ####  
[facility],  
[address] [place] [id number]  
Medical Director: Eva King MD,PhD,JACKSON  
#### [id number] ####  
[facility]  
[address] [address], [address]  
[place] [address]  
Medical Director: Juan Pablo Saucedo MD,PhD   
   
                                                    TSH W/REFLEX TO FT4on 01-15-  
2025   
   
                      TSH W/REFLEX TO FT4 7.59 mIU/L High       0.40-4.50  [facility]  
   
                                        Comment on above:   Performed By: #### 2  
23, 303 ####  
[facility] [address], [address] [place] [address]  
Medical Director: James Montoya MD   
   
                                                    ALBUMINon 2024   
   
                      Albumin [Mass/Vol] 3.8 g/dL   Normal     3.6-5.1    [facility]  
   
                                        Comment on above:   Order Comment: FASTI  
NG:YES  
FASTING: YES   
   
                                                            Performed By: #### 2  
23, 303 ####  
[facility] [address], [address] [place] [address]  
Medical Director: James Montoya MD   
   
                                                    CALCIUMon 2024   
   
                      Calcium [Mass/Vol] 9.2 mg/dL  Normal     8.6-10.3   [facility]  
   
                                        Comment on above:   Performed By: #### 2  
23, 303 ####  
[facility] [address], [address] [place] [address]  
Medical Director: James Montoya MD   
   
                                                    COVID-19, MOLECULARon 2024   
   
                                                    SARS-CoV-2   
(COVID-19) Ab IA Ql Not detected    Normal          Not Detected    [facility]  
   
                                        Comment on above:   Result Comment: Test  
ing was performed using the Abbott ID NOW   
COVID-19 assay on the ID NOW platform.  
This test has not been approved for use in asymptomatic patients   
and its performance in this patient population has not been   
evaluated. Negative results do not rule out the presence of   
SARS-CoV-2/COVID-19.   
   
                                                    ED Prov Noteon 2024   
   
                                        ED Prov Note        HPI:  
2024,  
Time: [unfilled]  
Mihaela  Nigel is a 79   
y.o. male presenting to   
the ED for gradual onset   
of  
cough and congestion and   
difficulty breathing,   
beginning over the last   
week ago.  
The complaint has been   
persistent, moderate in   
severity, and worsened by  
nothing. No fever or   
chills. Denies chest pain   
and has history of COPD   
and CHF  
and lung cancer  
ROS:  
Pertinent positives and   
negatives are stated   
within HPI, all other   
systems  
reviewed and are   
negative.  
-------------------------  
-------------------- PAST   
HISTORY  
-------------------------  
--------------------  
Past Medical History:   
@[facility]@  
Past Surgical History:   
has a past surgical   
history that includes   
Lung removal,  
partial (Left, );   
stent rca (); Cabg   
(); cardio version;   
and Cardiac  
catheterization.  
Social History: reports   
that he has never smoked.   
He has never been exposed   
to  
tobacco smoke. He has   
never used smokeless   
tobacco. He reports that   
he does not  
use drugs.  
Family History: family   
history is not on file.  
The patient's home   
medications have been   
reviewed.  
Allergies: Amiodarone and   
Atorvastatin  
-------------------------  
-------------------------   
RESULTS  
-------------------------  
------------------------  
All laboratory and   
radiology results have   
been personally reviewed   
by myself  
LABS:  
Results for orders placed   
or performed during the   
hospital encounter of   
24  
POC CBC and Differential  
Collection Time: 24   
2:38 PM  
Result Value Ref Range  
WBC 11.52 (H) 4.50 -   
11.00 K/mcL  
RBC 5.18 4.50 - 5.90   
M/mcL  
Hemoglobin 15.2 13.5 -   
17.5 g/dL  
Hematocrit 45.8 41.0 -   
53.0 %  
MCV 88.4 80.0 - 100.0 fL  
MCH 29.3 26.0 - 34.0 pg  
MCHC 33.2 31.0 - 37.0   
g/dL  
RDW - CV 12.8 11.6 - 14.8   
%  
Platelets 259 150 - 400   
K/mcL  
MPV 8.7 (L) 9.4 - 12.4 fL  
Neutrophils 83.1 %  
Lymphocytes 6.3 %  
Monocytes 9.5 %  
Eosinophils 0.8 %  
Basophils 0.1 %  
IG Percent 0.20 %  
Neutrophils Abs 9.58 (H)   
1.70 - 7.00 K/mcL  
Lymphocytes Abs 0.72 (L)   
0.90 - 4.00 K/mcL  
Monocytes Abs 1.10 (H)   
0.30 - 0.90 K/mcL  
Eosinophils Abs 0.09 0.00   
- 0.50 K/mcL  
Basophils Abs 0.01 0.00 -   
0.30 K/mcL  
IG Absolute 0.02 0.00 -   
0.30 K/mcL  
POC Basic Metabolic Panel  
Collection Time: 24   
2:44 PM  
Result Value Ref Range  
Glucose 114 (H) 65 - 99   
mg/dL  
BUN 20 8 - 25 mg/dL  
Creatinine 0.87 0.80 -   
1.30 mg/dL  
GFR 88 >=60 mL/min/1.73   
m2  
Sodium 143 135 - 145   
mmol/L  
Potassium 3.9 3.5 - 5.1   
mmol/L  
Chloride 107 98 - 108   
mmol/L  
TCO2 27 21 - 32 mmol/L  
Ionized Calcium 4.5 4.5 -   
5.3 mg/dL  
COVID-19, Molecular  
Collection Time: 24   
2:46 PM  
Specimen: Swab  
Result Value Ref Range  
SARS-CoV-2 Not Detected   
Not Detected  
POC Influenza A/B  
Collection Time: 24   
3:01 PM  
Result Value Ref Range  
POC Rapid Influenza A Ag   
Not Detected Not Detected  
POC Influenza B Ag Not   
Detected Not Detected  
POC B-type natriuretic   
peptide (BNP)  
Collection Time: 24   
3:01 PM  
Result Value Ref Range  
 (H) <100 pg/mL  
POC Troponin I  
Collection Time: 24   
3:18 PM  
Result Value Ref Range  
Troponin I <0.05 <0.05   
ng/mL  
RADIOLOGY:  
Interpreted by   
Radiologist.  
XR Chest 1 View  
Final Result  
Stable examination with   
postsurgical changes in   
the left lung.  
Workstation ID: [id number]  
-------------------------   
NURSING NOTES AND VITALS   
REVIEWED  
-------------------------  
--  
The nursing notes within   
the ED encounter and   
vital signs as below have   
been  
reviewed.  
/77   Pulse 88     
Temp 97.1 degrees F (36.2   
degrees C)   Resp 16   Ht   
5' 11    Wt  
81.6 kg (180 lb)   SpO2   
94%   BMI 25.10 kg/m  
Oxygen Saturation   
Interpretation: Normal  
-------------------------  
-------------------------  
-PHYSICAL  
EXAM---------------------  
-----------------  
Constitutional/General:   
Alert and oriented x3,   
mildly ill appearing but   
no  
obvious respiratory   
distress, non toxic in   
NAD  
Head: NC/AT  
Eyes: PERRL, EOMI  
Mouth: Oropharynx clear,   
handling secretions, no   
trismus  
Neck: Supple, full ROM,   
no meningeal signs  
Pulmonary: Lungs   
inspiratory and   
expiratory wheezing and   
rhonchi not in  
respiratory distress  
Cardiovascular: Regular   
rate and rhythm, no   
murmurs, gallops, or   
rubs. 2+  
distal pulses  
Abdomen: Soft, non   
tender, non distended,  
Extremities: Moves all   
extremities x 4. Warm and   
well perfused  
Skin: warm and dry   
without rash  
Neurologic: GCS 15,  
Psych: Normal Affect  
-------------------------  
----- ED COURSE/MEDICAL   
DECISION  
MAKING-------------------  
---  
Medications  
sodium chloride (PF) (NS)   
flush 5 mL (has no   
administration in time   
range)  
And  
sodium chloride 0.9% (NS)   
(has no administration in   
time range)  
methylPREDNISolone sod   
suc(PF) (SOLU-medrol)   
Injection 125 mg (125 mg  
Intravenous Given   
24)  
ipratropium-albuteroL   
(DUO-NEB) 0.5-2.5 mg/3 ml   
nebulizer solution 6 mL   
(6 mL  
Nebulization Given   
24)  
Medical Decision Making:  
Workup unremarkable   
therefore will treat for   
bronchitis  
Counseling:  
The emergency provider   
has spoken with the   
patient and discus (more   
content not included)... Normal                                  [facility]  
   
                                                    POC B-TYPE NATRIURETIC PEPTI  
DE (BNP) - [facility] 2024   
   
                                                    Natriuretic peptide   
B (Bld) [Mass/Vol] 225 pg/mL       High            <100            [facility]  
   
                                                    POC BASIC METABOLIC PANEL -   
[facility] 2024   
   
                      Chloride [Moles/Vol] 107 mmol/L Normal          [facility]  
   
                                        Comment on above:   Order Comment: [facility] has implemented   
the   
eGFR calculation approach that does not have a coefficient for   
race that conforms to the NKF-ASN Task Force Recommendations.   
   
                      CO2 [Moles/Vol] 27 mmol/L  Normal     21-32      [facility]  
   
                                        Comment on above:   Order Comment: [facility] has implemented   
the   
eGFR calculation approach that does not have a coefficient for   
race that conforms to the NKF-ASN Task Force Recommendations.   
   
                                                    Creatinine   
[Mass/Vol]      0.87 mg/dL      Normal          0.80-1.30       [facility]  
   
                                        Comment on above:   Order Comment: [facility] has implemented   
the   
eGFR calculation approach that does not have a coefficient for   
race that conforms to the NKF-ASN Task Force Recommendations.   
   
                      Glucose [Mass/Vol] 114 mg/dL  High       65-99      [facility]  
   
                                        Comment on above:   Order Comment: [facility] has implemented   
the   
eGFR calculation approach that does not have a coefficient for   
race that conforms to the NKF-ASN Task Force Recommendations.   
   
                      POC GFR    88 mL/min/1.73 m2 Normal     >=60       [facility]  
   
                                        Comment on above:   Order Comment: [facility] has implemented   
the   
eGFR calculation approach that does not have a coefficient for   
race that conforms to the NKF-ASN Task Force Recommendations.   
   
                                                            Result Comment: Nancy  
mated GFR was calculated using the    
CKD-EPI creatinine equation.   
   
                      POC IONIZED CALCIUM 4.5 mg/dL  Normal     4.5-5.3    [facility]  
   
                                        Comment on above:   Order Comment: [facility] has implemented   
the   
eGFR calculation approach that does not have a coefficient for   
race that conforms to the NKF-ASN Task Force Recommendations.   
   
                                                    Potassium   
[Moles/Vol]     3.9 mmol/L      Normal          3.5-5.1         [facility]  
   
                                        Comment on above:   Order Comment: [facility] has implemented   
the   
eGFR calculation approach that does not have a coefficient for   
race that conforms to the NKF-ASN Task Force Recommendations.   
   
                      Sodium [Moles/Vol] 143 mmol/L Normal     135-145    [facility]  
   
                                        Comment on above:   Order Comment: [facility] has implemented   
the   
eGFR calculation approach that does not have a coefficient for   
race that conforms to the NKF-ASN Task Force Recommendations.   
   
                                                    Urea nitrogen   
[Mass/Vol]      20 mg/dL        Normal          8-25            [facility]  
   
                                        Comment on above:   Order Comment: [facility] has implemented   
the   
eGFR calculation approach that does not have a coefficient for   
race that conforms to the NKF-ASN Task Force Recommendations.   
   
                                                    POC CBC AND DIFFERENTIALon 2024   
   
                                                    BASOPHILS ABSOLUTE   
COUNT           0.01 K/mcL      Normal          0.00-0.30       [facility]  
   
                                                    Basophils/100 WBC   
(Bld)           0.1 %           Normal                          [facility]  
   
                                                    Eosinophils (Bld)   
[#/Vol]         0.09 10*3/uL    Normal          0.00-0.50       [facility]  
   
                                                    Eosinophils/100 WBC   
(Bld)           0.8 %           Normal                          [facility]  
   
                                                    Erythrocyte   
distribution width   
(RBC) [Ratio]   12.8 %          Normal          11.6-14.8       [facility]  
   
                                                    Hematocrit (Bld)   
[Volume fraction] 45.8 %          Normal          41.0-53.0       [facility]  
   
                                                    Hemoglobin (Bld)   
[Mass/Vol]      15.2 g/dL       Normal          13.5-17.5       [facility]  
   
                      IG ABSOLUTE 0.02 K/mcL Normal     0.00-0.30  [facility]  
   
                      IG PERCENT 0.20 %     Normal                [facility]  
   
                                        Comment on above:   Result Comment: The   
IG parameter is the percentage of   
metamyelocytes, myelocytes and promyelocytes. An immature   
granulocyte count (IG) of 1% or more suggests the possibility of   
infection, an IG count of 3% is very likely related to an   
infection.   
   
                                                    Lymphocytes (Bld)   
[#/Vol]         0.72 10*3/uL    Low             0.90-4.00       [facility]  
   
                                                    Lymphocytes/100 WBC   
(Bld)           6.3 %           Normal                          [facility]  
   
                                                    MCH (RBC) [Entitic   
mass]           29.3 pg         Normal          26.0-34.0       [facility]  
   
                                                    MCV (RBC) [Entitic   
vol]            88.4 fL         Normal          80.0-100.0      [facility]  
   
                                                    MEAN CORPUSCULAR   
HEMOGLOBIN CONC 33.2 g/dL       Normal          31.0-37.0       [facility]  
   
                                                    Monocytes (Bld)   
[#/Vol]         1.10 10*3/uL    High            0.30-0.90       [facility]  
   
                                                    Monocytes/100 WBC   
(Bld)           9.5 %           Normal                          [facility]  
   
                                                    NEUTROPHILS ABSOLUTE   
COUNT           9.58 K/mcL      High            1.70-7.00       [facility]  
   
                                                    Neutrophils/100 WBC   
(Bld)           83.1 %          Normal                          [facility]  
   
                                                    Platelet mean volume   
(Bld) [Entitic vol] 8.7 fL          Low             9.4-12.4        [facility]  
   
                                                    Platelets (Bld)   
[#/Vol]         259 10*3/uL     Normal          150-400         [facility]  
   
                      RBC (Bld) [#/Vol] 5.18 10*6/uL Normal     4.50-5.90  [facility]  
   
                      WBC (Bld) [#/Vol] 11.52 10*3/uL High       4.50-11.00 [facility]  
   
                                                    POC INFLUENZA A/B - [facility] 1  
2024   
   
                                                    POC INFLUENZA A   
(FSED)          Not detected    Normal          Not Detected    [facility]  
   
                                                    POC INFLUENZA B   
(FSED)          Not detected    Normal          Not Detected    [facility]  
   
                                                    POC TROPONIN I [facility] 2024   
   
                      POC TROPONIN I <          Normal     <0.05      [facility]  
   
                                                    XR CHEST PA/APon 2024   
   
                                        XR CHEST PA/AP      EXAMINATION:  
XR CHEST PA/AP 2024   
2:44 pm  
HISTORY:  
ORDERING SYSTEM PROVIDED   
HISTORY: Shortness of   
breath and cough,  
TECHNOLOGIST PROVIDED   
HISTORY:  
Illness/Other  
Reason for exam: sob and   
cough with increased   
difficulty breathing   
today  
hx of afib and CHF  
Cancer History: prostate  
Surgery,   
RadiationHistory: partial   
lung removal, CABG, back   
surgery  
Encounter Type: Initial  
Additional signs and   
symptoms: na  
ORDERING SYSTEM PROVIDED   
DIAGNOSIS CODES:  
COMPARISON:  
2008  
FINDINGS:  
Stable postsurgical   
changes involving the   
left lung with left   
apical  
opacity and volume loss   
in the left lung. The   
right lung is clear.  
Cardiomediastinal   
silhouette is   
unremarkable. Median   
sternotomy.  
Elevation of the left   
hemidiaphragm.  
IMPRESSION:  
Stable examination with   
postsurgical changes in   
the left lung.  
Workstation ID: [id number]  
Dictated by: JULIANO BLACKMON on [place] 2024   
3:06:31 PM EST  
Transcribed by: JULIANO BLACKMON on [place] 2024   
3:06:31 PM EST  
Finalized by: JULIANO BLACKMON on [place] 2024   
3:06:31 PM EST      Normal                                  [facility]  
   
                                        Comment on above:   Order Comment: Injur  
y/Trauma or Illness?:Illness/Other  
How long have you had these symptoms (acute/chronic)?:Acute  
Reason for exam?:sob and cough with increased difficulty   
breathing today hx of afib and CHF  
History of cancer?:prostate  
Surgeries, chemotherapy, or radiation?:partial lung removal,   
CABG, back surgery  
Type of Exam?:Initial  
Additional signs and symptoms?:na   
   
                                                    C TELOPEPTIDE (CTX)on 2024   
   
                      C TELOPEPTIDE (CTX) 93 pg/mL   Normal     see note   [facility]  
   
                                        Comment on above:   Result Comment:  
Unable to flag abnormal result(s), please refer  
to reference range(s) below:  
Reference Range, Males:  
<5 years: Not Established  
5-9 years: 574-1849 pg/mL  
10-13 years: 519-2415 pg/mL  
14-17 years: 435-2924 pg/mL  
18-29 years:  pg/mL  
30-39 years:  pg/mL  
40-49 years:  pg/mL  
50-68 years:  pg/mL  
>68 years: Not Established  
No reference range is provided for postmenopausal women  
because of the increased rate of bone turnover  
post-menopause. It is recommended that results for  
postmenopausal women be compared to the premenopausal  
reference range as this will give a better indication  
of their rate of bone loss.  
For additional information, please refer to  
https://education.[employer]/faq/RTF159  
(This link is being provided for informational/  
educational purposes only.)   
   
                                                            Performed By: #### 3  
06, 05623, 32424, 381, 622, 718, 8837, 35290   
####  
[facility]  
[address] [facility], [address] [place] [address]  
Medical Director: James Montoya MD  
#### [id number] ####  
[facility]  
[address]   
[place] [address]  
Medical Director: Yassine Rangel M.D.,PhD  
#### [id number] ####  
[facility]  
[address] [place] [address]  
Medical Director: Eva King MD,PhD,JACKSON  
#### [id number] ####  
[facility]  
[address] [address], [address]  
[place] [address]  
Medical Director: Juan Pablo Saucedo MD,PhD   
   
                                                    CALCIUM, 24 HOUR URINE (W/O   
CREATININE)on 2024   
   
                                                    CALCIUM, 24 HOUR   
URINE           64 mg/24 h      Normal                    [facility]  
   
                                        Comment on above:   Result Comment:  
Low Calcium Diet:  
 mg/24h   
   
                                                            Performed By: #### 3  
06, 26528, 38138, 381, 622, 718, 8837, 05197   
####  
[facility]  
[address] [facility], [address] [place], [place] [id number]  
Medical Director: James Montoya MD  
#### , [phone] ####  
[facility]/[address]   
[place]   
Medical Director: Yassine Rangel M.D.,PhD  
#### [id number] ####  
[facility]  
[address] [place] [address]  
Medical Director: Eva King MD,PhD,JACKSON  
#### [id number] ####  
[facility]  
[address] [place] [address], [address]  
[place] [address]  
Medical Director: Juan Pablo Saucedo MD,PhD   
   
                      TOTAL VOLUME 850 mL     Normal                [facility]  
   
                                        Comment on above:   Performed By: #### 3  
06, 35021, 57678, 381, 622, 718, 8837,   
[address]268   
####  
[facility]  
[address] [facility], [address] [place] [address]  
Medical Director: James Montoya MD  
#### [id number] ####  
[facility]   
[place]   
Medical Director: Yassine Rangel M.D.,PhD  
#### [id number] ####  
[facility]  
[address] [place] [address]  
Medical Director: Eva King MD,PhD,JACKSON  
#### [id number] ####  
[facility]  
[address] [place] [address], [address]  
[place] [address]  
Medical Director: Juan Pablo Saucedo MD,PhD   
   
                                                    CALCIUM, IONIZEDon   
4   
   
                      CALCIUM, IONIZED 5.0 mg/dL  Normal     4.7-5.5    [facility]  
   
                                        Comment on above:   Performed By: #### 2  
23, 303 ####  
[facility]  
[address], [address] [place] [address]  
Medical Director: James Montoya MD   
   
                                                    CELIAC DISEASE COMPREHENSIVE  
 PANELon 2024   
   
                      IMMUNOGLOBULIN A 232 mg/dL  Normal          [facility]  
   
                                        Comment on above:   Performed By: #### 3  
06, 38735, 09779, 381, 622, 718, 8837,   
56162   
####  
[facility]  
[address] [place] [address], [address]  
Medical Director: James Montoya MD  
#### [phone] [phone] ####  
[id number] [facility]   
[place] [address]  
Medical Director: Yassine Rangel M.D.,PhD  
#### [id number] ####  
[facility],  
[address] [place] [address]  
Medical Director: Eva King MD,PhD,JACKSON  
#### [id number] ####  
[facility]  
[address] [address], [address]  
[place] [address]  
Medical Director: Juan Pablo Saucedo MD,PhD   
   
                      INTERPRETATION see note   Normal                [facility]  
   
                                        Comment on above:   Result Comment: No s  
erological evidence of celiac disease.  
tTG IgA may normalize in individuals with celiac  
disease who maintain a gluten-free diet. Consider HLA  
DQ2 and DQ8 testing to rule out celiac disease. Celiac  
disease is extremely rare in the absence of DQ2 or DQ8.   
   
                                                            Performed By: #### 3  
06, 99381, 63985, 381, 622, 718, 8837, 72298   
####  
[facility] [facility]  
[address] [place] [address]  
Medical Director: James Montoya MD  
#### [id number] [phone] ####  
[facility]/[address]   
[place]   
Medical Director: Yassine Rangel M.D.,PhD  
#### [id number] ####  
[facility],  
[address] [place] [address]  
Medical Director: Eva King MD,PhD,JACKSON  
#### [id number] ####  
[facility]  
[address] [place] [address], [address]  
[place] [address]  
Medical Director: Juan Pablo Saucedo MD,PhD   
   
                                                    TISSUE   
TRANSGLUTAMINASE AB,   
IGA             <1.0            Normal          <15.0           [facility]  
   
                                        Comment on above:   Result Comment:  
Value Interpretation  
<15.0 Antibody not detected  
> or = 15.0 Antibody detected   
   
                                                            Performed By: #### 3  
06, 36187, 92830, 381, 622, 718, 8837, 37700   
####  
[facility] [address], [address] [place] [address]  
Medical Director: James Montoya MD  
#### [phone], [id number] ####  
[facility]/[address]   
[place]   
Medical Director: Yassine Rangel M.D.,PhD  
#### [id number] ####  
[facility],  
[address] [id number]  
Medical Director: Eva King MD,PhD,JACKSON  
#### [id number] ####  
[facility]  
[facility] [address], [address]  
[place] [address]  
Medical Director: Juan Pablo Saucedo MD,PhD   
   
                                                    COMPREHENSIVE METABOLIC PANE  
[facility] 2024   
   
                      Albumin [Mass/Vol] 4.0 g/dL   Normal     3.6-5.1    [facility]  
   
                                        Comment on above:   Performed By: #### 3  
06, 39620, 67946, 381, 622, 718, 8837,   
38570   
####  
[facility] [address], [address] [place] [address]  
Medical Director: James Montoya MD  
#### 66224, 07170 ####  
[facility]/[address]   
[place], [place]   
Medical Director: Yassine Rangel M.D.,PhD  
#### 75981 ####  
[facility],  
[address] [place] [address]  
Medical Director: Eva King MD,PhD,JACKSON  
#### 83250 ####  
[facility]  
[address] [facility] [address], [address]  
[place] [address]  
Medical Director: Juan Pablo Saucedo MD,PhD   
   
                                                    Albumin/Globulin   
[Mass ratio]    1.5 {ratio}     Normal          1.0-2.5         [facility]  
   
                                        Comment on above:   Performed By: #### 3  
06, 02914, 31993, 381, 622, 718, 8837,   
20871   
####  
[facility] of [address], [address] [place] [address]  
Medical Director: James Montoya MD  
#### 53596, 84323 ####  
[facility]/[id number] [facility]   
[place]   
Medical Director: Yassine Rangel M.D.,PhD  
#### 03130 ####  
[facility],  
[address] [place] [address]  
Medical Director: Eva King MD,PhD,JACKSON  
#### 84522 ####  
[facility]  
[address], [address]  
[place] [address]  
Medical Director: Juan Pablo Saucedo MD,PhD   
   
                                                    ALP [Catalytic   
activity/Vol]   60 U/L          Normal                    [facility]  
   
                                        Comment on above:   Performed By: #### 3  
06, 96139, 80986, 381, 622, 718, 8837,   
25699   
####  
[facility] of [place]  
Medical Director: James Montoya MD  
#### 63954, 42688 ####  
[facility]/[address]   
[place]   
Medical Director: Yassine Rangel M.D.,PhD  
#### 68953 ####  
[facility]/[address] [id number]  
Medical Director: Eva King MD,PhD,JACKSON  
#### 09563 ####  
[facility]  
[address] [place] [address], [address] [place] [address]  
Medical Director: Juan Pablo Saucedo MD,PhD   
   
                                                    ALT [Catalytic   
activity/Vol]   12 U/L          Normal          9-46            [facility]  
   
                                        Comment on above:   Performed By: #### 3  
06, 16689, 00036, 381, 622, 718, 8837,   
75707   
####  
[place] [address]  
Medical Director: James Montoya MD  
#### 97133, 82635 ####  
[facility]/[address]   
[place]   
Medical Director: Yassine Rangel M.D.,PhD  
#### 24732 ####  
[facility]/[address]   
Medical Director: Eva King MD,PhD,JACKSON  
#### 37561 ####  
[facility]  
[address] [place] [address], [address]  
[place] [address]  
Medical Director: Juan Pablo Saucedo MD,PhD   
   
                                                    AST [Catalytic   
activity/Vol]   14 U/L          Normal          10-35           [facility]  
   
                                        Comment on above:   Performed By: #### 3  
06, 12855, 27960, 381, 622, 718, 8837,   
72595   
####  
[facility] of [address], [address] [place] [address]  
Medical Director: James Montoya MD  
#### 65444, 94358 ####  
[facility]/[id number] [facility]   
[place]   
Medical Director: Yassine Rangel M.D.,PhD  
#### 60622 ####  
[facility],  
[address] [place] [address]  
Medical Director: Eva King MD,PhD,JACKSON  
#### 46123 ####  
[facility]  
[address] [facility], [address]  
[place] [address]  
Medical Director: Juan Pablo Saucedo MD,PhD   
   
                      Bilirubin [Mass/Vol] 0.6 mg/dL  Normal     0.2-1.2    [facility]  
t   
Diagnostics  
   
                                        Comment on above:   Performed By: #### 3  
06, 68654, 91547, 381, 622, 718, 8837,   
29858   
####  
[facility] [address], [address] [place] [address]  
Medical Director: James Montoya MD  
#### 13963, 16383 ####  
[facility]/[address] [facility]   
[place]   
Medical Director: Yassine Rangel M.D.,PhD  
#### 84929 ####  
[facility],  
[address] [place] [address]   
Medical Director: Eva King MD,PhD,JACKSON  
#### 62611 ####  
[facility]  
[address] [facility] [address], [address]  
[place] [address]  
Medical Director: Juan Pablo Saucedo MD,PhD   
   
                      BUN/CREATININE RATIO SEE NOTE:  Normal     6-22       Ques  
t   
Diagnostics  
   
                                        Comment on above:   Result Comment: Not   
Reported: BUN and Creatinine are within  
reference range.   
   
                                                            Performed By: #### 3  
06, 13433, 04190, 381, 622, 718, 8837, 77610   
####  
[facility] [address], [address] [place] [address]  
Medical Director: James Montoya MD  
#### 04625, 75150 ####  
[facility]/[address]   
[place]   
Medical Director: Yassine Rangel M.D.,PhD  
#### 17512 ####  
[facility]/[address] [address]  
Medical Director: Eva King MD,PhD,JACKSON  
#### 49595 ####  
[facility]  
[address] [place] [address], [address]  
[place] [address]  
Medical Director: Juan Pablo Saucedo MD,PhD   
   
                      Chloride [Moles/Vol] 104 mmol/L Normal          Ques  
t   
Diagnostics  
   
                                        Comment on above:   Performed By: #### 3  
06, 37020, 73188, 381, 622, 718, 1237,   
64894   
####  
[facility] [address], [address] [place], [place] [id number]  
Medical Director: James Montoya MD  
#### 01019, 22938 ####  
[facility]/[address]   
[place]   
Medical Director: Yassine Rangel M.D.,PhD  
#### 29994 ####  
[facility],  
[address] [place] [address]  
Medical Director: Eva King MD,PhD,JACKSON  
#### 95199 ####  
[facility]  
[facility] [address], [address]  
[place] [address]  
Medical Director: Juan Pablo Saucedo MD,PhD   
   
                      CO2 [Moles/Vol] 28 mmol/L  Normal     20-32      [facility]  
   
                                        Comment on above:   Performed By: #### 3  
06, 56693, 14347, 381, 622, 718, 8837,   
62323   
####  
[facility] [address], [address] [place], [place] [address]  
Medical Director: James Montoya MD  
#### 68825, 66783 ####  
[facility]/[address]   
[place]   
Medical Director: Yassine Rangel M.D.,PhD  
#### 17657 ####  
[facility]  
[address] [place] [address]  
Medical Director: Eva King MD,PhD,JACKSON  
#### 66405 ####  
[facility]  
[address] [place] [address], [address]  
[place] [address]  
Medical Director: Juan Pablo Saucedo MD,PhD   
   
                                                    Creatinine   
[Mass/Vol]      1.02 mg/dL      Normal          0.70-1.28       [facility]  
   
                                        Comment on above:   Performed By: #### 3  
06, 48311, 19226, 381, 622, 718, 8837,   
05616   
####  
[facility] [address], [address] [place] [address]  
Medical Director: James Montoya MD  
#### 69415, 75610 ####  
[facility]/[address] [facility]   
[place]   
Medical Director: Yassine Rangel M.D.,PhD  
#### 49363 ####  
[facility],  
[address] [place] [address]  
Medical Director: Eva King MD,PhD,JACKSON  
#### 18699 ####  
[facility]  
[address] [place] [address], [address]  
[place] [address]  
Medical Director: Juan Pablo Saucedo MD,PhD   
   
                                                    GFR/1.73 sq   
M.predicted among   
non-blacks MDRD   
(S/P/Bld) [Vol   
rate/Area]      75 mL/min/{1.73_m2} Normal          > OR = 60       [facility]  
   
                                        Comment on above:   Performed By: #### 3  
06, 62171, 69421, 381, 622, 718, 8837,   
67030   
####  
[facility] [address] [address]  
Medical Director: James Montoya MD  
#### 64258, 77311 ####  
[facility]/[address]   
[place]   
Medical Director: Yassine Rangel M.D.,PhD  
#### 15952 ####  
[facility],  
[address] [place] [address]  
Medical Director: Eva King MD,PhD,JACKSON  
#### 39870 ####  
[facility]  
[address] [place] [address], [address] [place] [address]  
Medical Director: Juan Pablo Saucedo MD,PhD   
   
                                                    Globulin (S)   
[Mass/Vol]      2.6 g/dL        Normal          1.9-3.7         [facility]  
   
                                        Comment on above:   Performed By: #### 3  
06, 02242, 11932, 381, 622, 718, 8837,   
72673   
####  
[facility] of [address] [address]  
Medical Director: James Montoya MD  
#### 99018, 14327 ####  
[facility]/[address]   
[place]   
Medical Director: Yassine Rangel M.D.,PhD  
#### 60895 ####  
[facility],  
[address] [place] [address]  
Medical Director: Eva King MD,PhD,JACKSON  
#### 53160 ####  
[facility]  
[facility] [address], [address]  
[place] [address]  
Medical Director: Juan Pablo Saucedo MD,PhD   
   
                      Glucose [Mass/Vol] 107 mg/dL  High       65-99      [facility]  
   
                                        Comment on above:   Result Comment:  
Fasting reference interval  
For someone without known diabetes, a glucose value  
between 100 and 125 mg/dL is consistent with  
prediabetes and should be confirmed with a  
follow-up test.   
   
                                                            Performed By: #### 3  
06, 55618, 90887, 381, 622, 718, 8837, 38630   
####  
[facility]  
[address] [place] [address], [address] [place] [address]  
Medical Director: James Montoya MD  
#### 46626, 50995 ####  
[facility]/[address] [facility]   
[place]   
Medical Director: Yassine Rangel M.D.,PhD  
#### 74321 ####  
[facility],  
[address] [place] [address]  
Medical Director: Eva King MD,PhD,JACKSON  
#### 07708 ####  
[facility]  
[address] [place] [address], [address]  
[place] [address]  
Medical Director: Juan Pablo Saucedo MD,PhD   
   
                                                    Potassium   
[Moles/Vol]     4.0 mmol/L      Normal          3.5-5.3         [facility]  
   
                                        Comment on above:   Performed By: #### 3  
06, 46111, 63411, 381, 622, 718, 8837,   
57040   
####  
[facility] [address], [address] [place] [address]  
Medical Director: James Montoya MD  
#### 94476, 38593 ####  
[facility]/[address] [facility]   
[place]   
Medical Director: Yassine Rangel M.D.,PhD  
#### 06066 ####  
[facility],  
[address] [place] [address]  
Medical Director: Eva King MD,PhD,JACKSON  
#### 32235 ####  
[facility]  
[address] [address], [address]  
[place] [address]  
Medical Director: Juan Pablo Saucedo MD,PhD   
   
                      Protein [Mass/Vol] 6.6 g/dL   Normal     6.1-8.1    [facility]  
   
                                        Comment on above:   Performed By: #### 3  
06, 49968, 31513, 381, 622, 718, 8837,   
04775   
####  
[facility] [address], [address] [id number]  
Medical Director: James Montoya MD  
#### 76001, 78847 ####  
[facility]/[address] [facility] [address] [place]   
Medical Director: Yassine Rangel M.D.,PhD  
#### 80641 ####  
[facility],  
[address] [place] [address]  
Medical Director: Eva King MD,PhD,JACKSON  
#### 26221 ####  
[facility]  
[address] [address], [address]  
[place] [address]  
Medical Director: Juan Pablo Saucedo MD,PhD   
   
                      Sodium [Moles/Vol] 141 mmol/L Normal     135-146    [facility]  
   
                                        Comment on above:   Performed By: #### 3  
06, 28905, 35075, 381, 622, 718, 8837,   
38997   
####  
[facility] [address], [address] [place] [address]  
Medical Director: James Montoya MD  
#### 76072, 29049 ####  
[facility]  
[address] [facility]   
[place]   
Medical Director: Yassine Rangel M.D.,PhD  
#### 89305 ####  
[facility],  
[address] [place]   
Medical Director: Eva iKng MD,PhD,JACKSON  
#### 66401 ####  
[facility]  
[address] [facility] [address], [address]  
[place] [address]  
Medical Director: Juan Pablo Saucedo MD,PhD   
   
                                                    Urea nitrogen   
[Mass/Vol]      23 mg/dL        Normal          7-25            [facility]  
   
                                        Comment on above:   Performed By: #### 3  
06, 64994, 00331, 381, 622, 718, 8837,   
80991   
####  
[facility]  
[address] [place] [address], [address] [id number]  
Medical Director: James Montoya MD  
#### ,  ####  
[facility]  
[address] [facility]   
[place]   
Medical Director: Yassine Rangel M.D.,PhD  
#### 94747 ####  
[facility],  
[address] [place]   
Medical Director: Eva King MD,PhD,JACKSON  
#### 93994 ####  
[facility]  
[address], [address]  
[place] [address]  
Medical Director: Juan Pablo Saucedo MD,PhD   
   
                                                    CREATININE, 24 HOUR URINEon   
2024   
   
                                                    CREATININE, 24 HOUR   
URINE           1.00 g/24 h     Normal          0.50-2.15       [facility]  
   
                                        Comment on above:   Result Comment: URIN  
E VOLUME: 850/24   
   
                                                            Performed By: #### 3  
06, 10297, 67203, 381, 622, 718, 8837, 55771   
####  
[facility] of [facility]  
[address] [place] [address], [address] [place] [address]  
Medical Director: James Montoya MD  
#### ,  ####  
[facility]/[place] [address] [facility] [address] [place]   
Medical Director: Yassine Rangel M.D.,PhD  
#### 56349 ####  
[facility],  
[address] [place] [address]  
Medical Director: Eva King MD,PhD,JACKSON  
#### 48169 ####  
[facility]  
[address] [address], [address]  
[place] [address]  
Medical Director: Juan Pablo Saucedo MD,PhD   
   
                                                    KAPPA/LAMBDA LIGHT CHAINS FR  
EE WITH RATIO, SERUMon 2024   
   
                                                    KAPPA LIGHT CHAIN,   
FREE, SERUM     21.9 mg/L       High            3.3-19.4        [facility]  
   
                                        Comment on above:   Performed By: #### 2  
23, 303 ####  
[facility] [address], [address] [place] [address]  
Medical Director: James Montoya MD   
   
                                                    KAPPA/LAMBDA LIGHT   
CHAINS FREE WITH   
RATIO, SERUM    1.56            Normal          0.26-1.65       [facility]  
   
                                        Comment on above:   Result Comment: Free  
 kappa/lambda ratio in serum of normal   
individuals  
is 0.26-1.65. Excess production of free kappa or lambda  
chains can alter this ratio. Monoclonal free light  
chains are found in serum of patients with multiple  
myeloma, Waldenstrom's macroglobulinemia, mu-heavy chain  
disease, primary amyloidosis, light chain deposition  
disease, monoclonal gammopathy of undetermined  
significance, and lymphoproliferative disorders.  
Measurement of free light chain concentration in serum  
is useful for diagnosis, prognosis, monitoring disease  
activity and following response to therapy of these  
disorders.   
   
                                                            Performed By: #### 2  
23, 303 ####  
[facility] [address] [address]  
Medical Director: James Montoya MD   
   
                                                    LAMBDA LIGHT CHAIN,   
FREE, SERUM     14.0 mg/L       Normal          5.7-26.3        [facility]  
   
                                        Comment on above:   Performed By: #### 2  
, 303 ####  
[facility] [address] [address]  
Medical Director: James Montoya MD   
   
                                                    MAGNESIUMon 2024   
   
                      Magnesium [Mass/Vol] 2.2 mg/dL  Normal     1.5-2.5    Ques  
t   
Diagnostics  
   
                                        Comment on above:   Performed By: #### 3  
06, 41061, 42228, 381, 622, 718, 8837,   
55437   
####  
[facility] [address] [place] [address]  
Medical Director: James Montoya MD  
#### 20194, 85359 ####  
[facility]/[address]   
[place]   
Medical Director: Yassine Rangel M.D.,PhD  
#### 58527 ####  
[facility],  
[address] [place] [address]   
Medical Director: Eva King MD,PhD,JACKSON  
#### 76617 ####  
[facility]  
[address] [place] [address], [address]  
[place] [address]  
Medical Director: Juan Pablo Saucedo MD,PhD   
   
                                                    PHOSPHATE (AS PHOSPHORUS)on   
2024   
   
                      Phosphate [Mass/Vol] 3.4 mg/dL  Normal     2.1-4.3    Ques  
t   
Diagnostics  
   
                                        Comment on above:   Performed By: #### 3  
06, 48641, 62003, 381, 622, 718, 8837,   
93293   
####  
[facility]  
[address] [facility], [address] [place] [address]  
Medical Director: James Montoya MD  
#### 29146, 80760 ####  
[facility]/[address] [facility]   
[place]   
Medical Director: Yassine Rangel M.D.,PhD  
#### 05164 ####  
[facility],  
[address] [place] [address]   
Medical Director: Eva King MD,PhD,JACKSON  
#### 60499 ####  
[facility]  
[address] [place] [address], [address]  
[place] [address]  
Medical Director: Juan Pablo Saucedo MD,PhD   
   
                                                    PTH, INTACT AND CALCIUMon    
   
                      Calcium [Mass/Vol] 8.9 mg/dL  Normal     8.6-10.3   [facility]  
   
                                        Comment on above:   Order Comment: FASTI  
NG:YES  
FASTING: YES   
   
                                                            Performed By: #### 3  
06, 04383, 29794, 381, 622, 718, 8837, 80906   
####  
[facility] [address], [address] [place] [address]  
Medical Director: James Montoya MD  
#### 49532, 52361 ####  
[facility]  
[address] [place]   
Medical Director: Yassine Rangel M.D.,PhD  
#### 69782 ####  
[facility],  
[address] [facility] [place] [address]  
Medical Director: Eva King MD,PhD,JACKSON  
#### 25631 ####  
[facility]  
[address] [address], [address]  
[place] [address]  
Medical Director: Juan Pablo Saucedo MD,PhD   
   
                                                    PARATHYROID HORMONE,   
INTACT          52 pg/mL        Normal          16-77           [facility]  
   
                                        Comment on above:   Order Comment: FASTI  
NG:YES  
FASTING: YES   
   
                                                            Result Comment:  
Interpretive Guide Intact PTH Calcium  
------------------ ---------- -------  
Normal Parathyroid Normal Normal  
Hypoparathyroidism Low or Low Normal Low  
Hyperparathyroidism  
Primary Normal or High High  
Secondary High Normal or Low  
Tertiary High High  
Non-Parathyroid  
Hypercalcemia Low or Low Normal High   
   
                                                            Performed By: #### 3  
06, 95114, 82529, 381, 622, 718, 8837, 75484   
####  
[facility] [address], [address] [place] [address]  
Medical Director: James Montoya MD  
#### 81863, 29910 ####  
[facility]  
 [facility] [address] [place]   
Medical Director: Yassine Rangel M.D.,PhD  
#### 24278 ####  
[facility],  
[address] [facility] [place] [address]  
Medical Director: Eva King MD,PhD,JACKSON  
#### 92482 ####  
[facility]  
[address] [facility] [address], [address]  
[place] [address]  
Medical Director: Juan Pablo Saucedo MD,PhD   
   
                                                    SODIUM W/O CREATININE, 24 HO  
UR URINEon 2024   
   
                                                    SODIUM, 24 HOUR   
URINE           135 mmol/24 h   Normal                    [facility]  
   
                                        Comment on above:   Result Comment: MDF  
[facility]  
[address] [address],[address]  
[place] [address]  
[phone]  
Juan Pablo Saucedo MD, PhD   
   
                                                            Performed By: #### 2  
23, 303 ####  
[facility] [address], [address] [place] [address]  
Medical Director: James Montoya MD   
   
                                                    VITAMIN D,25-OH,TOTAL,IAon 1  
2024   
   
                                                    VITAMIN   
D,25-OH,TOTAL,IA 33 ng/mL        Normal                    [facility]  
   
                                        Comment on above:   Result Comment: Ashley  
min D Status 25-OH Vitamin D:  
Deficiency: <20 ng/mL  
Insufficiency: 20 - 29 ng/mL  
Optimal: > or = 30 ng/mL  
For 25-OH Vitamin D testing on patients on  
D2-supplementation and patients for whom quantitation  
of D2 and D3 fractions is required, the [facility])  
25-OH VIT D, (D2,D3), LC/MS/MS is recommended: order  
code 63839 (patients >2yrs).  
See Note 1  
Note 1  
For additional information, please refer to  
[email or web address]/[email or web address]  
(This link is being provided for informational/  
educational purposes only.)   
   
                                                            Performed By: #### 2  
23, 303 ####  
[facility] [facility] [address], [address] [place] [id number]  
Medical Director: James Montoya MD   
   
                                                    IMMUNOFIXATION, SERUMon    
   
                                                    IMMUNOFIXATION,   
SERUM           Normal          Normal          Normal          [facility]  
   
                                        Comment on above:   Performed By: #### 4  
6015 ####  
[facility]  
[address] [place] [address]  
Asad Avila M.D.  
[id number]   
   
                                        SIFIX INTERPRETATION No paraproteins det  
ected.   
A negative serum   
immunofixation and/or   
serum protein   
electrophoresis is   
insufficient to rule out   
a monoclonal protein.   
Recommend serum free   
light chains if   
clinically indicated.   
Reviewed by Pathologist:   
MAR Echeverria MD.         Normal                                  [facility]  
   
                                        Comment on above:   Performed By: #### 4  
6015 ####  
[facility]  
[address] [place] [address]  
Asad Avila M.D.  
[id number]   
   
                                                    PROTEIN ELECTROPHORESIS, [facility]  
ODon 2024   
   
                      Albumin [Mass/Vol] 3.3 g/dL   Normal     3.1-5.5    [facility]  
   
                                        Comment on above:   Performed By: #### 4  
6379 ####  
[facility]  
[address] [place] [address]  
Asad Avila M.D.  
[id number]   
   
                      ALPHA 1    0.3 g/dL   Normal     0.2-0.5    [facility]  
   
                                        Comment on above:   Performed By: #### 4  
6379 ####  
[facility]  
[address] [place] [address]  
Asad Avila M.D.  
[id number]   
   
                      ALPHA 2    0.8 g/dL   Normal     0.4-1.1    [facility]  
   
                                        Comment on above:   Performed By: #### 4  
6379 ####  
[facility]  
[address] [place] [address]  
Asad Avila M.D.  
[id number]   
   
                      BETA GLOBULIN 1.1 g/dL   Normal     0.6-1.3    [facility]  
   
                                        Comment on above:   Performed By: #### 4  
6379 ####  
[facility]  
[address] [place] [address]  
Asad Avila M.D.  
[id number]   
   
                      GAMMA GLOBULIN 1.0 g/dL   Normal     0.6-1.8    [facility]  
   
                                        Comment on above:   Performed By: #### 4  
6369 ####  
[facility]  
[address] [place] [address]  
Asad Avila M.D.  
[id number]   
   
                      Protein [Mass/Vol] 6.5 g/dL   Normal     6.0-8.0    [facility]  
   
                                        Comment on above:   Performed By: #### 4  
6379 ####  
[facility]  
[address] [place] [id number]  
Asad Avila M.D.  
[id number]   
   
                                        REVIEWED BY         Reviewed by Patholog  
ist:   
MAR Echeverria MD          [facility]  
   
                                        Comment on above:   Performed By: #### 4  
6379 ####  
[facility]  
[address] [place] [id number]  
Asad Avila M.D.  
[id number]   
   
                                        SPEP INTERPRETATION Normal pattern. Seru  
m   
protein electrophoresis   
is insufficient to rule   
out a monoclonal protein.   
Recommend serum   
immunofixation and serum   
free light chains if   
clinically indicated. [facility]  
   
                                        Comment on above:   Performed By: #### 4  
6379 ####  
[facility]  
[address] [place], [place] [id number]  
Asad Avila M.D.  
[id number]   
   
                                                    XR LUMBAR SPINE 2-3 VIEWS (S  
TANDARD)on 2024   
   
                                                    XR LUMBAR SPINE 2-3   
VIEWS (STANDARD)                        EXAMINATION:  
XR LUMBAR SPINE 2-3 VIEWS   
(STANDARD)  
HISTORY:  
osteoporosis, history of   
vertebral fractures,   
evaluate for compression  
fractures, compare to   
previous studies if   
present  
COMPARISON:  
None.  
IMPRESSION:  
FINDINGS/  
1. No acute fracture or   
dislocation.  
2. Vertebroplasty at the   
L1 and L2 levels.  
3. Atherosclerotic   
calcification of the   
abdominal aorta.  
4. Normal alignment of   
the sacrum and coccyx.  
5. Overlying bowel gas   
pattern is nonspecific.  
6. Mild degeneration of   
the sacroiliac joints.  
Workstation ID: [id number]  
Dictated by: MUSA FLORES on    
4:25:34 PM EDT  
Transcribed by: MUSA FLORES on  4:25:34 PM EDT  
Finalized by: MUSA FLORES on  4:25:34 PM EDT [facility]  
   
                                        Comment on above:   Order Comment: Injur  
y/Trauma or Illness?:Illness/Other  
How long have you had these symptoms (acute/chronic)?:Acute  
Reason for exam?:sob and cough with increased difficulty   
breathing today hx of afib and CHF  
History of cancer?:prostate  
Surgeries, chemotherapy, or radiation?:partial lung removal,   
CABG, back surgery  
Type of Exam?:Initial  
Additional signs and symptoms?:na   
   
                                                    XR THORACIC SPINE 2 VIEWSon   
2024   
   
                                                    XR THORACIC SPINE 2   
VIEWS                                   EXAMINATION:  
XR THORACIC SPINE 2 VIEWS  
HISTORY:  
ORDERING SYSTEM PROVIDED   
HISTORY: osteoporosis,   
history of vertebral  
fractures, evaluate for   
compression fractures,   
compare to previous   
studies  
if present,  
TECHNOLOGIST PROVIDED   
HISTORY:  
Illness/Other  
Reason for exam:   
osteoporosis, history of   
vertebral fractures,   
evaluate  
for compression   
fractures, compare to   
previous studies if   
present  
Cancer History: prostate  
Surgery,   
RadiationHistory: partial   
lung removal, CABG, back   
surgery  
Encounter Type: Initial  
Additional signs and   
symptoms: none  
ORDERING SYSTEM PROVIDED   
DIAGNOSIS CODES:  
M81.0 Osteoporosis,   
unspecified osteoporosis   
type, unspecified  
pathological fracture   
presence  
COMPARISON:  
Chest x-ray: 2018, CT   
chest: 2018  
FINDINGS:  
Two views of the thoracic   
spine.  
There is vertebroplasty   
cement at L1 and L2.   
There is moderate   
anterior  
compression deformity at   
T9.. There is mild to   
moderate compression  
deformity of T4 and T6.   
there is central   
compression the superior  
endplates of T11 and T12.   
These fractures are all   
stable from 2018.  
There is no listhesis. No   
abnormal curvature.   
Intervertebral disc   
spaces  
are maintained.  
There are median   
sternotomy wires and   
mediastinal clips. There   
is chronic  
pleural and parenchymal   
scarring in the left lung   
apex.  
IMPRESSION:  
1. Multiple vertebral   
compression deformities   
are stable from a CT   
chest  
dated 2018.  
2. There is no new   
fracture.  
3. There is moderate to   
severe chronic pleural   
and parenchymal scarring   
in  
the left lung apex.  
Workstation ID: [id number]  
Dictated by: BHAVIK BADILLO on  7:51:28 AM EST  
Transcribed by:   
BHAVIK BADILLO on   
 7:51:28   
AM EST  
Finalized by: BHAVIK BADILLO on  7:51:28 AM EST [facility]  
   
                                        Comment on above:   Order Comment: Injur  
y/Trauma or Illness?:Illness/Other  
How long have you had these symptoms (acute/chronic)?:Chronic  
Reason for exam?:osteoporosis, history of vertebral fractures,   
evaluate for compression fractures, compare to previous studies   
if present  
History of cancer?:prostate  
Surgeries, chemotherapy, or radiation?:partial lung removal,   
CABG, back surgery  
Type of Exam?:Initial  
Additional signs and symptoms?:none   
   
                                                    CT ANGIOGRAM HEAD NECKon    
   
                                                    CT ANGIOGRAM HEAD   
NECK                                    EXAMINATION:  
CT HEAD WITHOUT CONTRAST;   
CT ANGIOGRAM HEAD NECK  
HISTORY:  
Right Neck pain,   
occipital headache,   
concern for dissection.  
COMPARISON:  
CTA chest from   
2018.  
TECHNIQUE:  
CT angiogram of the head   
and neck was performed.   
Coronal, sagittal and 3-D  
reformats were created   
and reviewed. Carotid   
stenosis measurements   
were  
made according to the   
NASCET criteria.  
Dose reduction techniques   
were achieved by using   
automated exposure  
control and/or adjustment   
of mA and/or kV according   
to patient size and/or  
use of iterative   
reconstruction technique  
CONTRAST:  
The amount and type of   
contrast are recorded in   
the medical record.  
FINDINGS:  
CT HEAD: No acute   
intracranial hemorrhage,   
or extra-axial fluid  
collection, midline shift   
or mass effect is seen.   
No space-occupying  
lesion is demonstrated.   
Sulcal and ventricular   
prominence is compatible  
with intracranial volume   
loss.  
Areas of diminished   
attenuation are seen   
within the hemispheric   
white  
matter. While   
nonspecific, they likely   
reflect chronic   
microvascular  
ischemic change.  
No CT evidence of an   
acute ischemic event.   
There are scattered  
intracranial vascular   
calcifications.  
Mucosal thickening is   
seen within the paranasal   
sinuses. The mastoids are  
well aerated.  
CTA NECK: There is   
atherosclerotic changes   
with plaque and   
calcification  
along the course of the   
aortic arch. No   
flow-limiting stenosis at   
the  
origin of the great   
vessels.  
Atherosclerotic change is   
also seen to involve the   
subclavian arteries.  
There is a superimposed   
ulcerated plaque to along   
the course of the  
proximal left subclavian   
artery. The however, the   
subclavian and axillary  
arteries are not   
significantly stenotic.  
Atherosclerotic change   
involving the cervical   
carotid arteries is also  
noted. There is a   
high-grade stenosis at   
the origin of the right   
common  
carotid artery. No other   
region of significant   
extracranial carotid  
narrowing, dissection or   
evidence of carotid   
occlusion.  
The left vertebral artery   
is dominant with the   
right vertebral artery  
smaller in caliber.   
Severe stenosis at the   
origin of the non   
dominant  
right vertebral artery is   
noted. The vertebral   
arteries are otherwise  
patent throughout their   
course within the neck.  
Changes related to   
partial pneumonectomy   
with associated post   
treatment  
fibrosis and pleural   
thickening within the   
visualized the chest are  
redemonstrated.  
CTA HEAD: There are   
vascular calcifications   
along the course of the  
carotid siphon. No flow   
limiting intracranial   
arterial stenosis or  
evidence of large vessel   
occlusion. There is no   
evidence of an  
intracranial aneurysm.  
The A1 segment of the   
right anterior cerebral   
artery is hypoplastic and  
smaller than its   
counterpart on the left.   
This finding reflects  
developmental vascular   
variation.  
The dural sinuses are   
opacified with   
intravascular contrast   
material.  
IMPRESSION:  
1. No CT evidence of an   
acute intracranial   
hemorrhage or acute   
ischemic  
event.  
2. Intracranial volume   
loss and probable   
superimposed chronic  
microvascular ischemic   
change.  
3. Intra and extracranial   
atherosclerotic change.  
4. There is a high-grade   
stenosis at the origin of   
the right common  
carotid artery.  
5. A severe stenosis at   
the origin of the non   
dominant right vertebral  
artery.  
6. No other flow-limiting   
arterial stenosis or   
evidence of dissection  
within the neck.  
7. No evidence of an   
intracranial large vessel   
occlusion.  
Workstation ID: [id number]  
Dictated by: WILBER CANTU on  3:28:08 AM EDT  
Transcribed by: WILBER CNATU on  3:28:08 AM EDT  
Finalized by: WILBER CANTU on  3:28:08 AM EDT [facility]  
   
                                        Comment on above:   Order Comment: Injur  
y/Trauma or Illness?:Illness/Other  
How long have you had these symptoms (acute/chronic)?:Acute  
Reason for exam?:sob and cough with increased difficulty   
breathing today hx of afib and CHF  
History of cancer?:prostate  
Surgeries, chemotherapy, or radiation?:partial lung removal,   
CABG, back surgery  
Type of Exam?:Initial  
Additional signs and symptoms?:na   
   
                                                    CT CERVICAL SPINE RECONSTRUC  
[facility] 2024   
   
                                                    CT CERVICAL SPINE   
RECONSTRUCTED                           EXAMINATION:  
CT CERVICAL SPINE WITH   
CONTRAST - RECONSTRUCTED  
HISTORY:  
Right neck pain  
Injury/Trauma or   
Illness?:Illness/Other  
How long have you had   
these symptoms   
(acute/chronic)?:Acute  
Reason for exam?:Pt   
presents with right neck   
pain for 24 hours. Pt   
denies  
injury. Pt pain is worse   
with movement  
Type of Exam?:Initial  
Additional signs and   
symptoms?:Pt presents   
with right neck pain for   
24  
hours. Pt denies injury.   
Pt pain is worse with   
movement  
Injury/Trauma or   
Illness?:Illness/Other  
How long have you had   
these symptoms   
(acute/chronic)?:AcuteRig  
ht neck pain  
COMPARISON:  
None.  
TECHNIQUE:  
Axial, sagittal and   
coronal images of the   
cervical spine were   
created from  
the source data obtained   
at the time of the CTA   
neck with intravenous  
contrast.  
Dose reduction techniques   
were achieved by using   
automated exposure  
control and/or adjustment   
of mA and/or kV according   
to patient size and/or  
use of iterative   
reconstruction technique.  
COMMENT: Absence of   
intradural contrast and   
artifact from bone about   
the  
vertebral column limit   
assessment for disc   
protrusion, bulge and the  
spinal canal in general.  
CONTRAST:  
The amount and type of   
contrast are listed in   
the medical record.  
FINDINGS:  
The bones are osteopenic.   
There is loss of the   
normal lordosis and  
straightening of the   
cervical vertebral   
column.  
No CT evidence of an   
acute fracture or   
significant loss of   
vertebral body  
height. A grade 1   
degenerative   
anterolisthesis of C 4 on   
C5 is noted.  
There is multilevel loss   
of disc space and   
endplate osteophyte   
formation.  
No malalignment at the   
craniocervical junction.  
While assessment is again   
suboptimal on this   
noncontrast CT, there are  
areas of canal narrowing   
due to disc bulging,   
endplate osteophyte and   
the  
previously noted   
subluxation. There also   
appears to be a small   
posterior  
midline disc extrusion at   
C4-C5.  
Uncovertebral spurring   
and hypertrophic change   
of the facet joints also  
results in areas of   
foraminal stenosis.  
A pleural effusion is   
noted on the left. The   
carotid and vertebral  
arteries are opacified   
with intravascular   
contrast.  
IMPRESSION:  
1. Osteopenia and   
degenerative change of   
the cervical vertebral   
column.  
2. No CT evidence of an   
acute fracture.  
3. Pleural effusion on   
the left.  
4. If there is further   
clinical indication to   
evaluate the spinal   
canal,  
cord, ligamentous injury   
or discogenic causes of   
the patient's symptoms  
consider MRI as it would   
be more sensitive.  
Workstation ID: [id number]  
Dictated by: WILBER CANTU on  2:56:56 AM EDT  
Transcribed by: WILBER CANTU on  2:56:56 AM EDT  
Finalized by: WILBER CANTU on  2:56:56 AM EDT [facility]  
   
                                        Comment on above:   Order Comment: Injur  
y/Trauma or Illness?:Illness/Other  
How long have you had these symptoms (acute/chronic)?:Acute  
Reason for exam?:Pt presents with right neck pain for 24 hours.   
Pt denies injury. Pt pain is worse with movement  
Type of Exam?:Initial  
Additional signs and symptoms?:Pt presents with right neck pain   
for 24 hours. Pt denies injury. Pt pain is worse with movement   
   
                                                    ED Prov Noteon 2024   
   
                                        ED Prov Note        PCP - Jake Jones MD  
MRN - [id number]  
Chief Complaint  
Patient presents with  
Neck Pain  
HPI  
Mihaela is a pleasant   
78-year-old gentleman   
with history of PE on   
Xarelto,  
hypertension,   
hyperlipidemia, thyroid   
disease presenting here   
for right neck  
pain that radiates to the   
occiput that came on   
around 3 AM on    
when he  
had gotten up to go to   
the bathroom. He went to   
bed the prior evening   
feeling  
fine. He had mowed his   
lawn on his riding mower   
as usual the prior day   
without  
any injury or inciting   
event otherwise. He notes   
the pain is severe when   
trying  
to move his head or neck   
around at all and has not   
remitted despite heat   
being  
pad and Tylenol. He has   
not noticed any dizziness   
lightheadedness,   
numbness,  
weakness, chest pain,   
shortness of breath. He   
notes history of stiff   
neck  
occasionally in the past   
but never with symptoms   
this severe or   
persistent.  
MDM/COURSE  
I did personally review   
Mihaela's past medical   
history, surgical   
history, social  
history, as well as   
family history (when   
relevant). In this case,   
I also  
oversaw the his drug   
management by reviewing   
his medication list,   
allergy list,  
as well as the   
medications that I   
prescribed during the ED   
course and/or  
recommended as an   
out-patient (including   
possible OTC medications   
such as  
acetaminophen, NSAIDs ,   
etc).  
His past medical problem   
list included:  
Active Ambulatory   
Problems  
Diagnosis Date Noted  
No Active Ambulatory   
Problems  
Resolved Ambulatory   
Problems  
Diagnosis Date Noted  
No Resolved Ambulatory   
Problems  
Past Medical History:  
Diagnosis Date  
Disease of thyroid gland  
Hyperlipidemia  
Hypertension  
PE (pulmonary   
thromboembolism) (HCC)  
ED MEDICATIONS GIVEN:  
Medications  
sodium chloride (PF) (NS)   
flush 5 mL (has no   
administration in time   
range)  
And  
sodium chloride 0.9% (NS)   
(has no administration in   
time range)  
morphine injection 2 mg   
(2 mg Intravenous Given   
24)  
ondansetron (ZOFRAN)   
injection 4 mg (4 mg   
Intravenous Given 24)  
cyclobenzaprine   
(FLEXERIL) tablet 10 mg   
(10 mg Oral Given 24)  
sodium chloride (PF) (NS)   
0.9 % contrast line flush   
10 mL (10 mL Intravenous  
Given 24 022)  
And  
sodium chloride (PF) (NS)   
0.9 % contrast line flush   
80 mL (80 mL Intravenous  
Given 24 0220)  
iopamidoL (ISOVUE-370)   
370 mg iodine /mL (76 %)   
injection 75 mL (75 mL  
Intravenous Contrast   
Administered 24 0217)  
After reviewing the items   
above, I did look at   
previous medical   
documentation,  
such as recent   
hospitalizations, office   
visits, and/or recent   
consultations with  
PCP/specialist.  
SDOH: Another factor that   
I considered in Mihaela's   
care was his Social  
Determinants of Health   
(SDOH). During this ED   
encounter, he did NOT   
appear to  
have any significant   
issues identified.  
LAB TESTING: Labs were   
obtained during this   
encounter. Labs were   
compared to  
prior values VBG is   
benign.  
RADIOLOGY: I did consider   
radiological studies for   
Mihaela's care today.  
Radiology testing was   
notable for no hemorrhage   
or mass effect on my  
independent review of   
noncontrast portion of   
CTA of the brain. CT of   
the  
cervical spine is notable   
for left pleural   
effusion.  
DIFFERENTIAL DIAGNOSES:   
Some general clinical   
impressions that I   
considered  
included torticollis,   
degenerative disc   
disease, vascular   
emergency  
ED COURSE:  
Patient with pleural   
effusion on the left. He   
notes history of left   
upper  
lobectomy related to lung   
cancer  
Symptoms are improving on   
reassessment. CTA of the   
head and neck is notable   
for  
incidental finding of   
right carotid artery   
stenosis. No hemorrhage   
or  
dissection. Patient has   
appropriate primary care   
follow-up available. He   
was  
discharged home with   
supportive care  
On this particular ED   
encounter, I did utilize   
shared decision making.  
After consideration of   
the risks of   
hospitalization such as   
nosocomial  
infections, falls,   
thromboembolic disease as   
well as being   
discharged(worsening  
condition or   
complications up to   
cardiopulmonary arrest)   
at this time the most  
appropriate disposition   
for Mihaela is Discharged  
.  
IMPRESSION  
1. Neck pain  
2. Torticollis  
3. Stenosis of right   
carotid artery  
Past Medical History  
Past Medical History:  
Diagnosis Date  
Disease of thyroid gland  
Hyperlipidemia  
Hypertension  
PE (pulmonary   
thromboembolism) (HCC)  
Past Surgical History  
History reviewed. No   
pertinent surgical   
history.  
Family History  
History reviewed. No   
pertinent family history.  
Social History  
Social History  
Tobacco Use  
Smoking status: Never  
Passive exposure: Never  
Smokeless tobacco: Never  
Vaping Use  
Vaping status: Never Used  
Substance Use Topics  
Drug use: Never  
Allergies  
No Known Allergies  
Medications  
Active Home Medications  
Medication Sig Take Last   
Dose On Take Morning of   
Surgery Comment(s)  
amLODIPine (NORVASC) 5 MG   
tablet Take 1 (one)   
tablet (5 mg total) by   
mouth  
daily .  
cyclobenzaprine   
(FLEXERIL) 10 MG tablet   
Take 1 (one) tablet (10   
mg total) by  
mouth 3 (three) times a   
(more content not   
included)...        Normal                                  [facility]  
   
                                                    POC VBG (EPOC) WITH FULL PAN  
[facility] - Jaime 2024   
   
                      BASE EXCESS, VENOUS 0.9        Normal     -2.0-2.0   [facility]  
   
                                        Comment on above:   Order Comment: Injur  
y/Trauma or Illness?:Illness/Other  
How long have you had these symptoms (acute/chronic)?:Acute  
Reason for exam?:sob and cough with increased difficulty   
breathing today hx of afib and CHF  
History of cancer?:prostate  
Surgeries, chemotherapy, or radiation?:partial lung removal,   
CABG, back surgery  
Type of Exam?:Initial  
Additional signs and symptoms?:na   
   
                      CALCIUM IONIZED 4.4 mg/dL  Low        4.5-5.3    [facility]  
   
                                        Comment on above:   Order Comment: Injur  
y/Trauma or Illness?:Illness/Other  
How long have you had these symptoms (acute/chronic)?:Acute  
Reason for exam?:sob and cough with increased difficulty   
breathing today hx of afib and CHF  
History of cancer?:prostate  
Surgeries, chemotherapy, or radiation?:partial lung removal,   
CABG, back surgery  
Type of Exam?:Initial  
Additional signs and symptoms?:na   
   
                      Chloride [Moles/Vol] 107 mmol/L Normal          [facility]  
   
                                        Comment on above:   Order Comment: Injur  
y/Trauma or Illness?:Illness/Other  
How long have you had these symptoms (acute/chronic)?:Acute  
Reason for exam?:sob and cough with increased difficulty   
breathing today hx of afib and CHF  
History of cancer?:prostate  
Surgeries, chemotherapy, or radiation?:partial lung removal,   
CABG, back surgery  
Type of Exam?:Initial  
Additional signs and symptoms?:na   
   
                                                    Creatinine   
[Mass/Vol]      0.93 mg/dL      Normal          0.80-1.30       [facility]  
   
                                        Comment on above:   Order Comment: Injur  
y/Trauma or Illness?:Illness/Other  
How long have you had these symptoms (acute/chronic)?:Acute  
Reason for exam?:sob and cough with increased difficulty   
breathing today hx of afib and CHF  
History of cancer?:prostate  
Surgeries, chemotherapy, or radiation?:partial lung removal,   
CABG, back surgery  
Type of Exam?:Initial  
Additional signs and symptoms?:na   
   
                      Glucose [Mass/Vol] 140 mg/dL  High       65-99      [facility]  
   
                                        Comment on above:   Order Comment: Injur  
y/Trauma or Illness?:Illness/Other  
How long have you had these symptoms (acute/chronic)?:Acute  
Reason for exam?:sob and cough with increased difficulty   
breathing today hx of afib and CHF  
History of cancer?:prostate  
Surgeries, chemotherapy, or radiation?:partial lung removal,   
CABG, back surgery  
Type of Exam?:Initial  
Additional signs and symptoms?:na   
   
                                                    HCO3 (Bld)   
[Moles/Vol]     26.7 mmol/L     Normal          24.0-28.0       [facility]  
   
                                        Comment on above:   Order Comment: Injur  
y/Trauma or Illness?:Illness/Other  
How long have you had these symptoms (acute/chronic)?:Acute  
Reason for exam?:sob and cough with increased difficulty   
breathing today hx of afib and CHF  
History of cancer?:prostate  
Surgeries, chemotherapy, or radiation?:partial lung removal,   
CABG, back surgery  
Type of Exam?:Initial  
Additional signs and symptoms?:na   
   
                                                    Hematocrit (Bld)   
[Volume fraction] 47 %            Normal          41-53           [facility]  
   
                                        Comment on above:   Order Comment: Injur  
y/Trauma or Illness?:Illness/Other  
How long have you had these symptoms (acute/chronic)?:Acute  
Reason for exam?:sob and cough with increased difficulty   
breathing today hx of afib and CHF  
History of cancer?:prostate  
Surgeries, chemotherapy, or radiation?:partial lung removal,   
CABG, back surgery  
Type of Exam?:Initial  
Additional signs and symptoms?:na   
   
                                                    HEMOGLOBIN,   
CALCULATED      16.0 g/dL       Normal          13.5-17.5       [facility]  
   
                                        Comment on above:   Order Comment: Injur  
y/Trauma or Illness?:Illness/Other  
How long have you had these symptoms (acute/chronic)?:Acute  
Reason for exam?:sob and cough with increased difficulty   
breathing today hx of afib and CHF  
History of cancer?:prostate  
Surgeries, chemotherapy, or radiation?:partial lung removal,   
CABG, back surgery  
Type of Exam?:Initial  
Additional signs and symptoms?:na   
   
                                                    Oxygen saturation in   
Blood           86.0 %          High            40.0-70.0       [facility]  
   
                                        Comment on above:   Order Comment: Injur  
y/Trauma or Illness?:Illness/Other  
How long have you had these symptoms (acute/chronic)?:Acute  
Reason for exam?:sob and cough with increased difficulty   
breathing today hx of afib and CHF  
History of cancer?:prostate  
Surgeries, chemotherapy, or radiation?:partial lung removal,   
CABG, back surgery  
Type of Exam?:Initial  
Additional signs and symptoms?:na   
   
                      PCO2 VENOUS 45.5 mm Hg Normal     41.0-51.0  [facility]  
   
                                        Comment on above:   Order Comment: Injur  
y/Trauma or Illness?:Illness/Other  
How long have you had these symptoms (acute/chronic)?:Acute  
Reason for exam?:sob and cough with increased difficulty   
breathing today hx of afib and CHF  
History of cancer?:prostate  
Surgeries, chemotherapy, or radiation?:partial lung removal,   
CABG, back surgery  
Type of Exam?:Initial  
Additional signs and symptoms?:na   
   
                      PH VENOUS  7.38       Normal     7.32-7.42  [facility]  
   
                                        Comment on above:   Order Comment: Injur  
y/Trauma or Illness?:Illness/Other  
How long have you had these symptoms (acute/chronic)?:Acute  
Reason for exam?:sob and cough with increased difficulty   
breathing today hx of afib and CHF  
History of cancer?:prostate  
Surgeries, chemotherapy, or radiation?:partial lung removal,   
CABG, back surgery  
Type of Exam?:Initial  
Additional signs and symptoms?:na   
   
                      PO2 VENOUS 53 mm Hg   High       25-40      [facility]  
   
                                        Comment on above:   Order Comment: Injur  
y/Trauma or Illness?:Illness/Other  
How long have you had these symptoms (acute/chronic)?:Acute  
Reason for exam?:sob and cough with increased difficulty   
breathing today hx of afib and CHF  
History of cancer?:prostate  
Surgeries, chemotherapy, or radiation?:partial lung removal,   
CABG, back surgery  
Type of Exam?:Initial  
Additional signs and symptoms?:na   
   
                      POC GFR    84 mL/min/1.73 m2 Normal     >=60       [facility]  
   
                                        Comment on above:   Order Comment: Injur  
y/Trauma or Illness?:Illness/Other  
How long have you had these symptoms (acute/chronic)?:Acute  
Reason for exam?:sob and cough with increased difficulty   
breathing today hx of afib and CHF  
History of cancer?:prostate  
Surgeries, chemotherapy, or radiation?:partial lung removal,   
CABG, back surgery  
Type of Exam?:Initial  
Additional signs and symptoms?:na   
   
                                                            Result Comment: Nancy  
mated GFR was calculated using the    
CKD-EPI creatinine equation.   
   
                      POC LACTATE 1.0 mmol/L Normal     0.6-2.0    [facility]  
   
                                        Comment on above:   Order Comment: Injur  
y/Trauma or Illness?:Illness/Other  
How long have you had these symptoms (acute/chronic)?:Acute  
Reason for exam?:sob and cough with increased difficulty   
breathing today hx of afib and CHF  
History of cancer?:prostate  
Surgeries, chemotherapy, or radiation?:partial lung removal,   
CABG, back surgery  
Type of Exam?:Initial  
Additional signs and symptoms?:na   
   
                                                    Potassium   
[Moles/Vol]     4.3 mmol/L      Normal          3.5-5.1         [facility]  
   
                                        Comment on above:   Order Comment: Injur  
y/Trauma or Illness?:Illness/Other  
How long have you had these symptoms (acute/chronic)?:Acute  
Reason for exam?:sob and cough with increased difficulty   
breathing today hx of afib and CHF  
History of cancer?:prostate  
Surgeries, chemotherapy, or radiation?:partial lung removal,   
CABG, back surgery  
Type of Exam?:Initial  
Additional signs and symptoms?:na   
   
                      Sodium [Moles/Vol] 143 mmol/L Normal     135-145    [facility]  
   
                                        Comment on above:   Order Comment: Injur  
y/Trauma or Illness?:Illness/Other  
How long have you had these symptoms (acute/chronic)?:Acute  
Reason for exam?:sob and cough with increased difficulty   
breathing today hx of afib and CHF  
History of cancer?:prostate  
Surgeries, chemotherapy, or radiation?:partial lung removal,   
CABG, back surgery  
Type of Exam?:Initial  
Additional signs and symptoms?:na   
   
                                                    Urea nitrogen   
[Mass/Vol]      30 mg/dL        High                        [facility]  
   
                                        Comment on above:   Order Comment: Injur  
y/Trauma or Illness?:Illness/Other  
How long have you had these symptoms (acute/chronic)?:Acute  
Reason for exam?:sob and cough with increased difficulty   
breathing today hx of afib and CHF  
History of cancer?:prostate  
Surgeries, chemotherapy, or radiation?:partial lung removal,   
CABG, back surgery  
Type of Exam?:Initial  
Additional signs and symptoms?:na   
   
                                                    CBC W Auto Differential pane  
l (Bld)on 2024   
   
                                                    Basophils (Bld)   
[#/Vol]         0.02 x10*3/uL   Normal          0.00-0.10       [facility]  
   
                                        Comment on above:   Performed By: #### 2  
4323-8 ####  
ZARATE ARNULFO ([id number])  
[facility] ([facility])  
[address] [place] [address]   
   
                                                    Basophils/100 WBC   
(Bld)           0.2 %           Normal          0.0-2.0         [facility]  
   
                                        Comment on above:   Performed By: #### 2  
4323-8 ####  
ELLIOT ARREDONDO ([id number])  
[facility] ([facility])  
[address] [place] [address]   
   
                                                    Eosinophils (Bld)   
[#/Vol]         0.09 x10*3/uL   Normal          0.00-0.40       [facility]  
   
                                        Comment on above:   Performed By: #### 2  
-8 ####  
ELLIOT ARREDONDO ([id number])  
[facility] ([facility])  
[address] [place] [address]   
   
                                                    Eosinophils/100 WBC   
(Bld)           1.1 %           Normal          0.0-6.0         [facility]  
   
                                        Comment on above:   Performed By: #### 2  
-8 ####  
ELLIOT ARREDONDO ([id number])  
[facility] ([facility])  
[address] [place] [address]   
   
                                                    Erythrocyte   
distribution width   
(RBC) [Ratio]   13.2 %          Normal          11.5-14.5       [facility]  
   
                                        Comment on above:   Performed By: #### 2  
-8 ####  
ELLIOT ARREDONDO ([id number])  
[facility] ([facility])  
[address] [place] [address]   
   
                                                    Hematocrit (Bld)   
[Volume fraction] 42.7 %          Normal          41.0-52.0       [facility]  
   
                                        Comment on above:   Performed By: #### 2  
-8 ####  
ELLIOT ARREDONDO ([id number])  
[facility] ([facility])  
[address] [place] [address]   
   
                                                    Hemoglobin (Bld)   
[Mass/Vol]      13.7 g/dL       Normal          13.5-17.5       [facility]  
   
                                        Comment on above:   Performed By: #### 2  
4323-8 ####  
ELLIOT ARREDONDO ([id number])  
[facility] ([facility])  
[address] [place] [address]   
   
                                                    Immature   
granulocytes (Bld)   
[#/Vol]         0.02 x10*3/uL   Normal          0.00-0.50       [facility]  
   
                                        Comment on above:   Performed By: #### 2  
4323-8 ####  
ELLIOT ARREDONDO ([id number])  
[facility] ([facility])  
[address] [place] [address]   
   
                                                    Immature   
granulocytes/100 WBC   
(Bld)           0.2 %           Normal          0.0-0.9         [facility]  
   
                                        Comment on above:   Result Comment: Angeli  
ture Granulocyte Count (IG) includes   
promyelocytes, myelocytes and metamyelocytes but does not include   
bands. Percent differential counts (%) should be interpreted in   
the context of the absolute cell counts (cells/UL).   
   
                                                            Performed By: #### 2  
4323-8 ####  
ELLIOT ARREDONDO ([id number])  
[facility] ([facility])  
[address] [place] [address]   
   
                                                    Lymphocytes (Bld)   
[#/Vol]         0.75 x10*3/uL   Low             0.80-3.00       [facility]  
   
                                        Comment on above:   Performed By: #### 2  
4323-8 ####  
ELLIOT ARREDONDO ([id number])  
[facility] ([facility])  
[address] [place] [address]   
   
                                                    Lymphocytes/100 WBC   
(Bld)           9.2 %           Normal          13.0-44.0       [facility]  
   
                                        Comment on above:   Performed By: #### 2  
4323-8 ####  
ELLIOT ARREDONDO ([id number])  
[facility] ([facility])  
[address] [place] [address]   
   
                                                    MCH (RBC) [Entitic   
mass]           29.2 pg         Normal          26.0-34.0       [facility]  
   
                                        Comment on above:   Performed By: #### 2  
4323-8 ####  
ELLIOT ARREDONDO ([id number])  
[facility] ([facility])  
[address] [place] [address]   
   
                                                    MCHC (RBC)   
[Mass/Vol]      32.1 g/dL       Normal          32.0-36.0       [facility]  
   
                                        Comment on above:   Performed By: #### 2  
4323-8 ####  
ELLIOT ARREDONDO ([id number])  
[facility] ([facility])  
[address] [place] [address]   
   
                                                    MCV (RBC) [Entitic   
vol]            91 fL           Normal                    [facility]  
   
                                        Comment on above:   Performed By: #### 2  
4323-8 ####  
ELLIOT ARREDONDO ([id number])  
[facility] ([facility])  
[address] [place] [address]   
   
                                                    Monocytes (Bld)   
[#/Vol]         1.19 x10*3/uL   High            0.05-0.80       [facility]  
   
                                        Comment on above:   Performed By: #### 2  
4323-8 ####  
ELLIOT ARREDONDO ([id number])  
[facility] ([facility])  
[address] [place] [address]   
   
                                                    Monocytes/100 WBC   
(Bld)           14.5 %          Normal          2.0-10.0        [facility]  
   
                                        Comment on above:   Performed By: #### 2  
4323-8 ####  
ELLIOT ARREDONDO ([id number])  
[facility] ([facility])  
[address] [place] [address]   
   
                                                    Neutrophils (Bld)   
[#/Vol]         6.12 x10*3/uL   High            1.60-5.50       [facility]  
   
                                        Comment on above:   Result Comment: Perc  
ent differential counts (%) should be   
interpreted in the context of the absolute cell counts   
(cells/uL).   
   
                                                            Performed By: #### 2  
4323-8 ####  
ELLIOT ARREDONDO ([id number])  
[facility] ([facility])  
[address] [place] [address]   
   
                                                    Neutrophils/100 WBC   
(Bld)           74.8 %          Normal          40.0-80.0       [facility]  
   
                                        Comment on above:   Performed By: #### 2  
4323-8 ####  
ELLIOT ARREDONDO ([id number])  
[facility] ([facility])  
[address] [place] [address]   
   
                                                    Nucleated RBC/100   
WBC (Bld) [Ratio] 0.0 /100 WBCs   Normal          0.0-0.0         [facility]  
   
                                        Comment on above:   Performed By: #### 2  
4323-8 ####  
ELLIOT ARREDONDO ([id number])  
[facility] ([facility])  
[address] [place] [address]   
   
                                                    Platelets (Bld)   
[#/Vol]         228 x10*3/uL    Normal          150-450         [facility]  
   
                                        Comment on above:   Performed By: #### 2  
4323-8 ####  
ELLIOT ARREDONDO ([id number])  
[facility] ([facility])  
[address] [place] [address]   
   
                      RBC (Bld) [#/Vol] 4.69 x10*6/uL Normal     4.50-5.90  [facility]  
   
                                        Comment on above:   Performed By: #### 2  
4323-8 ####  
ELLIOT ARREDONDO ([id number])  
[facility] ([facility])  
[address] [place] [address]   
   
                      WBC (Bld) [#/Vol] 8.2 x10*3/uL Normal     4.4-11.3   [facility]  
   
                                        Comment on above:   Performed By: #### 2  
4323-8 ####  
ELLIOT ARREDONDO ([id number])  
[facility] ([facility])  
[address] [place] [address]   
   
                                                    Heterophile Abon 2024   
   
                                                    Heterophile Ab   
IA.rapid Ql   
(S/P/Bld)       Negative        Normal          Negative        [facility]  
   
                                        Comment on above:   Performed By: #### 2  
4323-8 ####  
ELLIOT ARREDONDO ([id number])  
[facility] ([facility])  
[address] [place] [address]   
   
                                                    ED Prov Noteon 2024   
   
                                        ED Prov Note        ED PROVIDER NOTE  
[facility]   
EMERGENCY DEPARTMENT  
NAME: Mihaela Heaton AGE:   
78 y.o. : 1945  
VISIT DATE: 2024  
MRN: [id number]  
CSN: [id number]  
PCP: Jake Jones MD  
Chief Complaint  
Patient presents with  
Sore Throat  
Patient is a 78-year-old   
male with a past medical   
history of pulmonary   
embolism,  
hypertension,   
hyperlipidemia and   
thyroid disease who   
presents today for   
concern  
of sore throat. Patient   
states last days had a   
sore throat but denies   
any  
inability have   
secretions, drooling,   
abdominal pain, back   
pain, change in voice,  
chest pain, shortness of   
breath, nausea, vomiting,   
fevers, or additional ENT  
symptoms.  
Past Medical History:  
Diagnosis Date  
Disease of thyroid gland  
Hyperlipidemia  
Hypertension  
PE (pulmonary   
thromboembolism) (HCC)  
History reviewed. No   
pertinent surgical   
history.  
History reviewed. No   
pertinent family history.  
Social History  
Socioeconomic History  
Marital status:   
Tobacco Use  
Smoking status: Never  
Passive exposure: Never  
Smokeless tobacco: Never  
Vaping Use  
Vaping Use: Never used  
Substance and Sexual   
Activity  
Drug use: Never  
Previous Medications  
Medication Sig  
amLODIPine (NORVASC) 5 MG   
tablet Take 1 (one)   
tablet (5 mg total) by   
mouth  
daily .  
ketorolac (ACULAR) 0.5 %   
ophthalmic solution   
instill one drop IN THE   
RIGHT EYE  
TWICE DAILY  
levothyroxine (SYNTHROID,   
LEVOTHROID) 50 MCG tablet   
Take 1 tablet (50 mcg) by  
mouth once daily.  
lisinopriL-hydrochlorothi  
azide   
(PRINZIDE,ZESTORETIC)   
20-25 mg per tablet  
metoprolol succinate   
(TOPROL-XL) 25 MG 24 hr   
tablet  
pantoprazole (PROTONIX)   
40 MG tablet  
pravastatin (PRAVACHOL)   
40 MG tablet  
Prolia 60 mg/mL Syrg  
Trelegy Ellipta   
100-62.5-25 mcg DsDv  
Xarelto 20 mg Tab  
No Known Allergies  
Review of Systems  
Constitutional: Negative   
for chills and fever.  
HENT: Positive for sore   
throat.  
Eyes: Negative for pain.  
Respiratory: Negative for   
cough, chest tightness   
and shortness of breath.  
Cardiovascular: Negative   
for chest pain and   
palpitations.  
Gastrointestinal:   
Negative for abdominal   
pain, nausea and   
vomiting.  
Genitourinary: Negative   
for flank pain.  
Musculoskeletal: Negative   
for arthralgias and   
myalgias.  
Skin: Negative for rash.  
Neurological: Negative   
for dizziness, syncope,   
light-headedness and   
headaches.  
Psychiatric/Behavioral:   
Negative for agitation.  
All other systems   
reviewed and are   
negative.  
Patient Vitals for the   
past 24 hrs:  
BP Temp Temp src Pulse   
Resp SpO2 Height Weight  
24 1042 131/71 98.9   
degrees F (37.2 degrees   
C) Temporal -- -- -- --   
--  
24 1041 -- -- -- 80   
16 95 % 5' 11  83.9 kg   
(185 lb)  
Physical Exam  
Vitals and nursing note   
reviewed.  
Constitutional:  
Appearance: Normal   
appearance.  
HENT:  
Head: Normocephalic.  
Nose: No congestion or   
rhinorrhea.  
Mouth/Throat:  
Mouth: No oral lesions.  
Pharynx: Pharyngeal   
swelling and posterior   
oropharyngeal erythema   
present. No  
oropharyngeal exudate or   
uvula swelling.  
Tonsils: No tonsillar   
exudate or tonsillar   
abscesses.  
Eyes:  
Pupils: Pupils are equal,   
round, and reactive to   
light.  
Cardiovascular:  
Rate and Rhythm: Normal   
rate and regular rhythm.  
Pulses: Normal pulses.  
Heart sounds: Normal   
heart sounds.  
Musculoskeletal:  
Cervical back: Normal   
range of motion.  
Pulmonary:  
Effort: Pulmonary effort   
is normal. No respiratory   
distress.  
Breath sounds: Normal   
breath sounds. No   
wheezing or rales.  
Abdominal:  
General: There is no   
distension.  
Palpations: Abdomen is   
soft.  
Tenderness: There is no   
abdominal tenderness.  
Skin:  
General: Skin is warm.  
Capillary Refill:   
Capillary refill takes   
less than 2 seconds.  
Neurological:  
General: No focal deficit   
present.  
Mental Status: He is   
alert.  
Psychiatric:  
Mood and Affect: Mood   
normal.  
.  
Laboratory & Radiographic   
Imaging (if done):  
Results for orders placed   
or performed during the   
hospital encounter of   
24  
POC Strep A - Molecular  
Result Value Ref Range  
Strep A Screen Negative   
Negative  
No orders to display  
Procedures  
Medical Decision Making  
Patient seen and   
evaluated for concern of   
sore throat. Rapid strep   
was  
negative. Patient   
suspected viral   
pharyngitis and was given   
a dose of Decadron  
for symptomatic relief.   
Patient not any red flags   
of sore throat and   
therefore  
additional workup was not   
done. Clinically   
well-hydrated cap refill   
less than 2  
seconds patient agrees   
assessment and plan the   
patient was discharged in   
stable  
condition  
.  
Clinical Impression:  
1. Pharyngitis,   
unspecified etiology  
ED Disposition  
ED Disposition  
Discharge  
Condition  
Stable  
Comment  
Mihaela Heaton discharged   
to home/self care in   
stable condition.  
Follow-up Information  
1. Jake Jones MD.  
Specialty: Family   
Medicine  
Why: As needed, If   
symptoms worsen  
 [place] [id number] [phone]  
Contact information for   
after-discharge care  
Follow-up information has   
not been specified.  
Asad Quiroz, DO  
/ (more content not   
included)...        Normal                                  [facility]  
   
                                                    POC STREP A - MOLECULAR RALS  
on 2024   
   
                      POC STREP A SCREEN Negative   Normal     Negative   [facility]  
   
                                                    Triiodothyronineon   
4   
   
                      T3 [Mass/Vol] 103 ng/dL  Normal          [facility]  
   
                                        Comment on above:   Performed By: #### 2  
4323-8 ####  
ZARATE ARNULFO ([id number])  
[facility] ([facility])  
[address] [place], [place] [address]   
   
                                                    TSH WITH REFLEX TO FREE T4 I  
F ABNORMALon 2024   
   
                      TSH Qn     46.00 m[IU]/L High       0.44-3.98  [facility]  
   
                                        Comment on above:   Order Comment: TSH t  
esting is performed using different   
testing   
methodology at [facility] than at other [facility]. Direct result comparisons should only be made within   
the same method.   
   
                                                            Performed By: #### T  
LESLIES ####  
ELLIOT ARREDONDO ([id number])  
[facility] ([facility])  
[address] [place], [place] [address]   
   
                                                    Thyroxine.freeon 2024   
   
                      Free T4 [Mass/Vol] 1.55 ng/dL High       0.61-1.12  [facility]  
   
                                        Comment on above:   Order Comment: Thyro  
xine Free testing is performed using   
different testing methodology at [facility] than at   
[facility]. Direct result comparisons should only be   
made within the same method.  
Biotin can cause falsely elevated free T4 results. Patients   
taking a Biotin dose of up to 10 mg/day should refrain from   
taking Biotin for 24 hours before sample collection. Patient   
taking a Biotin dose of >10 mg/day should consult with their   
physician or the laboratory before the blood draw.   
   
                                                            Performed By: #### 3  
024-7 ####  
ELLIOT ARREDONDO ([id number])  
[facility] ([facility])  
[address] [place], [place] [address]   
   
                                                    Triiodothyronineon   
4   
   
                      T3 [Mass/Vol]            Normal          [facility]  
   
                                        Comment on above:   Result Comment: CANC  
ELLED DUE TO WRONG SPECIMEN TYPE  
Corrected result: Previously reported as 149 ng/dL (reference   
range:  ng/dL) on 2024 at 0038 EDT.   
   
                                                            Performed By: #### 2  
4323-8 ####  
ELLIOT ARREDONDO ([id number])  
[facility] ([facility])  
[address] [place], [place] [address]   
   
                                                    DEXA BONE DENSITYon 01-15-20  
24   
   
                                        DEXA BONE DENSITY   Interpreted By: Kamaljit Pastor,  
ADDENDUM:  
The body of the report   
should read as follows:  
  
FINDINGS:  
SPINE L1-L4  
Bone Mineral Density:   
1.209  
T-Score -0.1 Z-Score 0.3  
Bone Mineral Density   
change vs baseline: Not   
reported  
Bone Mineral Density   
change vs previous: Not   
reported  
  
LEFT FEMUR -TOTAL  
Bone Mineral Density:   
0.676  
T-Score -2.6 Z-Score -1.9  
Bone Mineral Density   
change vs baseline: Not   
reported  
Bone Mineral Density   
change vs previous: Not   
reported  
  
LEFT FEMUR -NECK  
Bone Mineral Density:   
0.665  
T-Score -2.7 Z-Score -1.6  
  
RIGHT FEMUR -TOTAL  
Bone Mineral Density:   
0.650  
T-Score -2.8 Z-Score -2.1  
Bone Mineral Density   
change vs baseline: Not   
reported  
Bone Mineral Density   
change vs previous: Not   
reported  
  
RIGHT FEMUR -NECK  
Bone Mineral Density:   
0.639  
T-Score -2.9 Z-Score -1.8  
  
Signed by: Kamaljit Saba   
2024 1:28 PM  
  
-------- ORIGINAL REPORT   
--------  
Dictation workstation:   
[id number] Interpreted   
By: Kamaljit Saba,  
STUDY:  
DEXA BONE   
DENSITY1/15/2024 1:23 pm  
  
INDICATION:  
Signs/Symptoms:osteoporos  
is on Prolia. The patient   
is a 77 y/o year  
old M.  
  
COMPARISON:  
2021  
  
ACCESSION NUMBER(S):  
[id number]  
  
ORDERING CLINICIAN:  
JAKE JONES  
  
TECHNIQUE:  
DEXA BONE DENSITY  
  
FINDINGS:  
SPINE L1-L4  
Bone Mineral Density: Not   
reported  
T-Score Not reported   
Z-Score Not reported  
Bone Mineral Density   
change vs baseline: Not   
reported  
Bone Mineral Density   
change vs previous: Not   
reported  
  
LEFT FEMUR -TOTAL  
Bone Mineral Density: Not   
reported  
T-Score Not reported   
Z-Score Not reported  
Bone Mineral Density   
change vs baseline: Not   
reported  
Bone Mineral Density   
change vs previous: Not   
reported  
  
LEFT FEMUR -NECK  
Bone Mineral Density: Not   
reported  
T-Score Not reported   
Z-Score Not reported  
  
LEFT FOREARM  
Total  
Bone Mineral Density: Not   
reported  
T-Score Not reported   
Z-Score Not reported  
UD  
Bone Mineral Density: Not   
reported  
Mineral Density change vs   
previous: Not reported  
  
RIGHT FEMUR -TOTAL  
Bone Mineral Density: Not   
reported  
T-Score Not reported   
Z-Score Not reported  
Bone Mineral Density   
change vs baseline: Not   
reported  
Bone Mineral Density   
change vs previous: Not   
reported  
  
RIGHT FEMUR -NECK  
Bone Mineral Density: Not   
reported  
T-Score Not reported   
Z-Score Not reported  
  
  
World Health Organization   
(WHO) criteria for   
post-menopausal,  
 Women:  
Normal: T-score at or   
above -1 SD  
Osteopenia: T-score   
between -1 and -2.5 SD  
Osteoporosis: T-score at   
or below -2.5 SD  
  
  
10-year Fracture Risk:  
Major Osteoporotic   
Fracture 14.6  
Hip Fracture 7.4  
  
Note: If no FRAX score is   
reported, it is because:  
Some T-score for Spine   
Total or Hip Total or   
Femoral Neck at or below  
-2.5  
  
Vertebral Deformity   
Assessment: Exam Date -   
1/15/2024 1:23 pm  
-------------------------  
-------------------------  
---------------  
Vertebral Level   
Impression  
-------------------------  
-------------------------  
---------------  
T4 None  
T5 None  
T6 None  
T7 None  
T8 None  
T9 severe biconcavity  
T10 None  
T11 None  
T12 None  
L1 None  
L2 None  
L3 None  
L4 None  
-------------------------  
-------------------------  
---------------  
A spine fracture   
indicates 5X risk for   
subsequent spine fracture  
and 2X risk for   
subsequent hip fracture.  
  
This exam was performed   
at [facility]'s   
[facility] on a GE Lunar  
Pro[employer] Advanced Dexa   
Unit.  
  
IMPRESSION:  
DEXA: According to World   
Health Organization   
criteria,  
classification is   
osteoporosis.  
  
Followup recommended in   
two years or sooner as   
clinically warranted.  
  
VFA: Possible compression   
fracture is seen at the   
T9 level  
  
All images and detailed   
analysis are available on   
the  Radiology  
PACS.  
  
MACRO:  
None  
  
Signed by: Kamaljit Saba   
2024 1:05 PM  
Dictation workstation:   
[id number]        Normal                                  [facility]  
   
                                                    Triiodothyronineon 01-  
4   
   
                      T3 [Mass/Vol] 108 ng/dL  Normal          [facility]  
   
                                        Comment on above:   Performed By: #### 3  
053-6 ####  
DEMI SHIELDS ([id number])  
[facility] ([facility])  
[address] [place] [address]   
   
                                                    CBC panel Auto (Bld)on    
   
                                                    Erythrocyte   
distribution width   
(RBC) [Ratio]   13.3 %          Normal          11.5-14.5       [facility]  
   
                                        Comment on above:   Performed By: #### 5  
8410-2 ####  
ELLIOT ARREDONDO ([id number])  
[facility] ([facility])  
[address] [place] [address]   
   
                                                    Hematocrit (Bld)   
[Volume fraction] 45.6 %          Normal          41.0-52.0       [facility]  
   
                                        Comment on above:   Performed By: #### 5  
8410-2 ####  
ELLIOT ARREDONDO ([id number])  
[facility] ([facility])  
[address] [place] [address]   
   
                                                    Hemoglobin (Bld)   
[Mass/Vol]      14.5 g/dL       Normal          13.5-17.5       [facility]  
   
                                        Comment on above:   Performed By: #### 5  
8410-2 ####  
ELLIOT ARREDONDO ([id number])  
[facility] ([facility])  
[address] [place] [address]   
   
                                                    MCH (RBC) [Entitic   
mass]           30.6 pg         Normal          26.0-34.0       [facility]  
   
                                        Comment on above:   Performed By: #### 5  
8410-2 ####  
ELLIOT ARREDONDO ([id number])  
[facility] ([facility])  
[address] [place] [address]   
   
                                                    MCHC (RBC)   
[Mass/Vol]      31.8 g/dL       Low             32.0-36.0       [facility]  
   
                                        Comment on above:   Performed By: #### 5  
8410-2 ####  
ELLIOT ARREODNDO ([id number])  
[facility] ([facility])  
[address] [place] [address]   
   
                                                    MCV (RBC) [Entitic   
vol]            96 fL           Normal                    [facility]  
   
                                        Comment on above:   Performed By: #### 5  
8410-2 ####  
ELLIOT ARREDONDO ([id number])  
[facility] ([facility])  
[address] [place] [address]   
   
                                                    Nucleated RBC/100   
WBC (Bld) [Ratio] 0.0 /100 WBCs   Normal          0.0-0.0         [facility]  
   
                                        Comment on above:   Performed By: #### 5  
8410-2 ####  
ELLIOT ARREDONDO ([id number])  
[facility] ([facility])  
[address] [place] [address]   
   
                                                    Platelets (Bld)   
[#/Vol]         187 x10*3/uL    Normal          150-450         [facility]  
   
                                        Comment on above:   Performed By: #### 5  
8410-2 ####  
ELLIOT ARREDONDO ([id number])  
[facility] ([facility])  
[address] [place] [address]   
   
                      RBC (Bld) [#/Vol] 4.74 x10*6/uL Normal     4.50-5.90  [facility]  
   
                                        Comment on above:   Performed By: #### 5  
8410-2 ####  
ELLIOT ARREDONDO ([id number])  
[facility] ([facility])  
[address] [place] [address]   
   
                      WBC (Bld) [#/Vol] 4.5 x10*3/uL Normal     4.4-11.3   [facility]  
   
                                        Comment on above:   Performed By: #### 5  
8410-2 ####  
ELLIOT ARREDONDO ([id number])  
[facility] ([facility])  
[address] [place] [address]   
   
                                                    Comprehensive metabolic 2000  
 panelon 2024   
   
                                                    Albumin BCP dye   
[Mass/Vol]      4.3 g/dL        Normal          3.4-5.0         [facility]  
   
                                        Comment on above:   Performed By: #### 2  
4323-8 ####  
ELLIOT ARREDONDO ([id number])  
[facility] ([facility])  
[address] [place] [address]   
   
                                                    ALP [Catalytic   
activity/Vol]   56 U/L          Normal                    [facility]  
   
                                        Comment on above:   Performed By: #### 2  
4323-8 ####  
ELLIOT ARREDONDO ([id number])  
[facility] ([facility])  
[address] [place] [address]   
   
                                                    ALT With P-5'-P   
[Catalytic   
activity/Vol]   20 U/L          Normal          10-52           [facility]  
   
                                        Comment on above:   Result Comment: Karley  
ents treated with Sulfasalazine may   
generate   
falsely decreased results for ALT.   
   
                                                            Performed By: #### 2  
4323-8 ####  
ELLIOT ARREDONDO ([id number])  
[facility] ([facility])  
[address] [place] [address]   
   
                                                    Anion gap   
[Moles/Vol]     12 mmol/L       Normal          10-20           [facility]  
   
                                        Comment on above:   Performed By: #### 2  
4323-8 ####  
ELLIOT ARREDONDO ([id number])  
[facility] ([facility])  
[address] [place] [address]   
   
                                                    AST With P-5'-P   
[Catalytic   
activity/Vol]   20 U/L          Normal          9-39            [facility]  
   
                                        Comment on above:   Performed By: #### 2  
4323-8 ####  
ELLIOT ARREDONDO ([id number])  
[facility] ([facility])  
[address] [place] [address]   
   
                      Bilirubin [Mass/Vol] 0.6 mg/dL  Normal     0.0-1.2    [facility]  
   
                                        Comment on above:   Performed By: #### 2  
4323-8 ####  
ELLIOT ARREDONDO ([id number])  
[facility] ([facility])  
[address] [place] [address]   
   
                      Calcium [Mass/Vol] 9.4 mg/dL  Normal     8.6-10.3   [facility]  
   
                                        Comment on above:   Performed By: #### 2  
4323-8 ####  
ELLIOT ARREDONDO ([id number])  
[facility] ([facility])  
[address] [place] [address]   
   
                      Chloride [Moles/Vol] 104 mmol/L Normal          [facility]  
   
                                        Comment on above:   Performed By: #### 2  
4323-8 ####  
ELLIOT ARREDONDO ([id number])  
[facility] ([facility])  
[address] [place] [address]   
   
                      CO2 [Moles/Vol] 31 mmol/L  Normal     21-32      [facility]  
   
                                        Comment on above:   Performed By: #### 2  
4323-8 ####  
ELLIOT ARREDONDO ([id number])  
[facility] ([facility])  
[address] [place] [address]   
   
                                                    Creatinine   
[Mass/Vol]      1.10 mg/dL      Normal          0.50-1.30       [facility]  
   
                                        Comment on above:   Performed By: #### 2  
4323-8 ####  
ELLIOT ARREDONDO ([id number])  
[facility] ([facility])  
[address] [place] [address]   
   
                                                    GFR/1.73 sq   
M.predicted MDRD   
(S/P/Bld) [Vol   
rate/Area]      69 mL/min/1.73m*2 Normal          >60             [facility]  
   
                                        Comment on above:   Result Comment: Calc  
ulations of estimated GFR are performed   
using   
the  CKD-EPI Study Refit equation without the race variable   
for the IDMS-Traceable creatinine methods.  
https://jasn.asnjournals.org/content/early/ASN.0478785  
988   
   
                                                            Performed By: #### 2  
4323-8 ####  
ELLIOT ARREDONDO ([id number])  
[facility] ([facility])  
[address] [place] [address]   
   
                      Glucose [Mass/Vol] 100 mg/dL  High       74-99      [facility]  
   
                                        Comment on above:   Performed By: #### 2  
4323-8 ####  
ELLIOT ARREDONDO ([id number])  
[facility] ([facility])  
[address] [place] [address]   
   
                                                    Potassium   
[Moles/Vol]     3.7 mmol/L      Normal          3.5-5.3         [facility]  
   
                                        Comment on above:   Performed By: #### 2  
4323-8 ####  
ELLIOT ARREDONDO ([id number])  
[facility] ([facility])  
[address] [place] [address]   
   
                      Protein [Mass/Vol] 7.0 g/dL   Normal     6.4-8.2    [facility]  
   
                                        Comment on above:   Performed By: #### 2  
4323-8 ####  
ELLIOT ARREDONDO ([id number])  
[facility] ([facility])  
[address] [place] [address]   
   
                      Sodium [Moles/Vol] 143 mmol/L Normal     136-145    [facility]  
   
                                        Comment on above:   Performed By: #### 2  
4323-8 ####  
ELLIOT ARREDONDO ([id number])  
[facility] ([facility])  
[facility] [place] [address]   
   
                                                    Urea nitrogen   
[Mass/Vol]      29 mg/dL        High            6-23            [facility]  
   
                                        Comment on above:   Performed By: #### 2  
4323-8 ####  
ELLIOT ARREDONDO ([id number])  
[facility] ([facility])  
[address] [place] [address]   
   
                                                    HbA1c (Bld) [Mass fraction]o  
n 2024   
   
                                                    Average glucose   
Estimated from   
glycated hemoglobin   
(Bld) [Mass/Vol]    128 mg/dL           Normal              Not   
Established                             [facility]  
   
                                        Comment on above:   Order Comment: Diagn  
osis of Diabetes-Adults  
Non-Diabetic: < or = 5.6%  
Increased risk for developing diabetes: 5.7-6.4%  
Diagnostic of diabetes: > or = 6.5%  
Monitoring of Diabetes  
Age (y)....................... Therapeutic Goal (%)  
Adults: >18.........................<7.0  
Pediatrics: 13-18...................<7.5  
Pediatrics: 7-12....................<8.0  
Pediatrics: 0-6..................... 7.5-8.5  
American Diabetes Association. Diabetes Care 33(S1), 2010   
   
                                                            Performed By: #### 4  
548-4 ####  
ELLIOT ARREDONDO ([id number])  
[facility] ([facility])  
[address] [place] [address]   
   
                                                    Hemoglobin A1c/Hemoglobin.to  
karely 2024   
   
                                                    HbA1c (Bld) [Mass   
fraction]       6.1 %           High            see below       [facility]  
   
                                        Comment on above:   Order Comment: Diagn  
osis of Diabetes-Adults  
Non-Diabetic: < or = 5.6%  
Increased risk for developing diabetes: 5.7-6.4%  
Diagnostic of diabetes: > or = 6.5%  
Monitoring of Diabetes  
Age (y)....................... Therapeutic Goal (%)  
Adults: >18.........................<7.0  
Pediatrics: 13-18...................<7.5  
Pediatrics: 7-12....................<8.0  
Pediatrics: 0-6..................... 7.5-8.5  
American Diabetes Association. Diabetes Care 33(S1), 2010   
   
                                                            Performed By: #### 4  
548-4 ####  
ELLIOT ARREDONDO ([id number])  
[facility] ([facility])  
[facility] [place] [address]   
   
                                                    Lipid 1996 panelon   
4   
   
                                                    Cholesterol   
[Mass/Vol]      197 mg/dL       Normal          0-199           [facility]  
   
                                        Comment on above:   Result Comment:  
Age Desirable Borderline High High  
0-19 Y 0 - 169 170 - 199 >/= 200  
20-24 Y 0 - 189 190 - 224 >/= 225  
>24 Y 0 - 199 200 - 239 >/= 240  
**All ranges are based on fasting samples. Specific  
therapeutic targets will vary based on patient-specific  
cardiac risk.  
Pediatric guidelines reference:Pediatrics 2011, 128(S5).Adult   
guidelines reference: NCEP ATPIII Guidelines,AMINAH 2001,   
258:2486-97  
Venipuncture immediately after or during the administration of   
Metamizole may lead to falsely low results. Testing should be   
performed immediately prior to Metamizole dosing.   
   
                                                            Performed By: #### 2  
4331-1 ####  
ELLIOT ARREDONDO ([id number])  
[facility] ([facility])  
[facility] [place] [address]   
   
                                                    Cholesterol in HDL   
[Mass/Vol]      60.0 mg/dL      Normal                          [facility]  
   
                                        Comment on above:   Result Comment:  
Age Very Low Low Normal High  
0-19 Y < 35 < 40 40-45 ----  
20-24 Y ---- < 40 >45 ----  
>24 Y ---- < 40 40-60 >60   
   
                                                            Performed By: #### 2  
4331-1 ####  
ELLIOT ARREDONDO ([id number])  
[facility] ([facility])  
[address] [place] [address]   
   
                                                    Cholesterol in LDL   
[Mass/Vol]      111 mg/dL       High            <=99            [facility]  
   
                                        Comment on above:   Result Comment:  
Near Borderline  
AGE Desirable Optimal High High Very High  
0-19 Y 0 - 109 --- 110-129 >/= 130 ----  
20-24 Y 0 - 119 --- 120-159 >/= 160 ----  
>24 Y 0 - 99 100-129 130-159 160-189 >/=190   
   
                                                            Performed By: #### 2  
4331-1 ####  
ELLIOT ARREDONDO ([id number])  
[facility] ([facility])  
[address] [place] [address]   
   
                                                    Cholesterol in VLDL   
[Mass/Vol]      26 mg/dL        Normal          0-40            [facility]  
   
                                        Comment on above:   Performed By: #### 2  
4331-1 ####  
ELLIOT ARREDONDO ([id number])  
[facility] ([facility])  
[address] [place] [address]   
   
                                                    CHOLESTEROL/HDL   
RATIO           3.3             Normal                          [facility]  
   
                                        Comment on above:   Result Comment:  
Ref Values  
Desirable < 3.4  
High Risk > 5.0   
   
                                                            Performed By: #### 2  
4331-1 ####  
ELLIOT ARREDONDO ([id number])  
[facility] ([facility])  
[address] [place] [address]   
   
                      NON HDL CHOLESTEROL 137 mg/dL  Normal     0-149      [facility]  
   
                                        Comment on above:   Result Comment:  
Age Desirable Borderline High High Very High  
0-19 Y 0 - 119 120 - 144 >/= 145 >/= 160  
20-24 Y 0 - 149 150 - 189 >/= 190 ----  
>24 Y 30 mg/dL above LDL Cholesterol goal   
   
                                                            Performed By: #### 2  
4331-1 ####  
ELLIOT ARREDONDO ([id number])  
[facility] ([facility])  
[address] [place] [address]   
   
                                                    Triglyceride   
[Mass/Vol]      132 mg/dL       Normal          0-149           [facility]  
   
                                        Comment on above:   Result Comment:  
Age Desirable Borderline High High Very High  
0 D-90 D 19 - 174 ---- ---- ----  
91 D- 9 Y 0 - 74 75 - 99 >/= 100 ----  
10-19 Y 0 - 89 90 - 129 >/= 130 ----  
20-24 Y 0 - 114 115 - 149 >/= 150 ----  
>24 Y 0 - 149 150 - 199 200- 499 >/= 500  
Venipuncture immediately after or during the administration of   
Metamizole may lead to falsely low results. Testing should be   
performed immediately prior to Metamizole dosing.   
   
                                                            Performed By: #### 2  
4331-1 ####  
ZARATE ARNULFO ([id number])  
[facility] ([facility])  
[address] [place] [address]   
   
                                                    PSA, TOTAL AND FREEon 2024   
   
                      Free PSA [Mass/Vol] 0.7 ng/mL  Normal                [facility]  
   
                                        Comment on above:   Performed By: #### P  
SAFT ####  
[facility] ([id number])  
[address] [place] [address]   
   
                                                    Free PSA/Total PSA   
[Mass fraction] 13 %            Normal                          [facility]  
   
                                        Comment on above:   Result Comment: INTE  
RPRETIVE INFORMATION: Prostate Specific   
Antigen, Free  
Percentage  
[facility] uses the Roche Free PSA electrochemiluminescent immunoassay  
method in conjunction with the Roche PSA electrochemiluminescent  
immunoassay method to determine the free PSA percentage. Values  
obtained with different assay methods should not be used  
interchangeably. The free PSA percentage is an aid in  
distinguishing prostate cancer from benign prostatic conditions   
in  
individuals with a prostate age 50 years and older with a total  
PSA between 3 and 10 ng/mL and negative digital rectal   
examination  
findings. Prostatic biopsy is required for the diagnosis of  
cancer.  
In patients with total PSA concentrations of 4-10 ng/mL, the  
probability of finding prostate cancer on needle biopsy by age in  
years is:  
%fPSA 50-59 60-69 70 or older  
0 - 10% 49% 58% 65%  
11 - 18% 27% 34% 41%  
19 - 25% 18% 24% 30%  
Greater than 25% 9% 12% 16%  
Other factors may help determine the actual risk of prostate  
cancer in individual patients.  
Performed By: [facility]  
[address] [place] [address]  
: Sylvester Noble MD, PhD  
CLIA Number: [id number]   
   
                                                            Performed By: #### P  
SAFT ####  
[facility] ([facility]) ([id number])  
[address] [place] [address]   
   
                                                    Prostate specific Ag   
IA [Mass/Vol]   5.4 ng/mL       High            0.0-4.0         [facility]  
   
                                        Comment on above:   Result Comment: INTE  
RPRETIVE INFORMATION: Prostate Specific   
Antigen  
The Roche PSA electrochemiluminescent immunoassay is used.   
Results  
obtained with different test methods or kits cannot be used  
interchangeably. The Roche PSA method is approved for use as an  
aid in the detection of prostate cancer when used in conjunction  
with a digital rectal exam in individuals with a prostate age 50  
years and older. The Roche PSA is also indicated for the serial  
measurement of PSA to aid in the prognosis and management of  
prostate cancer patients. Elevated PSA concentrations can only  
suggest the presence of prostate cancer until biopsy is   
performed.  
PSA concentrations can also be elevated in benign prostatic  
hyperplasia or inflammatory conditions of the prostate. PSA is  
generally not elevated in healthy individuals or individuals with  
nonprostatic carcinoma.   
   
                                                            Performed By: #### P  
SAFT ####  
[facility] ([facility]) ([id number])  
[address] [place] [address]   
   
                                                    TSH WITH REFLEX TO FREE T4 I  
F ABNORMALon 2024   
   
                      TSH Qn     146.00 m[IU]/L High       0.44-3.98  [facility]  
   
                                        Comment on above:   Order Comment: TSH t  
esting is performed using different   
testing   
methodology at [facility] than at other [facility]. Direct result comparisons should only be made within   
the same method.   
   
                                                            Performed By: #### T  
HYDS ####  
ZARATE ARNULFO ([id number])  
[facility] ([facility])  
[address] [place] [address]   
   
                                                    Thyroxine.freeon 2024   
   
                      Free T4 [Mass/Vol] 1.10 ng/dL Normal     0.61-1.12  [facility]  
   
                                        Comment on above:   Order Comment: Thyro  
xine Free testing is performed using   
different testing methodology at [facility] than at   
other [facility]. Direct result comparisons should only be   
made within the same method.  
Biotin can cause falsely elevated free T4 results. Patients   
taking a Biotin dose of up to 10 mg/day should refrain from   
taking Biotin for 24 hours before sample collection. Patient   
taking a Biotin dose of >10 mg/day should consult with their   
physician or the laboratory before the blood draw.   
   
                                                            Performed By: #### 3  
024-7 ####  
ZARATE ARNULFO ([id number])  
[facility] ([facility])  
[address] [place] [address]   
   
                                                    BASIC METABOLIC PANELon 06-2  
   
   
                                                    Anion gap   
[Moles/Vol]     9 mmol/L        Low             10 - 20         [facility]  
   
                                        Comment on above:   Performed By: #### B  
MP ####  
[address] [address]   
   
                      Calcium [Mass/Vol] 9.3 mg/dL  Normal     8.6 - 10.3 [facility]  
   
                                        Comment on above:   Performed By: #### B  
MP ####  
[address] [address]   
   
                      Chloride [Moles/Vol] 104 mmol/L Normal     98 - 107   [facility]  
   
                                        Comment on above:   Performed By: #### B  
MP ####  
[address] [address]   
   
                                                    Creatinine   
[Mass/Vol]      0.96 mg/dL      Normal          0.50 - 1.30     [facility]  
   
                                        Comment on above:   Performed By: #### B  
MP ####  
[address] [address]   
   
                                                    GFR/1.73 sq   
M.predicted among   
non-blacks MDRD   
(S/P/Bld) [Vol   
rate/Area]      81 mL/min/{1.73_m2} Normal          >90             [facility]  
   
                                        Comment on above:   Result Comment: CALC  
ULATIONS OF ESTIMATED GFR ARE PERFORMED  
USING THE  CKD-EPI STUDY REFIT EQUATION  
WITHOUT THE RACE VARIABLE FOR THE IDMS-TRACEABLE  
CREATININE METHODS.  
https://jasn.asnjournals.org/content/early/ASN.3507372  
988   
   
                                                            Performed By: #### B  
MP ####  
[address] [address]   
   
                      Glucose [Mass/Vol] 100 mg/dL  High       74 - 99    [facility]  
   
                                        Comment on above:   Performed By: #### B  
MP ####  
[address] [address]   
   
                                                    HCO3 (Bld)   
[Moles/Vol]     33 mmol/L       High            21 - 32         [facility]  
   
                                        Comment on above:   Performed By: #### B  
MP ####  
[address] [address]   
   
                                                    Potassium   
[Moles/Vol]     4.2 mmol/L      Normal          3.5 - 5.3       [facility]  
   
                                        Comment on above:   Performed By: #### B  
MP ####  
[address] [address]   
   
                      Sodium [Moles/Vol] 142 mmol/L Normal     136 - 145  [facility]  
   
                                        Comment on above:   Performed By: #### B  
MP ####  
[address] [address]   
   
                                                    Urea nitrogen   
[Mass/Vol]      25 mg/dL        High            6 - 23          [facility]  
   
                                        Comment on above:   Performed By: #### B  
MP ####  
[address] [address]   
   
                                                    PROSTATE SPECIFIC AGon   
-   
   
                                                    Prostate specific Ag   
[Mass/Vol]      4.38 ng/mL      High            0.00 - 4.00     [facility]  
   
                                        Comment on above:   Result Comment: The   
FDA requires that the method used for PSA   
assay be  
reported to the physician. Values obtained with different  
assay methods must not be used interchangeably. This test  
was performed at [facility] using the Access  
Hybritech PSA assay is a two-site immunoenzymatic sandwich  
assay. The assay is approved for measurement of  
prostate-specific antigen (PSA)in serum and may be used  
in conjunction with a digital rectal examination in men  
50 years and older as an aid in detection of prostate  
cancer.  
5-Alpha-reductase inhibitors (e.g. Proscar, Finasteride,  
Avodart, Dutasteride and May) for the treatment of BPH  
have been shown to lower PSA levels by an average of 50%  
after 6 months of treatment.   
   
                                                            Performed By: #### P  
SA ####  
[place] [id number]   
   
                                                    ECG 12 lead (Clinic Performe  
d)on 2023   
   
                                                                  [facility]  
Work Phone:   
[phone]  
   
                                                    Tobacco Screening.on   
023   
   
                                                    Adult depression   
screening assessment No                                              [facility]  
Work Phone:   
[phone]  
   
                                        Fall risk assessment b) One or more fall  
s in   
the last year                                               [facility]  
Work Phone:   
[phone]  
   
                                                    Tobacco use status   
[facility]            b) No                                           [facility]  
Work Phone:   
[phone]  
   
                                                    CBCon 2022   
   
                                                    Erythrocyte   
distribution width   
(RBC) [Ratio]   12.8 %          Normal          11.5 - 14.5     [facility]  
   
                                        Comment on above:   Performed By: #### C  
BC ####  
[address] [address]   
   
                                                    Hematocrit (Bld)   
[Volume fraction] 48.0 %          Normal          41.0 - 52.0     [facility]  
   
                                        Comment on above:   Performed By: #### C  
BC ####  
[address] [address]   
   
                                                    Hemoglobin (Bld)   
[Mass/Vol]      15.2 g/dL       Normal          13.5 - 17.5     [facility]  
   
                                        Comment on above:   Performed By: #### C  
BC ####  
[address] [address]   
   
                                                    MCHC (RBC)   
[Mass/Vol]      31.7 g/dL       Low             32.0 - 36.0     [facility]  
   
                                        Comment on above:   Performed By: #### C  
BC ####  
[address] [address]   
   
                                                    MCV (RBC) [Entitic   
vol]            92 fL           Normal          80 - 100        [facility]  
   
                                        Comment on above:   Performed By: #### C  
BC ####  
[address] [address]   
   
                                                    Platelets (Bld)   
[#/Vol]         202 10*3/uL     Normal          150 - 450       [facility]  
   
                                        Comment on above:   Performed By: #### C  
BC ####  
[address] [address]   
   
                      RBC        5.20 x10E12/L Normal     4.50 - 5.90 [facility]  
   
                                        Comment on above:   Performed By: #### C  
BC ####  
[address] [address]   
   
                      WBC (Bld) [#/Vol] 5.9 10*3/uL Normal     4.4 - 11.3 [facility]  
   
                                        Comment on above:   Performed By: #### C  
BC ####  
[address] [address]   
   
                                                    COMPREHENSIVE PANELon -  
   
   
                      Albumin [Mass/Vol] 3.9 g/dL   Normal     3.4 - 5.0  [facility]  
   
                                        Comment on above:   Performed By: #### C  
MP ####  
[address] [address]   
   
                                                    ALP [Catalytic   
activity/Vol]   52 U/L          Normal          33 - 136        [facility]  
   
                                        Comment on above:   Performed By: #### C  
MP ####  
[address] [address]   
   
                                                    ALT [Catalytic   
activity/Vol]   11 U/L          Normal          10 - 52         [facility]  
   
                                        Comment on above:   Result Comment: Karley  
ents treated with Sulfasalazine may   
generate  
falsely decreased results for ALT.   
   
                                                            Performed By: #### C  
MP ####  
[address] [address]   
   
                                                    Anion gap   
[Moles/Vol]     11 mmol/L       Normal          10 - 20         [facility]  
   
                                        Comment on above:   Performed By: #### C  
MP ####  
[address] [address]   
   
                                                    AST [Catalytic   
activity/Vol]   13 U/L          Normal          9 - 39          [facility]  
   
                                        Comment on above:   Performed By: #### C  
MP ####  
[address] [address]   
   
                      Bilirubin [Mass/Vol] 0.7 mg/dL  Normal     0.0 - 1.2  [facility]  
   
                                        Comment on above:   Performed By: #### C  
MP ####  
[address] [address]   
   
                      Calcium [Mass/Vol] 8.6 mg/dL  Normal     8.6 - 10.3 [facility]  
   
                                        Comment on above:   Performed By: #### C  
MP ####  
[address] [address]   
   
                      Chloride [Moles/Vol] 107 mmol/L Normal     98 - 107   [facility]  
   
                                        Comment on above:   Performed By: #### C  
MP ####  
[address] [address]   
   
                                                    Creatinine   
[Mass/Vol]      0.91 mg/dL      Normal          0.50 - 1.30     [facility]  
   
                                        Comment on above:   Performed By: #### C  
MP ####  
[address] [address]   
   
                                                    GFR/1.73 sq   
M.predicted among   
non-blacks MDRD   
(S/P/Bld) [Vol   
rate/Area]      87 mL/min/{1.73_m2} Normal          >90             [facility]  
   
                                        Comment on above:   Result Comment: CALC  
ULATIONS OF ESTIMATED GFR ARE PERFORMED  
USING THE  CKD-EPI STUDY REFIT EQUATION  
WITHOUT THE RACE VARIABLE FOR THE IDMS-TRACEABLE  
CREATININE METHODS.  
https://jasn.asnjournals.org/content/early/ASN.2593295  
988   
   
                                                            Performed By: #### C  
MP ####  
[address] [address]   
   
                      Glucose [Mass/Vol] 87 mg/dL   Normal     74 - 99    [facility]  
   
                                        Comment on above:   Performed By: #### C  
MP ####  
[address] [address]   
   
                                                    HCO3 (Bld)   
[Moles/Vol]     30 mmol/L       Normal          21 - 32         [facility]  
   
                                        Comment on above:   Performed By: #### C  
MP ####  
[address] [address]   
   
                                                    Potassium   
[Moles/Vol]     3.8 mmol/L      Normal          3.5 - 5.3       [facility]  
   
                                        Comment on above:   Performed By: #### C  
MP ####  
[address] [address]   
   
                      Protein [Mass/Vol] 6.5 g/dL   Normal     6.4 - 8.2  [facility]  
   
                                        Comment on above:   Performed By: #### C  
MP ####  
[address] [address]   
   
                      Sodium [Moles/Vol] 144 mmol/L Normal     136 - 145  [facility]  
   
                                        Comment on above:   Performed By: #### C  
MP ####  
[address] [address]   
   
                                                    Urea nitrogen   
[Mass/Vol]      20 mg/dL        Normal          6 - 23          [facility]  
   
                                        Comment on above:   Performed By: #### C  
MP ####  
[address] [address]   
   
                                                    HEMOGLOBIN A1Con 2022   
   
                      Glucose [Mass/Vol] 128 mg/dL  Normal                [facility]  
   
                                        Comment on above:   Performed By: #### H  
BA1E ####  
[address] [address]   
   
                                                    HbA1c (Bld) [Mass   
fraction]       6.1 %           Abnormal                        [facility]  
   
                                        Comment on above:   Result Comment: Diag  
nosis of Diabetes-Adults  
Non-Diabetic: < or = 5.6%  
Increased risk for developing diabetes: 5.7-6.4%  
Diagnostic of diabetes: > or = 6.5%  
.  
Monitoring of Diabetes  
Age (y) Therapeutic Goal (%)  
Adults: >18 <7.0  
Pediatrics: 13-18 <7.5  
7-12 <8.0  
0- 6 7.5-8.5  
American Diabetes Association. Diabetes Care 33(S1), 2010.   
   
                                                            Performed By: #### H  
BA1E ####  
[address] [address]   
   
                                                    Hemoglobin A1Con 2022   
   
                      Glucose [Mass/Vol] 128 mg/dL                        [facility]  
Work Phone:   
[phone]  
  
   
                                                    HbA1c (Bld) [Mass   
fraction]       6.1 %           Abnormal                        [facility]  
Work Phone:   
[phone]  
   
                                        Comment on above:   Diagnosis of Diabete  
s-Adults Non-Diabetic: < or = 5.6%   
Increased   
risk for developing diabetes: 5.7-6.4% Diagnostic of diabetes: >   
or = 6.5%. Monitoring of Diabetes Age (y) Therapeutic Goal (%)   
Adults: >18 <7.0 Pediatrics: 13-18 <7.5 7-12 <8.0 0- 6 7.5-8.5   
American Diabetes Association. Diabetes Care 33(S1), 2010.   
   
                                                    LIPID PANEL (CORONARY RISK 2  
)on 2022   
   
                                                    Cholesterol   
[Mass/Vol]      158 mg/dL       Normal          0 - 199         [facility]  
   
                                        Comment on above:   Result Comment: .  
AGE DESIRABLE BORDERLINE HIGH HIGH  
0-19 Y 0 - 169 170 - 199 >/= 200  
20-24 Y 0 - 189 190 - 224 >/= 225  
>24 Y 0 - 199 200 - 239 >/= 240  
**All ranges are based on fasting samples. Specific  
therapeutic targets will vary based on patient-specific  
cardiac risk.  
.  
Pediatric guidelines reference:Pediatrics 2011, 128(S5).  
Adult guidelines reference: NCEP ATPIII Guidelines,  
AMINAH 2001, 258:2486-97  
.  
Venipuncture immediately after or during the  
administration of Metamizole may lead to falsely  
low results. Testing should be performed immediately  
prior to Metamizole dosing.   
   
                                                            Performed By: #### L  
IPID ####  
[address] [address]   
   
                                                    Cholesterol in HDL   
[Mass/Vol]      51.0 mg/dL      Normal                          [facility]  
   
                                        Comment on above:   Result Comment: .  
AGE VERY LOW LOW NORMAL HIGH  
0-19 Y < 35 < 40 40-45 ----  
20-24 Y ---- < 40 >45 ----  
>24 Y ---- < 40 40-60 >60  
.   
   
                                                            Performed By: #### L  
IPID ####  
[address] [address]   
   
                                                    Cholesterol in LDL   
[Mass/Vol]      79 mg/dL        Normal          0 - 99          [facility]  
   
                                        Comment on above:   Result Comment: .  
NEAR BORD  
AGE DESIRABLE OPTIMAL HIGH HIGH VERY HIGH  
0-19 Y 0 - 109 --- 110-129 >/= 130 ----  
20-24 Y 0 - 119 --- 120-159 >/= 160 ----  
>24 Y 0 - 99 100-129 130-159 160-189 >/=190  
.   
   
                                                            Performed By: #### L  
IPID ####  
[address] [address]   
   
                                                    Cholesterol in VLDL   
[Mass/Vol]      28 mg/dL        Normal          0 - 40          [facility]  
   
                                        Comment on above:   Performed By: #### L  
IPID ####  
[address] [address]   
   
                                                    Cholesterol.total/Ch  
olesterol in HDL   
[Mass ratio]    3.1 {ratio}     Normal                          [facility]  
   
                                        Comment on above:   Result Comment: REF   
VALUES  
DESIRABLE < 3.4  
HIGH RISK > 5.0   
   
                                                            Performed By: #### L  
IPID ####  
[address] [address]   
   
                                                    Triglyceride   
[Mass/Vol]      138 mg/dL       Normal          0 - 149         [facility]  
   
                                        Comment on above:   Result Comment: .  
AGE DESIRABLE BORDERLINE HIGH HIGH VERY HIGH  
0 D-90 D 19 - 174 ---- ---- ----  
91 D- 9 Y 0 - 74 75 - 99 >/= 100 ----  
10-19 Y 0 - 89 90 - 129 >/= 130 ----  
20-24 Y 0 - 114 115 - 149 >/= 150 ----  
>24 Y 0 - 149 150 - 199 200- 499 >/= 500  
.  
Venipuncture immediately after or during the  
administration of Metamizole may lead to falsely  
low results. Testing should be performed immediately  
prior to Metamizole dosing.   
   
                                                            Performed By: #### L  
IPID ####  
[address] [address]   
   
                                                    Laboratory - Chemistry and C  
hemistry - challengeon 2022   
   
                                                    Albumin BCP dye   
[Mass/Vol]      3.9 g/dL                        3.4 - 5.0       [facility]  
Work Phone:   
[phone]  
33  
   
                                                    ALP [Catalytic   
activity/Vol]   52 U/L                          33 - 136        [facility]  
Work Phone:   
[phone]  
33  
   
                                                    ALT With P-5'-P   
[Catalytic   
activity/Vol]   11 U/L                          10 - 52         [facility]  
Work Phone:   
[phone]  
33  
   
                                        Comment on above:   Patients treated wit  
h Sulfasalazine may generate falsely   
decreased results for ALT.   
   
                                                    Anion gap   
[Moles/Vol]     11 mmol/L                       10 - 20         [facility]  
Work Phone:   
[phone]  
33  
   
                                                    AST With P-5'-P   
[Catalytic   
activity/Vol]   13 U/L                          9 - 39          [facility]  
Work Phone:   
[phone]  
33  
   
                      Bilirubin [Mass/Vol] 0.7 mg/dL             0.0 - 1.2  [facility]  
Work Phone:   
[phone]  
33  
   
                      Calcium [Mass/Vol] 8.6 mg/dL             8.6 - 10.3 [facility]  
Work Phone:   
[phone]  
33  
   
                      Chloride [Moles/Vol] 107 mmol/L            98 - 107   [facility]  
Work Phone:   
[phone]  
33  
   
                      CO2 [Moles/Vol] 30 mmol/L             21 - 32    [facility]  
Work Phone:   
[phone]  
33  
   
                                                    Creatinine   
[Mass/Vol]      0.91 mg/dL                      See Below       [facility]  
Work Phone:   
[phone]  
33  
   
                                        Comment on above:   Reference Range: 0.5  
0 - 1.30   
   
                      Glucose [Mass/Vol] 87 mg/dL              74 - 99    [facility]  
Work Phone:   
[phone]  
33  
   
                                                    Potassium   
[Moles/Vol]     3.8 mmol/L                      3.5 - 5.3       [facility]  
Work Phone:   
[phone]  
33  
   
                      Protein [Mass/Vol] 6.5 g/dL              6.4 - 8.2  [facility]  
Work Phone:   
[phone]  
33  
   
                      Sodium [Moles/Vol] 144 mmol/L            136 - 145  [facility]  
Work Phone:   
[phone]  
33  
   
                                                    Urea nitrogen   
[Mass/Vol]      20 mg/dL                        6 - 23          [facility]  
Work Phone:   
[phone]  
33  
   
                                                    Laboratory - Hematology and   
Cell countson 2022   
   
                                                    Erythrocyte   
distribution width   
(RBC) [Ratio]   12.8 %                          See Below       [facility]  
Work Phone:   
[phone]  
33  
   
                                        Comment on above:   Reference Range: 11.  
5 - 14.5   
   
                                                    Hematocrit (Bld)   
[Volume fraction] 48.0 %                          See Below       [facility]  
Work Phone:   
[phone]  
33  
   
                                        Comment on above:   Reference Range: 41.  
0 - 52.0   
   
                                                    Hemoglobin (Bld)   
[Mass/Vol]      15.2 g/dL                       See Below       [facility]  
Work Phone:   
[phone]  
33  
   
                                        Comment on above:   Reference Range: 13.  
5 - 17.5   
   
                                                    MCHC (RBC)   
[Mass/Vol]                31.7 g/dL                 below low   
threshold                 See Below                 [facility]  
Work Phone:   
[phone]  
   
                                        Comment on above:   Reference Range: 32.  
0 - 36.0   
   
                                                    MCV (RBC) [Entitic   
vol]            92 fL                           80 - 100        [facility]  
Work Phone:   
[phone]  
33  
   
                                                    Platelets (Bld)   
[#/Vol]         202 10*3/uL                     150 - 450       [facility]  
Work Phone:   
[phone]  
33  
   
                      RBC (Bld) [#/Vol] 5.20 {x10E12/L}            See Below  [facility]  
Work Phone:   
[phone]  
   
                                        Comment on above:   Reference Range: 4.5  
0 - 5.90   
   
                      WBC (Bld) [#/Vol] 5.9 10*3/uL            4.4 - 11.3 [facility]  
Work Phone:   
[phone]  
   
                                                    Lipid Panelon 2022   
   
                                                    Cholesterol   
[Mass/Vol]      158 mg/dL                       0 - 199         [facility]  
Work Phone:   
[phone]  
   
                                        Comment on above:   . AGE DESIRABLE BORD  
CATRACHITA HIGH HIGH 0-19 Y 0 - 169 170 - 199   
>/=   
200 20-24 Y 0 - 189 190 - 224 >/= 225 >24 Y 0 - 199 200 - 239 >/=   
240 **All ranges are based on fasting samples. Specific   
therapeutic targets will vary based on patient-specific cardiac   
risk.. Pediatric guidelines reference:Pediatrics 2011, 128(S5).   
Adult guidelines reference: NCEP ATPIII Guidelines, AMINAH 2001,   
258:2486-97. Venipuncture immediately after or during the   
administration of Metamizole may lead to falsely low results.   
Testing should be performed immediately prior to Metamizole   
dosing.   
   
                                                    Cholesterol in HDL   
[Mass/Vol]      51.0 mg/dL                                      [facility]  
Work Phone:   
[phone]  
   
                                        Comment on above:   . AGE VERY LOW LOW N  
ORMAL HIGH 0-19 Y < 35 < 40 40-45 ---- 20-  
24   
Y ---- < 40 >45 ---- >24 Y ---- < 40 40-60 >60.   
   
                                                    Cholesterol in LDL   
[Mass/Vol]      79 mg/dL                        0 - 99          [facility]  
Work Phone:   
[phone]  
   
                                        Comment on above:   . NEAR BORD AGE LULU  
RABLE OPTIMAL HIGH HIGH VERY HIGH 0-19 Y 0  
 -   
109 --- 110-129 >/= 130 ---- 20-24 Y 0 - 119 --- 120-159 >/= 160   
---- >24 Y 0 - 99 100-129 130-159 160-189 >/=190.   
   
                                                    Cholesterol.total/Ch  
olesterol in HDL   
[Mass ratio]    3.1 {ratio}                                     [facility]  
Work Phone:   
[phone]  
   
                                        Comment on above:   REF VALUESDESIRABLE   
< 3.4HIGH RISK > 5.0   
   
                                                    Triglyceride   
[Mass/Vol]      138 mg/dL                       0 - 149         [facility]  
Work Phone:   
[phone]  
   
                                        Comment on above:   . AGE DESIRABLE BORD  
CATRACHITA HIGH HIGH VERY HIGH 0 D-90 D 19 -   
174   
---- ---- ----91 D- 9 Y 0 - 74 75 - 99 >/= 100 ---- 10-19 Y 0 -   
89 90 - 129 >/= 130 ---- 20-24 Y 0 - 114 115 - 149 >/= 150 ----   
>24 Y 0 - 149 150 - 199 200- 499 >/= 500. Venipuncture   
immediately after or during the administration of Metamizole may   
lead to falsely low results. Testing should be performed   
immediately prior to Metamizole dosing.   
   
                      Lipid Panel 28 mg/dL              0 - 40     [facility]  
Work Phone:   
[phone]  
   
                                                    No Panel Informationon    
   
                                 87 {mL/min/1.73m2}            >90        [facility]  
Work Phone:   
[phone]  
   
                                        Comment on above:   CALCULATIONS OF NANCY  
MATED GFR ARE PERFORMED USING THE    
CKD-EPI STUDY REFIT EQUATION WITHOUT THE RACE VARIABLE FOR THE   
IDMS-TRACEABLE CREATININE   
METHODS.https://jasn.asnjournals.org/content/early//ASN  
.0332264433   
   
                                                    PROSTATE SPEC.AG,SCREENon    
   
                                                    PROSTATE   
SPEC.AG,SCREEN  4.25 ng/mL      High            0.00 - 4.00     [facility]  
   
                                        Comment on above:   Result Comment: The   
FDA requires that the method used for PSA   
assay be  
reported to the physician. Values obtained with different  
assay methods must not be used interchangeably. This test  
was performed at [facility] using the Access  
Hybritech PSA assay is a two-site immunoenzymatic sandwich  
assay. The assay is approved for measurement of  
prostate-specific antigen (PSA)in serum and may be used  
in conjunction with a digital rectal examination in men  
50 years and older as an aid in detection of prostate  
cancer.  
5-Alpha-reductase inhibitors (e.g. Proscar, Finasteride,  
Avodart, Dutasteride and May) for the treatment of BPH  
have been shown to lower PSA levels by an average of 50%  
after 6 months of treatment.   
   
                                                            Performed By: #### P  
[facility] ####  
[address] [place] [address]   
   
                                                    Prostate Spec.Ag, Screenon 1  
2022   
   
                                                    Prostate specific Ag   
[Mass/Vol]                4.25 ng/mL                above high   
threshold                 See Below                 [facility]  
Work Phone:   
[phone]  
   
                                        Comment on above:   Reference Range: 0.0  
0 - 4.00The FDA requires that the method   
used   
for PSA assay be reported to the physician. Values obtained with   
different assay methods must not be used interchangeably. This   
testwas performed at [facility] using the Access   
Hybritech PSA assay is a two-site immunoenzymatic sandwich assay.   
The assay is approved for measurement of prostate-specific   
antigen (PSA)in serum and may be used in conjunction with a   
digital rectal examination in men 50 years and older as an aid in   
detection of prostate cancer.5-Alpha-reductase inhibitors (e.g.   
Proscar, Finasteride, Avodart, Dutasteride and May) for the   
treatment of BPH have been shown to lower PSA levels by an   
average of 50% after 6 months of treatment.   
   
                                                    Office Visiton 10-   
   
                                        Follow-up visit     Diagnoses/Problems  
Basal cell carcinoma   
(173.91) (C44.91)  
Orders  
Atrial fibrillation  
Renew: Xarelto 20 MG Oral   
Tablet; Take 1 tablet   
daily  
HTN (hypertension)  
Renew:   
Lisinopril-hydroCHLOROthi  
azide 20-25 MG Oral   
Tablet; TAKE 0.5 TABLET   
Daily  
PMH: History of   
hyperlipidemia  
Renew: Pravastatin Sodium   
40 MG Oral Tablet; Take 1   
tablet daily  
Chief Complaint  
Suture removal  
  
History of Present   
IllnessNo problems   
reported with the suture   
site on his right arm.   
The report did show a   
basal cell cancer that   
was very close to one   
edge. So I told him that   
he will need to watch for   
reoccurrence but it does   
look like I got it all  
  
Active Problems  
Atrial fibrillation   
(427.31) (I48.91)  
Basal cell carcinoma   
(173.91) (C44.91)  
Body mass index (BMI) of   
24.0 to 24.9 in adult   
(V85.1) (Z68.24)  
Centrilobular emphysema   
(492.8) (J43.2)  
Cervical spondylosis   
(721.0) (M47.812)  
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   
(J44.9)  
Dysphagia (787.20)   
(R13.10)  
Dysuria (788.1) (R30.0)  
Encounter for   
immunization (V03.89)   
(Z23)  
Essential tremor (333.1)   
(G25.0)  
Fall (E888.9) (W19.XXXA)  
Familial hyperlipidemia   
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  
Flat foot (734) (M21.40)  
GERD (gastroesophageal   
reflux disease) (530.81)   
(K21.9)  
Glaucoma (365.9) (H40.9)  
History of malignant   
neoplasm of lung (V10.11)   
(Z85.118)  
History of pulmonary   
embolism (V12.55)   
(Z86.711)  
HTN (hypertension)   
(401.9) (I10)  
Hypoxemia (799.02)   
(R09.02)  
Ineffective esophageal   
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  
Lumbar facet arthropathy   
(721.3) (M47.816)  
Medicare annual wellness   
visit, subsequent (V70.0)   
(Z00.00)  
Medication management   
(V58.69) (Z79.899)  
Neck strain (847.0)   
(S16.1XXA)  
Osteoporosis (733.00)   
(M81.0)  
Overweight with body mass   
index (BMI) of 25 to 25.9   
in adult (278.02,V85.21)  
(E66.3,Z68.25)  
Posterior tibial   
tendinitis of right lower   
extremity (726.72)   
(M76.821)  
Prediabetes (790.29)   
(R73.03)  
Preop examination   
(V72.84) (Z01.818)  
Screening PSA (prostate   
specific antigen)   
(V76.44) (Z12.5)  
Seborrheic keratosis   
(702.19) (L82.1)  
UTI (urinary tract   
infection) (599.0)   
(N39.0)  
Past Medical History  
History of abdominal pain   
(V13.89) (Z87.898)  
Resolved Date: 15 Teo   
2020  
History of hiatal hernia   
(V12.79) (Z87.19)  
Resolved Date: 16 Dec   
2021  
History of malignant   
neoplasm of lung (V10.11)   
(Z85.118)  
History of pulmonary   
embolism (V12.55)   
(Z86.711)  
History of Trigger thumb   
(727.03) (M65.319)  
Resolved Date: 2022  
Surgical History  
History of Atrial   
cardioversion  
History of Cardiac   
catheterization with   
stent placement  
History of Colonoscopy  
History of Coronary   
artery bypass graft  
x4  
History of Excision of   
basal cell carcinoma  
History of Kyphoplasty  
History of Lung lobectomy  
History of Shave biopsy  
right and left shoulder,   
earlobe, skin  
Social History  
Former smoker (V15.82)   
(Z87.891)  
No illicit drug use  
No recent foreign travel  
Patient has living will   
(V49.89) (Z78.9)  
Social alcohol use   
(V49.89) (Z78.9)  
Allergies  
No Known Drug Allergies  
Recorded By: Richelle Hendrix; 2019 9:29:58   
AM  
Current Meds  
  
Medication   
NameInstruction  
Albuterol Sulfate    
(90 Base) MCG/ACT   
Inhalation Aerosol   
SolutionINHALE 2 PUFFS   
Every 4 hours PRN  
Calcium Carbonate 500 MG   
CHEWCHEW 1 TABLET Twice   
daily  
Co Q-10 300 MG Oral   
CapsuleTAKE 1 CAPSULE   
Daily  
Latanoprost 0.005 %   
Ophthalmic   
SolutionINSTILL 1 DROP IN   
BOTH EYES AT BEDTIME.  
Levsin/SL 0.125 MG   
Sublingual Tablet   
SublingualPLACE 1 TABLET   
UNDER THE TONGUE 3 TIMES   
DAILY AS NEEDED.  
Lisinopril-hydroCHLOROthi  
azide 20-25 MG Oral   
TabletTAKE 0.5 TABLET   
Daily  
Magnesium Oxide 500 MG   
Oral TabletTAKE 1 TABLET   
DAILY.  
Metoprolol Succinate ER   
25 MG Oral Tablet   
Extended Release 24   
HourTake 1 tablet daily  
Nitroglycerin 0.4 MG   
Sublingual Tablet   
SublingualPLACE 1 TABLET   
UNDER THE TONGUE EVERY 5   
MINUTES FOR UP TO 3 DOSES   
AS NEEDED FOR CHEST   
PAIN.CALL 911 IF PAIN   
PERSISTS.  
OxygenOxygen concentrator   
via NC at 2LPM at hs  
Pantoprazole Sodium 40 MG   
Oral Tablet Delayed   
Releasetake 1 tablet by   
mouth once daily  
Pravastatin Sodium 40 MG   
Oral TabletTake 1 tablet   
daily  
Prolia 60 MG/ML   
Subcutaneous Solution   
Prefilled Syringe1 ml   
subcutaneous q6 months  
Trelegy Ellipta   
100-62.5-25 MCG/INH   
AEPBINHALE 1 PUFFS Daily  
Xarelto 20 MG Oral   
TabletTake 1 tablet daily  
Vitals  
Vital Signs  
Recorded: 2022   
10:46AM  
Heart Rate68  
Iedjzujx397, LUE  
Hmwkmjvks10, LUE  
Height6 ft  
Lzryyu264 lb  
BMI Lxjixatskb57.33 kg/m2  
BSA Calculated2  
Tobacco Useb) No  
Falls Screening (Age   
18+)b) One or more falls   
in the last year  
Physical Exam  
Well-healed incision on   
the right arm. I removed   
all 5 sutures today. No   
special care needed.  
  
Signatures  
Electronically signed by   
: Jake Jones MD; Oct   
31 2022 11:23AM EST   
(Author)            Normal                                     
[place]  
   
                                                    Tobacco Screening.on 10-31-2  
022   
   
                                        Fall risk assessment b) One or more fall  
s in   
the last year                                               [facility]  
Work Phone:   
[phone]  
   
                                                    Tobacco use status   
[facility]            b) No                                           [facility]  
Work Phone:   
[phone]  
   
                                                    No Panel Informationon 10-19  
-2022   
   
                                                                  [facility]  
Work Phone:   
[phone]  
   
                                                    Office Visiton 10-   
   
                                        Follow-up visit     Diagnoses/Problems  
Basal cell carcinoma   
(173.91) (C44.91)  
Osteoporosis (733.00)   
(M81.0)  
Orders  
Basal cell carcinoma  
Surgical Pathology;   
Status:Hold For -   
Specimen/Data Collection;   
Requested  
for:2022;  
Type : Non Biopsy  
Fixative : Formalin  
Site A : Right arm  
Chief Complaint  
BCC removal  
  
History of Present   
IllnessThe lesions I   
removed last time turned   
out to be BCC. So will   
remove the lesion on the   
back of the arm today.   
Healing well and to have   
sutures out of prior   
surgery today. Has   
multiple lesions on the   
back that are flat and   
small  
  
Active Problems  
Atrial fibrillation   
(427.31) (I48.91)  
Basal cell carcinoma   
(173.91) (C44.91)  
Body mass index (BMI) of   
24.0 to 24.9 in adult   
(V85.1) (Z68.24)  
Centrilobular emphysema   
(492.8) (J43.2)  
Cervical spondylosis   
(721.0) (M47.812)  
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   
(J44.9)  
Dysphagia (787.20)   
(R13.10)  
Dysuria (788.1) (R30.0)  
Encounter for   
immunization (V03.89)   
(Z23)  
Essential tremor (333.1)   
(G25.0)  
Fall (E888.9) (W19.XXXA)  
Familial hyperlipidemia   
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  
Flat foot (734) (M21.40)  
GERD (gastroesophageal   
reflux disease) (530.81)   
(K21.9)  
Glaucoma (365.9) (H40.9)  
History of malignant   
neoplasm of lung (V10.11)   
(Z85.118)  
History of pulmonary   
embolism (V12.55)   
(Z86.711)  
HTN (hypertension)   
(401.9) (I10)  
Hypoxemia (799.02)   
(R09.02)  
Ineffective esophageal   
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  
Lumbar facet arthropathy   
(721.3) (M47.816)  
Medicare annual wellness   
visit, subsequent (V70.0)   
(Z00.00)  
Medication management   
(V58.69) (Z79.899)  
Neck strain (847.0)   
(S16.1XXA)  
Osteoporosis (733.00)   
(M81.0)  
Overweight with body mass   
index (BMI) of 25 to 25.9   
in adult (278.02,V85.21)  
(E66.3,Z68.25)  
Posterior tibial   
tendinitis of right lower   
extremity (726.72)   
(M76.821)  
Prediabetes (790.29)   
(R73.03)  
Preop examination   
(V72.84) (Z01.818)  
Screening PSA (prostate   
specific antigen)   
(V76.44) (Z12.5)  
Seborrheic keratosis   
(702.19) (L82.1)  
UTI (urinary tract   
infection) (599.0)   
(N39.0)  
Past Medical History  
History of abdominal pain   
(V13.89) (Z87.898)  
Resolved Date: 15 Teo   
2020  
History of hiatal hernia   
(V12.79) (Z87.19)  
Resolved Date: 16 Dec   
2021  
History of malignant   
neoplasm of lung (V10.11)   
(Z85.118)  
History of pulmonary   
embolism (V12.55)   
(Z86.711)  
History of Trigger thumb   
(727.03) (M65.319)  
Resolved Date: 2022  
Surgical History  
History of Atrial   
cardioversion  
History of Cardiac   
catheterization with   
stent placement  
History of Colonoscopy  
History of Coronary   
artery bypass graft  
x4  
History of Excision of   
basal cell carcinoma  
History of Kyphoplasty  
History of Lung lobectomy  
History of Shave biopsy  
right and left shoulder,   
earlobe, skin  
Social History  
Former smoker (V15.82)   
(Z87.891)  
No illicit drug use  
No recent foreign travel  
Patient has living will   
(V49.89) (Z78.9)  
Social alcohol use   
(V49.89) (Z78.9)  
Allergies  
No Known Drug Allergies  
Recorded By: Richelle Hendrix; 2019 9:29:58   
AM  
Current Meds  
  
Medication   
NameInstruction  
Albuterol Sulfate    
(90 Base) MCG/ACT   
Inhalation Aerosol   
SolutionINHALE 2 PUFFS   
Every 4 hours PRN  
Calcium Carbonate 500 MG   
CHEWCHEW 1 TABLET Twice   
daily  
Co Q-10 300 MG Oral   
CapsuleTAKE 1 CAPSULE   
Daily  
Latanoprost 0.005 %   
Ophthalmic   
SolutionINSTILL 1 DROP IN   
BOTH EYES AT BEDTIME.  
Levsin/SL 0.125 MG   
Sublingual Tablet   
SublingualPLACE 1 TABLET   
UNDER THE TONGUE 3 TIMES   
DAILY AS NEEDED.  
Lisinopril-hydroCHLOROthi  
azide 20-25 MG Oral   
TabletTAKE 0.5 TABLET   
Daily  
Magnesium Oxide 500 MG   
Oral TabletTAKE 1 TABLET   
DAILY.  
Metoprolol Succinate ER   
25 MG Oral Tablet   
Extended Release 24   
HourTake 1 tablet daily  
Nitroglycerin 0.4 MG   
Sublingual Tablet   
SublingualPLACE 1 TABLET   
UNDER THE TONGUE EVERY 5   
MINUTES FOR UP TO 3 DOSES   
AS NEEDED FOR CHEST   
PAIN.CALL 911 IF PAIN   
PERSISTS.  
OxygenOxygen concentrator   
via NC at 2LPM at hs  
Pantoprazole Sodium 40 MG   
Oral Tablet Delayed   
Releasetake 1 tablet by   
mouth once daily  
Pravastatin Sodium 40 MG   
Oral TabletTake 1 tablet   
daily  
Prolia 60 MG/ML   
Subcutaneous Solution   
Prefilled Syringe1 ml   
subcutaneous q6 months  
Trelegy Ellipta   
100-62.5-25 MCG/INH   
AEPBINHALE 1 PUFFS Daily  
Xarelto 20 MG Oral   
TabletTake 1 tablet daily  
Vitals  
Vital Signs  
Recorded: 2022   
09:00AM  
Height6 ft  
Zqrwue563 lb  
BMI Ttsqwdwlni09.46 kg/m2  
BSA Calculated2  
Tobacco Useb) No  
Falls Screening (Age   
18+)b) One or more falls   
in the last year  
Physical Exam  
The sites were removed   
the lesions last week are   
well-healed. All sutures   
removed. On his back he   
has multiple 3 to 5 mm   
pink scaly lesions that   
are similar to the ones I   
removed. At this point we   
are going to watch those   
and if they get any   
larger or more raised   
will remove them. On the   
back of the right arm he   
has a 7 mm scaly pink   
lesion. Again similar to   
the ones I removed. So   
will remove that today   
due to the size.  
  
Procedure  
Excision lower back   
lesion back of right arm  
LA with 2.0 ml 1%   
Xylocaine plain after   
alcohol prep  
Skin Prep chlorhexidine   
4%  
(more content not   
included)...        Normal                                     
[facility]  
   
                                                    Tobacco Screening.on 10-19-2  
022   
   
                                        Fall risk assessment b) One or more fall  
s in   
the last year                                               -[facility]  
Work Phone:   
[phone]  
   
                                                    Tobacco use status   
[facility]            b) No                                           -[facility]  
Work Phone:   
[phone]  
   
                                                    [facility]  
tmenton 10-   
   
                                                    [facility]   
Pathology Department                    Name MIHAELA HEATON  
  
Accession #: [id number]  
Pathologist: ALMA RODRÍGUEZ MD, Board   
Certified  
Dermatopathologist  
Date of Procedure:   
10/19/2022  
Date Received: 10/19/2022  
Date Reported 10/31/2022  
Submitting Physician:   
JAKE JONES MD  
Location: [facility]   
External #  
  
FINAL DIAGNOSIS  
A. SKIN, RIGHT ARM,   
EXCISION:  
--BASAL CELL CARCINOMA,   
SUPERFICIAL TYPE,   
EXTENDING < 0.1 MM FROM   
INKED  
PERIPHERAL MARGIN  
  
  
  
  
  
  
  
Electronically Signed Out   
By ALMA RODRÍGUEZ MD,   
Board Certified  
Dermatopathologist/RUPINDER  
By the signature on this   
report, the individual or   
group listed as making   
the  
Final   
Interpretation/Diagnosis   
certifies that they have   
reviewed this case.  
Diagnostic interpretation   
performed at [facility] [address] [facility] [address]  
  
Clinical History:  
Fixative (A): FORMALIN  
Clinical Diagnosis   
History: Basal cell   
carcinoma - (C44.91)  
Specimens Submitted As:  
A: RIGHT ARM  
Gross Description:  
Received in formalin,   
labeled with the   
patient's name and   
hospital number, is  
an unoriented elliptical   
segment of skin with   
subcutaneous tissue   
measuring 1.4  
x 1.1 x 0.6 cm. On the   
skin surface is an ovoid,   
white, flat area   
measuring  
0.7 x 0.6 cm which   
extends within 0.1 cm to   
the nearest margin. The   
deep and  
lateral margins are   
inked. The specimen is   
sectioned and entirely   
submitted in  
2 cassettes.  
DJO  
  
Summary of Cassettes:  
Specimen Label Site  
A 1 tips  
2 body of ellipse  
djo/10/25/2022  
[facility]  
Department of Pathology  
[address] [place] [address]  
   
                                        Comment on above:   Performed By: #### U  
[facility] ####  
[facility]  
[address] [place] [address]   
   
                                                    ANKLE 2 VIEWSon 10-   
   
                                        ANKLE 2 VIEWS       MRN: [id number]  
Patient Name: MIHAELA HEATON  
  
STUDY:  
ANKLE 2 VIEWS; 10/10/2022   
3:14 pm  
  
INDICATION:  
foot/ankle pain.  
  
COMPARISON:  
None.  
  
ACCESSION NUMBER(S):  
[id number]  
  
ORDERING CLINICIAN:  
MONSE ART  
  
FINDINGS:  
Right ankle, three views  
  
There is no fracture.   
There is no dislocation.   
No significant  
degenerative changes   
seen. The ankle mortise   
is maintained. No soft  
tissue abnormality seen.  
  
IMPRESSION:  
Unremarkable radiographs   
of the right ankle  
  
Electronically signed by:   
YOSVANY SHETH MD Located within [facility]  
   
                                                    FOOT COMPLETE, MIN 3 VIEWSon  
 10-   
   
                                                    FOOT COMPLETE, MIN 3   
VIEWS                                   MRN: [id number]  
Patient Name: MIHAELA HEATON  
  
STUDY:  
FOOT; COMPLETE, MIN 3   
VIEWS; 10/10/2022 3:14 pm  
  
INDICATION:  
foot/ankle pain.  
  
COMPARISON:  
None.  
  
ACCESSION NUMBER(S):  
[id number]  
  
ORDERING CLINICIAN:  
MONSE ART  
  
FINDINGS:  
Right foot, three views  
  
There is no fracture.   
There is no dislocation.   
No degenerative  
changes seen.  
  
IMPRESSION:  
Normal radiographs of the   
right foot  
Electronically signed by:   
YOSVANY SHETH MD Located within [facility]  
   
                                                    Radiologyon 10-   
   
                                        XR Ankle 2 Views    Please click on the   
link   
to view the study images Normal                                  [facility]  
Work Phone:   
[phone]  
   
                      XR Ankle 2 Views            Normal                LULY-Chelsea hathwaay   
[facility]  
Work Phone:   
[phone]  
   
                                        XR Foot 3 Views     Please click on the   
link   
to view the study images Normal                                  [place]   
[facility]  
Work Phone:   
[phone]  
   
                      XR Foot 3 Views            Normal                LULY-Steve mejia   
[facility]  
Work Phone:   
[phone]  
   
                                                    No Panel Informationon 10-03  
-2022   
   
                                                                  [place]   
[facility]  
Work Phone:   
[phone]  
   
                                                    Office Visiton 10-   
   
                                        Follow-up visit     Diagnoses/Problems  
Encounter for   
immunization (V03.89)   
(Z23)  
Squamous cell carcinoma   
in situ (SCCIS) of skin   
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  
Orders  
Encounter for   
immunization  
Administer: Fluzone   
High-Dose Quadrivalent   
0.7 ML Intramuscular   
Suspension Prefilled  
Syringe; INJECT 0.7 ML   
Intramuscular; To Be   
Done: 2022  
HTN (hypertension)  
Renew: Metoprolol   
Succinate ER 25 MG Oral   
Tablet Extended Release   
24 Hour; Take 1  
tablet daily  
Squamous cell carcinoma   
in situ (SCCIS) of skin   
of back  
Surgical Pathology;   
Status:Hold For -   
Specimen/Data Collection;   
Requested  
for:2022;  
Type : Non Biopsy  
Fixative : Formalin  
Site B : lower back  
Type : Non Biopsy  
Fixative : Formalin  
Site A : upper back  
Follow-up visit in 2   
weeks Outpatient   
Follow-up Status: Hold   
For - Scheduling  
Requested for: 2022  
Provider Impressions  
All of the lesions could   
be irritated SK's but I   
am concerned about SCC   
with all and the lower   
one on the back also for   
melanoma. So will excise   
2 of them today on the   
back and send for   
pathology. If they are in   
deed SCC will excise   
another smaller one on   
the back and the one on   
the arm when he returns   
to remove sutures.  
  
Chief Complaint  
skin lesions  
  
  
History of Present   
Illness1 lesion on right   
upper arm and 2 on back   
for a long time  
Change not note  
Itchy at time  
No bleeding or oozing  
  
Active Problems  
Atrial fibrillation   
(427.31) (I48.91)  
Basal cell carcinoma   
(173.91) (C44.91)  
Body mass index (BMI) of   
24.0 to 24.9 in adult   
(V85.1) (Z68.24)  
Centrilobular emphysema   
(492.8) (J43.2)  
Cervical spondylosis   
(721.0) (M47.812)  
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   
(J44.9)  
Dysphagia (787.20)   
(R13.10)  
Dysuria (788.1) (R30.0)  
Encounter for   
immunization (V03.89)   
(Z23)  
Essential tremor (333.1)   
(G25.0)  
Fall (E888.9) (W19.XXXA)  
Familial hyperlipidemia   
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  
GERD (gastroesophageal   
reflux disease) (530.81)   
(K21.9)  
Glaucoma (365.9) (H40.9)  
History of malignant   
neoplasm of lung (V10.11)   
(Z85.118)  
History of pulmonary   
embolism (V12.55)   
(Z86.711)  
HTN (hypertension)   
(401.9) (I10)  
Hypoxemia (799.02)   
(R09.02)  
Ineffective esophageal   
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  
Lumbar facet arthropathy   
(721.3) (M47.816)  
Medicare annual wellness   
visit, subsequent (V70.0)   
(Z00.00)  
Medication management   
(V58.69) (Z79.899)  
Neck strain (847.0)   
(S16.1XXA)  
Osteoporosis (733.00)   
(M81.0)  
Overweight with body mass   
index (BMI) of 25 to 25.9   
in adult (278.02,V85.21)  
(E66.3,Z68.25)  
Prediabetes (790.29)   
(R73.03)  
Preop examination   
(V72.84) (Z01.818)  
Screening PSA (prostate   
specific antigen)   
(V76.44) (Z12.5)  
Seborrheic keratosis   
(702.19) (L82.1)  
UTI (urinary tract   
infection) (599.0)   
(N39.0)  
Past Medical History  
History of abdominal pain   
(V13.89) (Z87.898)  
Resolved Date: 15 Teo   
2020  
History of hiatal hernia   
(V12.79) (Z87.19)  
Resolved Date: 16 Dec   
2021  
History of malignant   
neoplasm of lung (V10.11)   
(Z85.118)  
History of pulmonary   
embolism (V12.55)   
(Z86.711)  
History of Trigger thumb   
(727.03) (M65.319)  
Resolved Date: 2022  
Surgical History  
History of Atrial   
cardioversion  
History of Cardiac   
catheterization with   
stent placement  
History of Colonoscopy  
History of Coronary   
artery bypass graft  
x4  
History of Excision of   
basal cell carcinoma  
History of Kyphoplasty  
History of Lung lobectomy  
History of Shave biopsy  
right and left shoulder,   
earlobe, skin  
Social History  
Former smoker (V15.82)   
(Z87.891)  
No illicit drug use  
No recent foreign travel  
Patient has living will   
(V49.89) (Z78.9)  
Social alcohol use   
(V49.89) (Z78.9)  
Allergies  
No Known Drug Allergies  
Recorded By: Richelle Hendrix; 2019 9:29:58   
AM  
Current Meds  
  
Medication   
NameInstruction  
Albuterol Sulfate    
(90 Base) MCG/ACT   
Inhalation Aerosol   
SolutionINHALE 2 PUFFS   
Every 4 hours PRN  
Calcium Carbonate 500 MG   
CHEWCHEW 1 TABLET Twice   
daily  
Co Q-10 300 MG Oral   
CapsuleTAKE 1 CAPSULE   
Daily  
Latanoprost 0.005 %   
Ophthalmic   
SolutionINSTILL 1 DROP IN   
BOTH EYES AT BEDTIME.  
Levsin/SL 0.125 MG   
Sublingual Tablet   
SublingualPLACE 1 TABLET   
UNDER THE TONGUE 3 TIMES   
DAILY AS NEEDED.  
Lisinopril-hydroCHLOROthi  
azide 20-25 MG Oral   
TabletTAKE 0.5 TABLET   
Daily  
Magnesium Oxide 500 MG   
Oral TabletTAKE 1 TABLET   
DAILY.  
Metoprolol Succinate ER   
25 MG Oral Tablet   
Extended Release 24   
HourTake 1 tablet daily  
Nitroglycerin 0.4 MG   
Sublingual Tablet   
SublingualPLACE 1 TABLET   
UNDER THE TONGUE EVERY 5   
MINUTES FOR UP TO 3 DOSES   
AS NEEDED FOR CHEST   
PAIN.CALL 911 IF PAIN   
PERSISTS.  
OxygenOxygen concentrator   
via NC at 2LPM at hs  
Pantoprazole Sodium 40 MG   
Oral Tablet Delayed   
Releasetake 1 tablet by   
mouth once daily  
Pravastatin Sodium 40 MG   
Oral TabletTake 1 tablet   
daily  
Prolia 60 MG/ML   
Subcutaneous Solution   
Prefilled Syringe1 ml   
subcutaneous q6 months  
Trelegy Ellipta   
100-62.5-25 MCG/INH   
Inhalation Aerosol Powder   
Breath ActivatedINHALE 1   
PUFFS Daily  
Xarelto 20 MG Oral   
TabletTake 1 tablet daily  
Vitals  
Vital Signs  
Recorded: 2022   
02:25PM  
Heart Rate64  
Sy (more content not   
included)...        Normal                                     
[facility]  
   
                                                    Tobacco Screening.on 10-03-2  
022   
   
                                        Fall risk assessment b) One or more fall  
s in   
the last year                                               -[facility]  
Work Phone:   
[phone]  
   
                                                    Tobacco use status   
[facility]            b) No                                           -[facility]  
Work Phone:   
[phone]  
   
                                                    [facility]  
tmenton 10-   
   
                                                    [facility]   
Pathology Department                    Name MIHAELA HEATON  
  
Accession #: [id number]  
Pathologist: ALMA RODRÍGUEZ MD, Board   
Certified  
Dermatopathologist  
Date of Procedure:   
10/3/2022  
Date Received: 10/4/2022  
Date Reported 10/10/2022  
Submitting Physician:   
JAKE JONES MD  
Location: [facility] Other   
External #  
  
FINAL DIAGNOSIS  
A. SKIN, UPPER BACK,   
EXCISION:  
--BASAL CELL CARCINOMA,   
SUPERFICIAL TYPE, NOT   
PRESENT AT INKED MARGINS   
IN  
TISSUE PLANES EXAMINED  
--INCIDENTAL SUPRABASILAR   
ACANTHOLYSIS OF UNCERTAIN   
CLINICAL SIGNIFICANCE  
B. SKIN, LOWER BACK,   
EXCISION:  
--BASAL CELL CARCINOMA,   
SUPERFICIAL TYPE, NOT   
PRESENT AT INKED MARGINS   
IN  
TISSUE PLANES EXAMINED  
  
  
  
  
  
  
  
Electronically Signed Out   
By ALMA RODRÍGUEZ MD,   
Board Certified  
Dermatopathologist/RUPINDER  
By the signature on this   
report, the individual or   
group listed as making   
the  
Final   
Interpretation/Diagnosis   
certifies that they have   
reviewed this case.  
Diagnostic interpretation   
performed at [facility] [address] [facility] [address]  
  
Clinical History:  
Fixative (A): FORMALIN  
Fixative (B): FORMALIN  
Clinical Diagnosis   
History: Squamous cell   
carcinoma in situ (SCCIS)   
of skin of  
back - (D04.5)  
Specimens Submitted As:  
A: UPPER BACK  
B: LOWER BACK  
Gross Description:  
A: Received in formalin,   
labeled with the   
patient's name and   
hospital number  
and  left upper back , is   
an unoriented, elliptical   
segment of skin with  
subcutaneous tissue   
measuring 1.4 x 0.8 x 0.5   
cm. The skin surface is  
diffusely granular. The   
deep and lateral margins   
are inked green. The   
specimen  
is sectioned and entirely   
submitted in 3 cassettes.  
JL  
B: Received in formalin,   
labeled with the   
patient's name and   
hospital number  
and  left lower back , is   
an unoriented, elliptical   
segment of skin with  
subcutaneous tissue   
measuring 2.0 x 1.4 x 0.8   
cm. The skin surface is  
diffusely granular. The   
deep and lateral margins   
are inked green. The   
specimen  
is sectioned and entirely   
submitted in 4 cassettes.  
JLM  
  
Summary of Cassettes:  
Specimen Label Site  
A 1 tips  
2-3 body of ellipse  
B 1 tips  
2-4 body of ellipse  
jlm/10/5/2022  
[facility]  
Department of Pathology  
[address] [id number] Normal                                  [facility]  
   
                                        Comment on above:   Performed By: #### U  
[facility] ####  
[facility] Department  
[address] [id number]   
   
                                                    Established Visit (Pain Medi  
cine)on 2022   
   
                                                    Established Visit   
(Pain Medicine)                         Diagnoses/Problems  
Cervical spondylosis   
(721.0) (M47.812)  
Neck strain (847.0)   
(S16.1XXA)  
Patient   
Discussion/Summary  
I discussed with the   
patient the likely   
etiology of his symptoms,   
as well as potential   
treatment options  
I addressed options with   
him and since he is doing   
much better and is no   
longer having any   
bothersome symptoms and   
has no deficits on exam I   
think we can hold off on   
further interventions and   
imaging. If the symptoms   
were to return that we   
could consider a CT scan   
or an MRI. I do not think   
that is necessary at this   
time. I advised him with   
regard to some home   
exercises and I will see   
him for follow-up on a as   
needed basis.  
  
Chief Complaint  
F/U PREDNISONE, THE   
MEDICATION TOOK AWAY HIS   
NECK PAIN AND HEADACHES,   
NOW HIS NECK IS POPPING   
AND CRACKING WHEN HE   
MOVES IT NO PAIN, HE   
WANTS TO DISCUSS IF THIS   
IS NORMAL, SCORE TODAY   
0/10  
This is a 77-year-old   
male here for a follow-up   
appointment for chief   
complaint of right-sided   
neck pain. He reports   
that since his last visit   
the symptoms have   
improved and the pain is   
now gone. He thinks the   
prednisone helped a lot.   
He will get some popping   
when he turns his neck to   
the right side but he has   
no numbness, tingling,   
weakness, problems with   
his balance, issues with   
bladder or bowel control,   
or pain. He denies any   
new neurologic symptoms.   
He reports he is   
functioning normally. He   
is not requiring any   
medications for this.  
The patient's past   
medical, social, and   
family history along with   
medications and allergies   
are available and were   
reviewed.  
  
Adult Risk Screening  
  
Living Will.  
Living Will: Living will   
on file.  
Healthcare POA: Health   
care proxy on file.  
Domestic Violence Screen:   
Does not feel threatened   
or abused physically,   
emotionally or sexually.   
Do you feel UNSAFE?  
The patient feels safe in   
the home.  
Depression/Suicide   
Screening:  
During the past 2 weeks,   
the patient has not felt   
down, depressed or   
hopeless.  
During the past 2 weeks,   
the patient has not felt   
little interest or   
pleasure in doing things.  
  
History of Present   
Illness  
Pain Location: Neck Pain.  
Pain Quality: CRACKING,   
POPPING. Improved  
Exacerbating Factors:   
rest, motion, repetitive   
motion and ADL.  
Alleviating Factors:   
Medications,   
Repositioning.  
24 Hour Behavior:  
Symptoms are better in   
the am.  
Symptoms are better as   
the day progresses.  
Symptoms are better in   
the pm.  
Symptoms are better lying   
down.  
  
Active Problems  
Atrial fibrillation   
(427.31) (I48.91)  
Basal cell carcinoma   
(173.91) (C44.91)  
Body mass index (BMI) of   
24.0 to 24.9 in adult   
(V85.1) (Z68.24)  
Centrilobular emphysema   
(492.8) (J43.2)  
Cervical spondylosis   
(721.0) (M47.812)  
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   
(J44.9)  
Dysphagia (787.20)   
(R13.10)  
Dysuria (788.1) (R30.0)  
Encounter for   
immunization (V03.89)   
(Z23)  
Essential tremor (333.1)   
(G25.0)  
Fall (E888.9) (W19.XXXA)  
Familial hyperlipidemia   
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  
GERD (gastroesophageal   
reflux disease) (530.81)   
(K21.9)  
Glaucoma (365.9) (H40.9)  
History of malignant   
neoplasm of lung (V10.11)   
(Z85.118)  
History of pulmonary   
embolism (V12.55)   
(Z86.711)  
HTN (hypertension)   
(401.9) (I10)  
Hypoxemia (799.02)   
(R09.02)  
Ineffective esophageal   
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  
Lumbar facet arthropathy   
(721.3) (M47.816)  
Medicare annual wellness   
visit, subsequent (V70.0)   
(Z00.00)  
Medication management   
(V58.69) (Z79.899)  
Neck strain (847.0)   
(S16.1XXA)  
Osteoporosis (733.00)   
(M81.0)  
Overweight with body mass   
index (BMI) of 25 to 25.9   
in adult (278.02,V85.21)  
(E66.3,Z68.25)  
Prediabetes (790.29)   
(R73.03)  
Preop examination   
(V72.84) (Z01.818)  
Screening PSA (prostate   
specific antigen)   
(V76.44) (Z12.5)  
Seborrheic keratosis   
(702.19) (L82.1)  
UTI (urinary tract   
infection) (599.0)   
(N39.0)  
Past Medical History  
History of abdominal pain   
(V13.89) (Z87.898)  
Resolved Date: 15 Teo   
2020  
History of hiatal hernia   
(V12.79) (Z87.19)  
Resolved Date: 16 Dec   
2021  
History of malignant   
neoplasm of lung (V10.11)   
(Z85.118)  
History of pulmonary   
embolism (V12.55)   
(Z86.711)  
History of Trigger thumb   
(727.03) (M65.319)  
Resolved Date: 2022  
Surgical History  
History of Atrial   
cardioversion  
History of Cardiac   
catheterization with   
stent placement  
History of Colonoscopy  
History of Coronary   
artery bypass graft  
x4  
History of Excision of   
basal cell carcinoma  
History of Kyphoplasty  
History of Lung lobectomy  
History of Shave biopsy  
right and left shoulder,   
earlobe, skin  
Family History  
Family history of acute   
myocardial infarction   
(V17.3) (Z82.49)  
Family history of cardiac   
disorder (V17.49)   
(Z82.49)  
Social History  
Former smoker (V15.82)   
(Z87.891)  
No illicit drug use  
No recent foreign travel  
Patient has living will   
(V49.89) (Z78.9)  
Social alcohol use   
(V49.89) (Z78.9)  
Allergies  
No Known Drug Allergies  
Recorded By: Richelle Hendrix; 2019 9:29:58   
AM  
Current Meds  
  
Medication   
NameInstruction  
Albuterol Sulfate    
(90 Base) MCG/ACT   
Inhalation Aerosol   
SolutionINHALE 2 PUFFS   
Every 4 ho (more content   
not included)...    Normal                                  [facility]   
[facility]  
   
                                                    Cult, Urineon 2022   
   
                                                    Bacteria identified   
Cx Nom (U)                                                      -[facility]  
Work Phone:   
[phone]  
   
                                                    IO UA (automated w/o microsc  
opy)on 2022   
   
                                                    Protein (U)   
[Mass/Vol]      Negative                                        [facility]  
Work Phone:   
[phone]  
33  
   
                                                    IO UA (automated w/o   
microscopy)     Negative                                        [facility]  
Work Phone:   
[phone]  
33  
   
                                                    IO UA (automated w/o   
microscopy)     Normal (0.2-1.0 mg/dl)                                 [facility]  
Work Phone:   
[phone]  
33  
   
                                                    IO UA (automated w/o   
microscopy)     7.0 1                                           [facility]  
Work Phone:   
[phone]  
33  
   
                                                    IO UA (automated w/o   
microscopy)     Trace                                           [facility]  
Work Phone:   
[phone]  
33  
   
                                                    IO UA (automated w/o   
microscopy)     1.030 1                                         [facility]  
Work Phone:   
[phone]  
33  
   
                                                    IO UA (automated w/o   
microscopy)     Clear                                           [facility]  
Work Phone:   
[phone]  
33  
   
                                                    IO UA (automated w/o   
microscopy)     Yellow                                          [facility]  
Work Phone:   
[phone]  
  
   
                                                    Office Visit ([facility])on 2022   
   
                                        Follow-up visit     Diagnoses/Problems  
Dysuria (788.1) (R30.0)  
UTI (urinary tract   
infection) (599.0)   
(N39.0)  
Orders  
Dysuria  
Cult, Urine;   
Status:Active; Requested   
for:2022;  
IO UA (automated w/o   
microscopy);   
Status:Resulted -   
Requires Verification;   
Done:  
2022 11:05AM  
UTI (urinary tract   
infection)  
Start: Ciprofloxacin HCl   
- 500 MG Oral Tablet;   
Take 1 tablet twice daily  
Follow-Up, Recheck   
Outpatient Follow-up   
symptoms not resolving   
completely or  
worsens/changes/concerns   
Status: Hold For -   
Scheduling Requested for:   
2022  
Patient   
Discussion/Summary  
UTI symptoms x 1 weeks  
Urine dip looks OK, but   
will get culture and   
start Cipro  
To call/follow up if not   
improving and resolving   
completely or   
worsens/changes/concerns  
22 - Urine culture   
negative. Advised   
continue Cipro if   
improving, If not   
improving to follow up as   
soon as possible.  
  
  
Chief Complaint  
Pt. c/o burning with   
urination and frequency x   
1 week.  
  
History of Present   
Illness  
Thinks may have UTI.  
Has been having dysuria   
and frequency. Voiding   
small amounts. Urgency.   
Symptoms x about 1 week.  
Started last week after   
he started his steroid   
medication from Dr. Landers. Was taking it for   
his neck pain.  
Reviewed prednisone in   
UpToDate and UTI or   
urological symptoms not   
mentioned.  
No hematuria. No back or   
abdominal pain. No   
testicular tenderness.  
Symptoms unchanged.  
  
Review of Systems  
Review of Systems  
Constitutional: No fever.  
Eye: No recent visual   
problem.  
Respiratory: No shortness   
of breath.  
Cardiovascular: No chest   
pain.  
Gastrointestinal: No   
reflux, No nausea, No   
vomiting, No diarrhea, No   
constipation, No   
heartburn, No abdominal   
pain.  
Genitourinary: Dysuria,   
frequency, urgency  
Neurologic: No headache.  
  
Active Problems  
Atrial fibrillation   
(427.31) (I48.91)  
Basal cell carcinoma   
(173.91) (C44.91)  
Body mass index (BMI) of   
24.0 to 24.9 in adult   
(V85.1) (Z68.24)  
CAD (coronary artery   
disease) (414.00)   
(I25.10)  
Centrilobular emphysema   
(492.8) (J43.2)  
Cervical spondylosis   
(721.0) (M47.812)  
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   
(J44.9)  
Dysphagia (787.20)   
(R13.10)  
Encounter for   
immunization (V03.89)   
(Z23)  
Essential tremor (333.1)   
(G25.0)  
Fall (E888.9) (W19.XXXA)  
Familial hyperlipidemia   
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  
GERD (gastroesophageal   
reflux disease) (530.81)   
(K21.9)  
Glaucoma (365.9) (H40.9)  
History of malignant   
neoplasm of lung (V10.11)   
(Z85.118)  
History of pulmonary   
embolism (V12.55)   
(Z86.711)  
HTN (hypertension)   
(401.9) (I10)  
Hypoxemia (799.02)   
(R09.02)  
Ineffective esophageal   
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  
Lumbar facet arthropathy   
(721.3) (M47.816)  
Medicare annual wellness   
visit, subsequent (V70.0)   
(Z00.00)  
Medication management   
(V58.69) (Z79.899)  
Neck strain (847.0)   
(S16.1XXA)  
Osteoporosis (733.00)   
(M81.0)  
Overweight with body mass   
index (BMI) of 25 to 25.9   
in adult (278.02,V85.21)  
(E66.3,Z68.25)  
Prediabetes (790.29)   
(R73.03)  
Preop examination   
(V72.84) (Z01.818)  
Screening PSA (prostate   
specific antigen)   
(V76.44) (Z12.5)  
Seborrheic keratosis   
(702.19) (L82.1)  
Spondylolysis of cervical   
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  
Past Medical History  
History of abdominal pain   
(V13.89) (Z87.898)  
Resolved Date: 15 Teo   
2020  
History of hiatal hernia   
(V12.79) (Z87.19)  
Resolved Date: 16 Dec   
2021  
History of malignant   
neoplasm of lung (V10.11)   
(Z85.118)  
History of pulmonary   
embolism (V12.55)   
(Z86.711)  
History of Trigger thumb   
(727.03) (M65.319)  
Resolved Date: 2022  
Surgical History  
History of Atrial   
cardioversion  
History of Cardiac   
catheterization with   
stent placement  
History of Colonoscopy  
History of Coronary   
artery bypass graft  
x4  
History of Excision of   
basal cell carcinoma  
History of Kyphoplasty  
History of Lung lobectomy  
History of Shave biopsy  
right and left shoulder,   
earlobe, skin  
Family History  
Family history of acute   
myocardial infarction   
(V17.3) (Z82.49)  
Family history of cardiac   
disorder (V17.49)   
(Z82.49)  
Social History  
Former smoker (V15.82)   
(Z87.891)  
No illicit drug use  
No recent foreign travel  
Patient has living will   
(V49.89) (Z78.9)  
Social alcohol use   
(V49.89) (Z78.9)  
Allergies  
No Known Drug Allergies  
Recorded By: Richelle Hendrix; 2019 9:29:58   
AM  
Current Meds  
  
Medication   
NameInstructionReason  
Xarelto 20 MG Oral   
TabletTake 1 tablet   
dailyAtrial fibrillation  
OxygenOxygen concentrator   
via [facility]   
[facility]   
emphysema, COPD, severe  
Albuterol Sulfate    
(90 Base) MCG/ACT   
Inhalation Aerosol   
SolutionINHALE 2 PUFFS   
Every 4 hours PRNCOPD,   
severe  
Trelegy Ellipta   
100-62.5-25 MCG/INH   
Inhalation Aerosol Powder   
Breath ActivatedINHALE 1   
PUFFS DailyCOPD, severe  
Pantoprazole Sodium 40 MG   
Oral Tablet Delayed   
Releasetake 1 tablet by   
mouth once dailyDysphagia  
Latanoprost 0.005 %   
Ophthalmic   
SolutionINSTILL 1 DROP IN   
BOTH EYES AT   
BEDTIME.Glaucoma  
Calcium Carbonate 500 MG   
CHEWCHEW 1 TABLET Twice   
dailyHealth Maintenance  
Co Q-10 300 MG Oral   
CapsuleTAKE 1 CAPSULE   
DailyHealth Maintenance  
Magnesium Oxide 500 MG O   
(more content not   
included)...        Normal                                     
[facility]  
   
                                                    Tobacco Screening.on   
022   
   
                                        Fall risk assessment b) One or more fall  
s in   
the last year                                               [facility]  
Work Phone:   
[phone]  
   
                                                    Tobacco use status   
[facility]            b) No                                           [facility]  
Work Phone:   
[phone]  
33  
   
                                                    URINE CULTURE,BACTERIALon    
   
                                                    URINE   
CULTURE,BACTERIAL                       PATIENT: MIHAELA HEATON   
MRN: [id number] LOCATION:   
[address]#: [id number] :   
45 AGE: SEX: M  
  
ORDER#: [id number]   
ORDERED BY: KALEY VICENTE  
SOURCE: URINE COLLECTED:   
22 11:18  
ANTIBIOTICS AT [place].:   
RECEIVED : 22 00:20  
SITE: clean catch  
  
R E S U L T S  
  
URINE CULTURE,BACTERIAL   
FINAL 22 16:40  
  
NO SIGNIFICANT GROWTH. Normal                                  [facility]  
   
                                        Comment on above:   Performed By: #### U  
[facility] ####  
[facility]  
[address]  
[place] [address]   
   
                                                    Established Visit (Pain Medi  
cine)on 2022   
   
                                                    Established Visit   
(Pain Medicine)                         Diagnoses/Problems  
Cervical spondylosis   
(721.0) (M47.812)  
Neck strain (847.0)   
(S16.1XXA)  
Orders  
Cervical spondylosis,   
Neck strain  
Start: predniSONE 10 MG   
Oral Tablet; TAKE 3   
TABLETS DAILY FOR 2 DAYS,   
2 TABLETS  
DAILY FOR 2 DAYS AND 1   
TABLET DAILY FOR 2 DAYS,   
THEN STOP  
Patient   
Discussion/Summary  
I discussed with the   
patient the likely   
etiology of his symptoms,   
as well as potential   
treatment options  
We reviewed his x-ray   
which was notable for   
multilevel spondylosis   
and degenerative disc   
disease. There was no   
visualized fracture. I   
addressed options with   
him and I am going to put   
him on a brief low-dose   
steroid taper. I suspect   
that will resolve the   
symptoms. If it does not   
then since he has   
significant osteoporosis   
we will check a CT scan.   
I will see him for   
follow-up in 4 weeks or   
sooner if needed.  
  
Chief Complaint  
FUV last OV 2019 for   
LBP Today he is here with   
neck pain x 1-2 week   
across the whole back of   
his neck started as an   
ache that got more   
intense turned to   
throbbing he also has   
popping grinding   
sensation with his head   
turning side to side and   
up and down and gave him   
a HA x3 days, his pain   
increased with laying   
down so he has been   
sleeping in a recliner   
denies any issues with   
arms and hands. He tried   
Tylenol ES 2 tabs helps a   
little, Flexeril made him   
too dizzy so he stopped   
taking it, Icy hot patch   
did nothing, HE takes CBG   
drops for back pain. He   
did fall 22 jarring   
his neck causing an   
increase in pain denies   
LOC or hitting his head.   
He has pain off and on   
over past several years   
just thought he slept on   
it wrong, pain always got   
better.  
This is a 77-year-old   
male here for a chief   
complaint of right-sided   
neck pain. He reports   
this has been going on   
for approximately 2 weeks   
with no obvious cause. He   
states the pain starts on   
the right side of his   
neck behind the ear and   
when its severe and will   
radiate across the left   
side. It will radiate   
into the shoulders. He   
will get a grinding and   
popping sensation when   
turning his head. He gets   
pain particularly with   
turning to the right   
side. He denies numbness,   
tingling, weakness, or   
loss of bladder or bowel   
control. He has tried   
Tylenol and Flexeril   
which have not helped   
that much. He has had   
several previous   
compression fractures in   
his lumbar spine due to   
osteoporosis. He was last   
seen over 3 years ago for   
back pain which has been   
resolved. He has tried   
seeing a chiropractor but   
they would not do   
anything because of the   
osteoporosis. He reports   
the neck pain interrupts   
his function during the   
day and his sleep at   
night. He thinks it might   
be starting to get a   
little better. He did   
have a fall a week ago   
but reports that the   
symptoms predated the   
fall.  
The patient's past   
medical, social, and   
family history along with   
medications and allergies   
are available and were   
reviewed.  
  
Adult Risk Screening  
  
Living Will.  
Living Will: Living will   
on file.  
Healthcare POA: Health   
care proxy on file.  
Domestic Violence Screen:   
Does not feel threatened   
or abused physically,   
emotionally or sexually.   
Do you feel UNSAFE?  
The patient feels safe in   
the home.  
Depression/Suicide   
Screening:  
He does not have a risk   
of suicide.  
He has not had thoughts   
of harming others.  
  
Reference Documentation  
See scanned note Opioid   
Risk Tool .  
  
History of Present   
Illness  
On a scale of 0 to 10,   
the patient rates the   
pain at 3.  
now and 9/10 at its   
worst.  
Pain Location: Neck Pain   
and across both sides.  
Pain Quality: Aching,   
Throbbing and popps and   
cracks with turning and   
looking up and down.  
Pain Radiation: caused a   
HA x3 days.  
Sensory/ Motor: denies.  
Timing/Duration: Constant   
and < 6 weeks duration.  
Exacerbating Factors:   
turning neck side to side   
and up and down, laying   
down .  
Alleviating Factors:   
Medications,   
Repositioning, Other:   
___.  
24 Hour Behavior:  
Symptoms are the same in   
the am.  
Symptoms are the same as   
the day progresses.  
Symptoms are the same in   
the pm.  
Symptoms are the same   
when lying down.  
Effect of Movement on   
Symptoms:  
Lying makes symptoms   
worse.  
Turning makes symptoms   
worse. neck.  
Twisting makes symptoms   
worse. neck.  
Weather doesn't change   
symptoms. limits this   
back pain. limits this   
back pain. limits this   
back pain.  
Other Activity: looking   
up.  
Psychosocial Factors vs   
Last Visit:  
Physical Functioning:   
Worse.  
Family Relationships:   
Same.  
Social Relationships:   
Same.  
Mood: Same.  
Sleep Patterns: Worse.  
Overall Functioning:   
Worse.  
Self Management Tools:   
patient is resting with   
positive response,   
patient is using   
mindfulness with positive   
response and patient is   
using relaxation with   
positive response.  
Goals for Pain   
Management:  
Opioid Risk score 3.  
  
Review of Systems  
All 13 systems were   
reviewed and are within   
normal levels except as   
noted below or per HPI.   
Positive and pertinent   
negative responses are   
noted below or in the   
HPI.  
  
Active Problems  
Atrial fibrillation   
(427.31) (I48.91)  
Basal cell carcinoma   
(173.91) (C44.91)  
Body mass index (BMI) of   
24.0 to (more content not   
included)...        Normal                                     
[facility]  
   
                                                    Office Visit (Family Medicin  
e)on 2022   
   
                                        Follow-up visit     Diagnoses/Problems  
Neck strain (847.0)   
(S16.1XXA)  
Fall (E888.9) (W19.XXXA)  
Orders  
Neck strain  
Start: Cyclobenzaprine   
HCl - 10 MG Oral Tablet;   
TAKE 1 TABLET 3 times   
daily  
Xray Cervical Spine 2 or   
3 View; Status:Resulted -   
Preliminary; Done:   
[id number]  
09:43AM  
Radiologist to Determine   
Optimal Study : Y  
What are the patient's   
signs and symptoms? :   
fall on saturday, neck   
pain started on  
, worse with ROM  
Provider Impressions  
Neck strain: Start on   
Flexeril 100 mg three   
time daily as needed fro   
neck pain. May use heat   
and massage, recommend   
slow stretch no sudden   
neck movement. Will   
obtain cervical spine   
Xray.  
Return if symptoms   
persist or worsen  
  
Chief Complaint  
Neck pain x 2 days - pt   
did state that he fell on   
Saturday and the pain   
started  but   
doesn't think this is   
correlated.  
  
History of Present   
Illness  
fall in a family members   
home Saturday evening may   
have turned to quick,   
fell to his right side,   
Hit the ground tried to   
catch myself but did hit   
the floor. Neck pain   
started hurting. denies   
any other injury from   
fall. Denies hitting head  
Pain worse with any   
movement.  
Had  leftover  pain   
medication from ortho   
surgery, took and it did   
not help.  
Unable to take NSAID due   
to anticoagulant therapy.  
Denies any numbness or   
tingling  
  
Review of Systems  
Constitutional: no   
chills, no fever and no   
night sweats.  
Cardiovascular: no chest   
pain, no intermittent leg   
claudication, no lower   
extremity edema, no   
palpitations and no   
syncope.  
Respiratory: no cough, no   
shortness of breath   
during exertion, no   
shortness of breath at   
rest and no wheezing.  
Musculoskeletal: neck   
pain, but as noted in   
HPI.  
Integumentary: no new   
skin lesions and no   
rashes.  
Neurological: no   
difficulty walking, no   
headache, no limb   
weakness, no numbness and   
no tingling.  
Psychiatric: no anxiety,   
no depression, no   
anhedonia and no   
substance use disorders.  
  
Active Problems  
Atrial fibrillation   
(427.31) (I48.91)  
Basal cell carcinoma   
(173.91) (C44.91)  
Body mass index (BMI) of   
24.0 to 24.9 in adult   
(V85.1) (Z68.24)  
CAD (coronary artery   
disease) (414.00)   
(I25.10)  
Centrilobular emphysema   
(492.8) (J43.2)  
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   
(J44.9)  
Dysphagia (787.20)   
(R13.10)  
Encounter for   
immunization (V03.89)   
(Z23)  
Essential tremor (333.1)   
(G25.0)  
Familial hyperlipidemia   
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  
GERD (gastroesophageal   
reflux disease) (530.81)   
(K21.9)  
Glaucoma (365.9) (H40.9)  
History of malignant   
neoplasm of lung (V10.11)   
(Z85.118)  
History of pulmonary   
embolism (V12.55)   
(Z86.711)  
HTN (hypertension)   
(401.9) (I10)  
Hypoxemia (799.02)   
(R09.02)  
Ineffective esophageal   
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  
Lumbar facet arthropathy   
(721.3) (M47.816)  
Medicare annual wellness   
visit, subsequent (V70.0)   
(Z00.00)  
Medication management   
(V58.69) (Z79.899)  
Osteoporosis (733.00)   
(M81.0)  
Overweight with body mass   
index (BMI) of 25 to 25.9   
in adult (278.02,V85.21)  
(E66.3,Z68.25)  
Prediabetes (790.29)   
(R73.03)  
Preop examination   
(V72.84) (Z01.818)  
Screening PSA (prostate   
specific antigen)   
(V76.44) (Z12.5)  
Seborrheic keratosis   
(702.19) (L82.1)  
Past Medical History  
History of abdominal pain   
(V13.89) (Z87.898)  
Resolved Date: 15 Teo   
2020  
History of hiatal hernia   
(V12.79) (Z87.19)  
Resolved Date: 16 Dec   
2021  
History of malignant   
neoplasm of lung (V10.11)   
(Z85.118)  
History of pulmonary   
embolism (V12.55)   
(Z86.711)  
History of Trigger thumb   
(727.03) (M65.319)  
Resolved Date: 2022  
Surgical History  
History of Atrial   
cardioversion  
History of Cardiac   
catheterization with   
stent placement  
History of Colonoscopy  
History of Coronary   
artery bypass graft  
x4  
History of Excision of   
basal cell carcinoma  
History of Kyphoplasty  
History of Lung lobectomy  
History of Shave biopsy  
right and left shoulder,   
earlobe, skin  
Family History  
Family history of acute   
myocardial infarction   
(V17.3) (Z82.49)  
Family history of cardiac   
disorder (V17.49)   
(Z82.49)  
Social History  
Former smoker (V15.82)   
(Z87.891)  
No recent foreign travel  
Patient has living will   
(V49.89) (Z78.9)  
Allergies  
No Known Drug Allergies  
Recorded By: Richelle Hendrix; 2019 9:29:58   
AM  
Current Meds  
  
Medication   
NameInstructionReason  
Xarelto 20 MG Oral   
TabletTake 1 tablet   
dailyAtrial fibrillation  
OxygenOxygen concentrator   
via [facility]   
[facility] [place]   
emphysema, COPD, severe  
Albuterol Sulfate    
(90 Base) MCG/ACT   
Inhalation Aerosol   
SolutionINHALE 2 PUFFS   
Every 4 hours PRNCOPD,   
severe  
Trelegy Ellipta   
100-62.5-25 MCG/INH   
Inhalation Aerosol Powder   
Breath ActivatedINHALE 1   
PUFFS DailyCOPD, severe  
Pantoprazole Sodium 40 MG   
Oral Tablet Delayed   
Releasetake 1 tablet by   
mouth once dailyDysphagia  
Latanoprost 0.005 %   
Ophthalmic   
SolutionINSTILL 1 DROP IN   
BOTH EYES AT   
BEDTIME.Glaucoma  
Calcium Carbonate 500 MG   
CHEWCHEW 1 TABLET Twice   
dailyHealth Maintenance  
Co Q-10 300 MG Oral   
CapsuleTAKE 1 CAPSULE   
DailyHealth Maintenance  
Magnesium Oxide 500 MG   
Oral TabletTAKE 1 TABLET   
DAILY.Health Maintenance  
(more content not   
included)...        Normal                                     
[facility]  
   
                                                    Radiologyon 2022   
   
                                                    XR Cervical spine 3   
Views                                   Please click on the link   
to view the study images Normal                                  [facility]  
Work Phone:   
[phone]  
33  
   
                                                    XR Cervical spine 3   
Views                           Normal                          [facility]  
Work Phone:   
[phone]  
33  
   
                                                    SPINE, CERVICAL, 2 OR 3 VIEW  
Son 2022   
   
                                                    SPINE, CERVICAL, 2   
OR 3 VIEWS                              MRN: [id number]  
Patient Name: MIHAELA HEATON  
  
STUDY:  
SPINE, CERVICAL, 2 OR 3   
VIEWS; ; 2022 9:43   
am  
  
INDICATION:  
fall on saturday, neck   
pain started on ,   
worse with ROM  
S16.1XXA: Neck strain.  
  
COMPARISON:  
2018 chest   
radiograph and rib   
series. 2018   
chest  
CT, and chest radiograph  
  
ACCESSION NUMBER(S):  
[id number]  
  
ORDERING CLINICIAN:  
KY ROBERTS  
  
FINDINGS:  
Osseous demineralization.   
Alignment within normal   
limits. Moderate  
multilevel cervical   
spondylosis and   
degenerative disc changes   
most  
pronounced C4-5 through   
C6-7. No evident fracture   
or, or suspicious  
osseous lesion. Neck   
region calcifications   
compatible with carotid  
artery calcifications.   
Redemonstrated break,   
discontinuity within  
superior sternotomy wire   
which appears unchanged.  
  
IMPRESSION:  
Moderate multilevel   
spondylosis and   
degenerative disc   
changes.  
  
Scattered carotid artery   
calcifications.  
  
Unchanged broken superior   
sternotomy wire.  
  
Electronically signed by:   
GARY BLAKE MD                  Located within [facility]  
   
                                                    Tobacco Screening.on    
   
                                        Fall risk assessment b) One or more fall  
s in   
the last year                                               [facility]  
Work Phone:   
[phone]  
33  
   
                                                    Tobacco use status   
[facility]            b) No                                           [facility]  
Work Phone:   
[phone]  
33  
   
                                                    Medicare Annual Wellness Vis  
iton 2022   
   
                                                    Medicare Annual   
Wellness Visit                          *Chief Complaint  
medck and AWV  
  
**History of Present   
Illness  
The patient is being seen   
for the subsequent annual   
wellness visit.  
Past Medical, Surgical   
and Family History:   
reviewed and updated in   
chart.  
Medications and   
Supplements: Review of   
all medications by a   
prescribing practitioner   
or clinical pharmacist   
(such as prescriptions,   
OTCs, herbal therapies   
and supplements)   
documented in the medical   
record.  
No, the patient is not   
using opioids.  
Patient Self Assessment   
of Health Status: good.  
Tobacco use: User The   
patient is a former   
cigarette smoker and quit   
smoking . He has 80   
pack year(s) of cigarette   
use.  
Alcohol use: User The   
patient reports   
occasional alcohol use   
and drinking 2 drinks per   
week. The patient has no   
concerns about alcohol   
abuse.  
Illicit drug use:   
Non-User  
Current diet: well   
balanced diet, does   
consume adequate fluids   
and does not consume   
caffeine.  
Exercise Frequency: the   
patient does not   
exercise.  
Depression/Suicide   
Screening: .  
During the past 2 weeks,   
the patient has not felt   
down, depressed or   
hopeless.  
During the past 2 weeks,   
the patient has not felt   
little interest or   
pleasure in doing things.  
Hearing Impairment:   
Patient has significant   
hearing impairment,   
bilaterally.  
Cognitive Impairment: No   
cognitive impairment   
observed, patient or   
family reported cognitive   
impairment .  
Has been short term of   
recent information. Names   
and word finding OK.  
Bathing: performs   
independently.  
Dressing: performs   
independently.  
Walking: performs   
independently.  
Managing Finances:   
performs independently.  
Shopping: performs   
independently.  
Managing Medications:   
performs independently.  
Housework / Basic Home   
Maintenance: needs   
assistance.  
Because of back for heavy   
work due to fractures  
Falls Risk Screening:.   
MIHAELA has not fallen in   
the last 6 months.  
Home safety risk factors:   
none.  
Advance directives:.   
Advanced Care Planning   
discussed and documented   
advance care plan or   
surrogate decision maker   
documented in the medical   
record. Patient has   
living will. Patient has   
healthcare POA.  
Patient's End of Life   
Decisions: End of life   
decisions were reviewed   
with the patient. I agree   
to follow the patient's   
decisions.  
Additional Information:   
surrogates - wife and   
daughter.  
[facility] - Dr Carreno - no   
recent visit or worrisome   
lesions  
Atrial fibrillation - no   
chest pain or racing or   
swelling in ankles but   
did have when he went off   
of Lisinopril / Hctz. Dr Raphael did a normal   
stress test and ECHO   
before surgery in May.  
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   
seen on last scope. Does   
not have esophagitis to   
explain the pain in the   
chest with swallowing.   
Still on Pantoprazole.   
Felt to be spasm but   
amlodipine did not help   
after awhile. Worse with   
water or food. Today is a   
good day.  
Carotid Bruit per Dr Raphael. To have   
doppler  
COPD, severe and   
emphysema - Trelegy seems   
to work. Had daytime   
oxygen levels good but on   
2018 had   
nocturnal down to 87 for   
8 minutes. So on hs   
oxygen at hs. He checks   
at home and stays in 90s.   
. Dr Salmeron's office a   
few months ago. Dyspnea   
worse with mask.  
Coronary artery disease -   
No Chest pain,   
palpitations, numbness,   
weakness, edema,   
claudications, or double   
vision/ loss of vision.   
He is active with stairs.  
Familial hyperlipidemia -   
Med works well without   
side effects. Good last   
time except LDL a bit   
high  
Glaucoma - on drops and   
monitoring with Dr Castro   
at [facility] in April  
History of compression   
fracture of spine seems   
to be pretty decent.   
Occasional Tylenol but   
CBD oil seems to do   
better  
History of lung cancer   
() - no blood. Has a   
cough from the [facility] and   
mucus. Dr Luna has   
stopped his lisinopril to   
see if that makes a   
difference but it did   
not. Had allergy testing.   
Allergy to cockroaches.   
Stopped smoking more than   
15 years ago. Last CT was   
in 18. Dr Salmeron. EDDIE  
History of pulmonary   
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   
for life. No new dyspnea   
or pain.  
Hypertension - Med works   
well without side   
effects. Looks good off   
of amlodipine .  
Prediabetes - No   
polyphagia, polydipsia,   
polyuria, or non healing   
sores. A1C 6.2 in   
December  
Lumbar facet arthropathy   
Tylenol helps this but   
not needed recently as   
CBD works well  
Osteoporosis - still on   
Prolia and doing well and   
bone density 21. No   
change.  
Seborrheic keratosis no   
irritated skin lesions.  
Tremor - finds he is   
shaking a lot when   
eating. Bothering eating   
and writing. No meds.   
Discussed medication   
options and tried   
Primidone helped only a   
little and made him   
dizzy.  
Trigger finger surgery in   
May worked well  
Scope 18  
PSA 12/10/21  
AAA screen 20  
  
*Active Problems  
Atrial fibrillation   
(427.31) (I48.91)  
Basal cell carcinoma   
(173.91) (C44.91)  
CAD (coronary artery   
disease) (414.00)   
(I25.10)  
Centrilobular emphysema   
(492.8) (J43.2)  
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   
(J44.9)  
Dysphagia (787.20)   
(R13.10)  
Encounter for   
immunization (V03.89)   
(Z23)  
Essential tremor (333.1)   
(G25.0)  
Familial hyperlipidemia   
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  
GERD (gastroesophageal   
reflux disease (more   
content not included)... Normal                                  [facility]   
[facility]  
   
                                                    Post Op (Orthopaedic Surgery  
)on 2022   
   
                                                    Post Op (Orthopaedic   
Surgery)                                Diagnoses/Problems  
Assessed  
Trigger thumb (727.03)   
(M65.319)  
Patient   
Discussion/Summary  
By signing my name below,   
I, Bo Iniguez, attest that this   
documentation has been   
prepared under the   
direction and in the   
presence of Dr. Amaury Gaffney.  
All medical record   
entries made by the   
Bo were at my   
direction and personally   
dictated by me. I have   
reviewed the chart and   
agree that the record   
accurately reflects my   
personal performance of   
the history, physical   
exam, discussion and   
plan.  
  
Provider Impressions  
Assessment- left thumb   
pain, left thumb trigger   
finger, status post   
trigger release of left   
thumb  
Plan-patient is   
recovering very nicely   
from his trigger thumb   
release I did encourage   
him to begin some scar   
massage to see if this   
will help desensitize the   
area and diminish scar   
tissue formation he can   
slowly advance his   
activities to his   
tolerance level he is   
having some trouble with   
his right Achilles tendon   
he changed some oral   
medications and now has   
some increase in his   
lower extremity edema   
which could potentially   
be causing some of his   
symptoms because of the   
tightness from his edema   
he is going to call me if   
this does not resolve we   
will see him back on an   
as-needed basis  
This note has been   
created with voice   
recognition software.   
Please be aware that   
there may be grammatical   
or contextual errors due   
to the use of the   
software.  
  
Chief Complaint  
Post-op) as he is 9 days   
status post trigger   
release of the left thumb   
on 2022. He denies   
any significant pain at   
this time and denies   
clicking or catching of   
the thumb.  
  
History of Present   
Illness  
Patient is a pleasant   
76-year-old male   
presenting today for post   
operative evaluation as   
he is 9 days status post   
trigger release of the   
left thumb performed on   
2022. Patient   
states he has been doing   
well post operatively as   
he is without significant   
pain at this time. He   
denies any numbness,   
clicking, and catching or   
the thumb. He does have   
tenderness of the thumb   
and around the surgical   
incision. The incision is   
otherwise healing nicely   
with no signs of   
infection. Additionally,   
patient reports he had a   
recent change in his   
medication Pantoprazole   
and has also been having   
difficulty with swelling.   
He states the swelling of   
his foot and leg has been   
worsening since this   
change of medication. He   
does mention an incident   
where he was moving his   
foot while driving using   
the gas pedal, he felt a   
significant sharp pain   
that he described as   
feeling similar to felt   
 electricity.  He is   
going to discuss this   
with his cardiologist as   
he is scheduled for an   
upcoming office visit.  
  
Review of Systems  
Constitutional: no fever,   
no chills, not feeling   
tired, no recent weight   
gain and no recent weight   
loss.  
ENT: no nosebleeds.  
Cardiovascular: no chest   
pain.  
Respiratory: no shortness   
of breath and no cough.  
Gastrointestinal: no   
abdominal pain, no   
nausea, no diarrhea and   
no vomiting.  
Musculoskeletal: no   
arthralgias and as noted   
in HPI.  
Integumentary: no rashes   
and no skin wound.  
Neurological: no   
headache.  
Psychiatric: no   
depression and no sleep   
disturbances.  
Endocrine: no muscle   
cramps.  
Hematologic/Lymphatic: no   
swollen glands and no   
tendency for easy   
bruising.  
All other systems have   
been reviewed and are   
negative for complaint.  
  
Active Problems  
Problems  
Atrial fibrillation   
(427.31) (I48.91)  
Basal cell carcinoma   
(173.91) (C44.91)  
CAD (coronary artery   
disease) (414.00)   
(I25.10)  
Centrilobular emphysema   
(492.8) (J43.2)  
History of Compression   
fracture (829.0)  
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   
(J44.9)  
Dysphagia (787.20)   
(R13.10)  
Encounter for   
immunization (V03.89)   
(Z23)  
Essential tremor (333.1)   
(G25.0)  
Familial hyperlipidemia   
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  
GERD (gastroesophageal   
reflux disease) (530.81)   
(K21.9)  
Glaucoma (365.9) (H40.9)  
History of malignant   
neoplasm of lung (V10.11)   
(Z85.118)  
History of pulmonary   
embolism (V12.55)   
(Z86.711)  
HTN (hypertension)   
(401.9) (I10)  
Hyperlipidemia (272.4)   
(E78.5)  
Hypoxemia (799.02)   
(R09.02)  
Ineffective esophageal   
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  
Lumbar facet arthropathy   
(721.3) (M47.816)  
Medicare annual wellness   
visit, subsequent (V70.0)   
(Z00.00)  
Medication management   
(V58.69) (Z79.899)  
Osteoporosis (733.00)   
(M81.0)  
Overweight with body mass   
index (BMI) of 25 to 25.9   
in adult (278.02,V85.21)  
(E66.3,Z68.25)  
Prediabetes (790.29)   
(R73.03)  
Preop examination   
(V72.84) (Z01.818)  
Screening PSA (prostate   
specific antigen)   
(V76.44) (Z12.5)  
Seborrheic keratosis   
(702.19) (L82.1)  
Trigger thumb (727.03)   
(M65.319)  
Past Medical History  
Problems  
History of Compression   
fracture (829.0)  
History of abdominal pain   
(V13.89) (Z87.898)  
Resolved Date: 15 Teo   
2020  
History of hiatal hernia   
(V12.79) (Z87.19)  
Resolved Date: 16 Dec   
2021  
History of malignant   
neoplasm of lung (V10.11)   
(Z85.118)  
History of pulmonary   
embolism (V12.55)   
(Z86.711)  
Surgical History  
Problems  
History of Atrial   
cardioversion  
History of Cardiac   
catheterization with   
stent placement (more   
content not included)... Normal                                     
[facility]  
   
                                                    Tobacco Screening.on   
022   
   
                                                    Tobacco use status   
[facility]            b) No                                           [facility]   
Orthopedics   
and Sports   
Medicine [address]  
Work Phone:   
[phone]  
   
                                                    Order Reconciliationon    
   
                                        Order Reconciliation Page 1  
  
Discharge Reconciliation   
Document  
  
  
  
Reconciliation Type:   
Discharge requested on   
behalf of Amaury Gaffney  
(Physician) done by   
Amaury Gaffney (DO)  
  
Discharge -   
Reconciliation:   
04-May-2022 09:42 by:   
Amaury Gaffney (DO)  
  
Home Medications   
EnteredHOME MEDICATIONS   
AT DISCHARGE   
DateReconciliation  
Comment/ Additional   
Information  
Calcium 500+D oral   
tablet, chewable 1 tab(s)   
orally 2 times a day  
2020 14:56 Calcium   
500+D oral tablet,   
chewable 1 tab(s) orally   
2 times  
a day 2020 14:56   
Calcium 500+D oral   
tablet, chewable is   
continued as  
Calcium 500+D oral   
tablet, chewable  
CoQ10 300 mg oral capsule   
1 cap(s) orally once a   
day 2020 14:57  
CoQ10 300 mg oral capsule   
1 cap(s) orally once a   
day 2020 14:57   
CoQ10  
300 mg oral capsule is   
continued as CoQ10 300 mg   
oral capsule  
hydrocodone-acetaminophen   
5 mg-325 mg oral tablet 1   
tab(s) orally every 4  
hours, As Needed   
04-May-2022 09:41   
hydrocodone-acetaminophen   
5 mg-325 mg  
oral tablet 1 tab(s)   
orally every 4 hours, As   
Needed 04-May-2022 09:41  
hydrocodone-acetaminophen   
5 mg-325 mg oral tablet   
is continued as  
hydrocodone-acetaminophen   
5 mg-325 mg oral tablet  
latanoprost 0.005%   
ophthalmic solution 1   
drop(s) to each affected   
eye once a  
day (in the evening)   
2020 14:53   
latanoprost 0.005%   
ophthalmic  
solution 1 drop(s) to   
each affected eye once a   
day (in the evening)  
2020 14:53   
latanoprost 0.005%   
ophthalmic solution is   
continued as  
latanoprost 0.005%   
ophthalmic solution  
magnesium oxide 500 mg   
oral tablet 1 tab(s)   
orally once a day   
2020  
14:57 magnesium oxide 500   
mg oral tablet 1 tab(s)   
orally once a day  
2020 14:57   
magnesium oxide 500 mg   
oral tablet is continued   
as magnesium  
oxide 500 mg oral tablet  
metoprolol tartrate 25 mg   
oral tablet 1 tab(s)   
orally once a day (at   
bedtime)  
2021 10:29   
metoprolol tartrate 25 mg   
oral tablet 1 tab(s)   
orally once  
a day (at bedtime)   
2021 10:29   
metoprolol tartrate 25 mg   
oral tablet  
is continued as   
metoprolol tartrate 25 mg   
oral tablet  
pantoprazole 40 mg oral   
delayed release tablet 1   
tab(s) orally once a day  
2020 14:57   
pantoprazole 40 mg oral   
delayed release tablet 1   
tab(s)  
orally once a day   
2020 14:57   
pantoprazole 40 mg oral   
delayed release  
tablet is continued as   
pantoprazole 40 mg oral   
delayed release tablet  
pravastatin 40 mg oral   
tablet 1 tab(s) orally   
once a day 2020   
14:58  
pravastatin 40 mg oral   
tablet 1 tab(s) orally   
once a day 2020   
14:58  
pravastatin 40 mg oral   
tablet is continued as   
pravastatin 40 mg oral   
tablet  
Trelegy Ellipta   
inhalation powder 1   
puff(s) inhaled once a   
day 2020  
14:53 Trelegy Ellipta   
inhalation powder 1   
puff(s) inhaled once a   
day  
2020 14:53 Trelegy   
Ellipta inhalation powder   
is continued as Trelegy  
Ellipta inhalation powder  
Xarelto 20 mg oral tablet   
1 tab(s) orally once a   
day (in the morning)  
2021 10:29 Xarelto   
20 mg oral tablet 1   
tab(s) orally once a day   
(in  
the morning) 2021   
10:29 Xarelto 20 mg oral   
tablet is continued as  
Xarelto 20 mg oral tablet  
  
  
Current OrdersDateHOME   
MEDICATIONS AT DISCHARGE   
DateReconciliation   
Comment/  
Additional Information  
HYDROmorphone Injectable   
(DILAUDID)DOSE = 0.25 mg   
IntraVenous Push Every 5  
Minutes, PRN Pain - Mod   
(4-6) (PACU)Clinician   
Notes: ***Diana-operative   
order  
ONLY***Max total of 4 mg   
regardless of dose.   
03-May-2022 13:00  
HYDROmorphone Injectable   
is not required  
Lactated Ringers Infusion   
IV Bag Volume = 1,000 mL   
Run at: 100 mL/hr  
IntraVenous Clinician   
Notes: ***Diana-operative   
order ONLY***  
03-May-2022 13:00   
Lactated Ringers Infusion   
is not required  
Lactated Ringers Infusion   
IV Bag Volume = 1,000 mL   
Run at: 100 mL/hr  
IntraVenous 03-May-2022   
12:59 Lactated Ringers   
Infusion  
is not required  
  
  
Home Medications Added   
During Discharge   
Reconciliation  
Activity as Tolerated   
04-May-2022, Routine,   
Assistance Level: None,  
Restrictions: None, Limit   
your activities and rest   
today.  
Additional Patient   
Instructions Apply Ice   
pack to surgical area.  
Additional Patient   
Instructions Do not   
consume alcoholic   
beverages for 24  
hours.  
Additional Patient   
Instructions Do not   
engage in sports, heavy   
work or  
lifting.  
Additional Patient   
Instructions Do not make   
important decisions or   
sign any  
important documents for   
the next 24 hours.  
Additional Patient   
Instructions Keep   
Surgical incision dry and   
clean.  
Call Physician For:   
excessive bleeding (slow   
general oozing that   
completely  
soaks dressing or fresh   
bright red bleeding) or   
bleeding that will not   
stop.  
Apply pressure to the   
area and elevate.  
Call Physician For: if   
the arm or leg operated   
on has a change in color,  
numbness or tingling   
coldness to the touch or   
excessive pain.  
Call Physician For:   
inability to urinate   
every 8-12 hours and your   
bladder  
becomes too full or   
painful.  
Call Physician For:   
persistant nausea and/or   
vomiting Over 24 hours  
Call Physician For: signs   
and sypmtoms of infect   
(more content not   
included)...        Normal                                  [facility]  
   
                                                    Patient Profile - Preop v3on  
 2022   
   
                                                    Patient Profile -   
Preop v3                                Patient Profile - Preop:  
Initial Info:  
Patient DemographicsName:   
MIHAELA HEATON  
YOB: 1945  
MRN: [id number]  
Address: [address] [place], [place] [address]  
Primary Phone   
[phone]  
Call Attemptedattempt 1  
Instructions   
Givenappropriate   
clothing, bring   
responsible adult as the   
  
(procedure may be   
cancelled if no ),   
center location,   
insurance  
information  
Prep Instructions   
Reviewedyes  
Instructed to Have No   
Fluids Aftermidnight  
How to be Addressedgary  
Spoken Language   
PreferredEnglish  
Source of   
Informationpatient  
Stated Reason for   
AdmissionLeft trigger   
finger  
Primary Contact Name and   
NumberTeressa   
[phone]  
Medications [facility]   
[facility]  
  
General Health:  
Weight in kg86.2   
kilogram(s)  
  
Weight in ffh862 pound(s)  
  
Weight Methodactual   
(measured)  
  
Scale Typestanding  
Height in feet5 feet  
Height in kfljru86.94   
inch(es)  
Height in cm180.1   
centimeter(s)  
Height Methodstated  
BMI (kg/m2)26.575 square   
meter  
  
Patient or Family Member   
Reaction to Anesthesiano   
previous family member  
reaction; no previous   
reaction  
  
Blood   
Avoidance/Restrictionsnon  
e  
Previous Transfusion   
Reactionnot applicable  
  
Health Mgmt:  
Symptoms/Conditions   
Managed at Homecancer;   
respiratory;   
cardiovascular  
Cancer   
Symptoms/Conditionslung  
Cardiovascular   
Symptoms/Conditionsdysrhy  
thmia  
Respiratory   
Symptoms/ConditionsCOPD  
Barriers to Managing   
Healthnone  
  
Relationship/Environ:  
Lives Withspouse  
Living Arrangementshouse  
Resource/Environmental   
Concernsnone  
[place]   
[place]  
Services Anticipated at   
Transitionnone  
  
Tobacco Use:  
Tobacco Useno  
  
Pre-op Checklist:  
Arrival [id number]  
Arrival Time08:47  
Procedure TypeL Trigger   
finger L thumb  
NPOyes  
Last Food   
Intake03-May-2022 19:00  
Last Clear Fluid   
Intake03-May-2022 19:00  
ID Band On Patientpatient   
ID (name)  
Consent Signedyes  
H&P Completeyes  
Anesthesia Assessment   
Completedyes  
EKG Performednot ordered  
Chest X-Ray Performednot   
ordered  
Preop Antibioticsstarted   
in preop  
Beta-blocker Last Dose   
Date/Time03-May-2022   
17:00  
COVID 19 Results in Last   
7 daysnegative  
Type and Screen   
Resultedn/a  
Chlorhexadine Bath   
Givennot applicable  
Nasal Antiseptic   
Appliednot applicable  
Soap and Water Bath the   
Night Before Surgeryyes  
Hair Washed with   
Shampooyes  
Bowel Prepno  
Surgical Site Infection   
Preventionyes  
Pain Scales and   
Managementyes  
  
  
Additional Information:  
Information Review:  
Allergies, Home Meds and   
Significant Events have   
been Reviewed and   
Verified  
with Patient/Familyyes  
  
Allergy, Intolerance,   
Adverse Event:  
Allergies:  
No Known Allergies:   
Active  
  
  
Electronic Signatures:  
Ayla Dodge (ARELIS)   
(Signed 04-May-2022   
09:05)  
Authored: Initial Info,   
General Health, Health   
Mgmt,   
Relationship/Environ,  
Pre-op Checklist,   
Additional Information  
Belinda Sheikh)   
(Signed 2022   
10:27)  
Authored: Initial Info,   
General Health, Tobacco   
Use, Additional   
Information  
  
  
Last Updated: 04-May-2022   
09:05 by Ayla Dodge)                Located within [facility]  
   
                                                    Laboratory - Chemistry and C  
hemistry - challengeon 2022   
   
                                                    Anion gap   
[Moles/Vol]     10 mmol/L                       10 - 20         [facility]  
Work Phone:   
[phone]  
33  
   
                      Calcium [Mass/Vol] 8.8 mg/dL             8.6 - 10.3 [facility]  
Work Phone:   
[phone]  
33  
   
                                Chloride [Moles/Vol] 110 mmol/L      above high   
threshold                 98 - 107                  [facility]  
Work Phone:   
[phone]  
33  
   
                      CO2 [Moles/Vol] 28 mmol/L             21 - 32    [facility]  
Work Phone:   
[phone]  
33  
   
                                                    Creatinine   
[Mass/Vol]      0.95 mg/dL                      See Below       [facility]  
Work Phone:   
[phone]  
33  
   
                                        Comment on above:   Reference Range: 0.5  
0 - 1.30   
   
                      Glucose [Mass/Vol] 99 mg/dL              74 - 99    [facility]  
Work Phone:   
[phone]  
33  
   
                                                    Potassium   
[Moles/Vol]     4.2 mmol/L                      3.5 - 5.3       [facility]  
Work Phone:   
[phone]  
33  
   
                      Sodium [Moles/Vol] 144 mmol/L            136 - 145  [facility]  
Work Phone:   
[phone]  
33  
   
                                                    Urea nitrogen   
[Mass/Vol]      21 mg/dL                        6 - 23          [facility]  
Work Phone:   
[phone]  
33  
   
                                                    No Panel Informationon    
   
                                 83 {mL/min/1.73m2}            >90        [facility]  
Work Phone:   
[phone]  
33  
   
                                        Comment on above:   CALCULATIONS OF NANCY  
MATED GFR ARE PERFORMED USING THE    
CKD-EPI STUDY REFIT EQUATION WITHOUT THE RACE VARIABLE FOR THE   
IDMS-TRACEABLE CREATININE   
METHODS.https://jasn.asnjournals.org/content/early//ASN  
.5314188738   
   
                                                    Office Visiton 2022   
   
                                        Follow-up visit     Diagnoses/Problems  
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   
(J44.9)  
Centrilobular emphysema   
(492.8) (J43.2)  
Atrial fibrillation   
(427.31) (I48.91)  
CAD (coronary artery   
disease) (414.00)   
(I25.10)  
Dysphagia (787.20)   
(R13.10)  
GERD (gastroesophageal   
reflux disease) (530.81)   
(K21.9)  
HTN (hypertension)   
(401.9) (I10)  
Hyperlipidemia (272.4)   
(E78.5)  
Hypoxemia (799.02)   
(R09.02)  
Prediabetes (790.29)   
(R73.03)  
Trigger thumb (727.03)   
(M65.319)  
Familial hyperlipidemia   
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  
Essential tremor (333.1)   
(G25.0)  
History of pulmonary   
embolism (V12.55)   
(Z86.711)  
History of malignant   
neoplasm of lung (V10.11)   
(Z85.118)  
Ineffective esophageal   
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  
Osteoporosis (733.00)   
(M81.0)  
Orders  
Dysphagia  
Changed: From   
Pantoprazole Sodium 40 MG   
Oral Tablet Delayed   
Release TAKE 1  
TABLET BY MOUTH TWICE   
DAILY 30 MINUTES BEFORE   
BREAKFAST AND DINNER  
To Pantoprazole Sodium 40   
MG Oral Tablet Delayed   
Release take 1 tablet by   
mouth  
once daily  
HTN (hypertension)  
Basic Metabolic Panel;   
Status:Active; Requested   
for:[id number];  
Patient   
Discussion/Summary  
Before he goes to surgery   
I would like to see a   
BMP. Dr Raphael is   
doing a stress test and   
ECHo to follow up on CAD   
and Afib but not   
necessarily needed to   
clear for surgery since   
he is active with no   
symptoms other than   
dyspnea with is COPD.   
That is stable and should   
not preclude surgery,   
though oxygen levels will   
need to be monitored.  
He is on Xarelto and can   
hold that for 3 days   
prior to surgery. (He   
will Confirm with Dr Raphael)  
Hold CoQ10 for a week.  
Can take all other meds   
up until the day of   
surgery  
On day of surgery he is   
to take his Amlodipine   
and Metoprolol with a sip   
of water.  
  
  
Chief Complaint  
preop evaluation  
  
History of Present   
IllnessPreoperative   
evaluation for trigger   
thumb release on May 4.   
Chronic issues as   
follows.  
[facility] - Dr Carreno - no   
recent visit or worrisome   
lesions  
Atrial fibrillation - no   
chest pain or racing or   
swelling in ankles. Dr Raphael is to do a   
stress test and ECHO   
before surgery.  
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   
seen on last scope. Does   
not have esophagitis to   
explain the pain. See   
above. Still on   
Pantoprazole. Felt to be   
spasm and amlodipine   
daily helps. Worse with   
water or food.  
COPD, severe and   
emphysema - Trelegy seems   
to work. Had daytime   
oxygen levels good but on   
2018 had   
nocturnal down to 87 for   
8 minutes. So on hs   
oxygen at hs. He checks   
at home and stays in 90s.   
. Dr Salmeron's office a   
few days ago. Dyspnea   
worse with mask.  
Coronary artery disease -   
No Chest pain,   
palpitations, numbness,   
weakness, edema,   
claudications, or double   
vision/ loss of vision.   
He is active with stairs.  
Essential tremor becoming   
more bothersome with   
eating.  
Familial hyperlipidemia -   
Med works well without   
side effects. Good last   
time except LDL a bit   
high  
Glaucoma - on drops and   
monitoring with Dr Castro   
at [facility]. To see   
tomorrow.  
History of compression   
fracture of spine seems   
to be pretty decent.   
Occasional Tylenol but   
CBD oil seems to do   
better  
History of lung cancer   
() - no blood. Has a   
cough from the PND and   
mucus. Dr Luna has   
stopped his lisinopril to   
see if that makes a   
difference. EDDIE. Stopped   
smoking more than 15   
years ago. Last CT was in   
18. Dr Salmeron  
History of pulmonary   
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   
for life. No new dyspnea   
or pain.  
Hypertension - Med works   
well without side   
effects. Looks good off   
of the lisinopril.  
Hypoxemia - on at hs and   
daytime usually just   
stops and rests .  
Prediabetes - No   
polyphagia, polydipsia,   
polyuria, or non healing   
sores. A1C 6.2 last time.  
Lumbar facet arthropathy   
Tylenol helps this but   
not needed recently as   
CBD works well  
Osteoporosis - still on   
Prolia and doing well and   
bone density 21. No   
change.  
Seborrheic keratosis no   
irritated skin lesions.  
Tremor - finds he is   
shaking a lot when   
eating. Bothering eating   
and writing. No meds.   
Discussed medication   
options and tried   
Primidone helped only a   
little and made him   
dizzy.  
Scope 18  
PSA 12/10/21  
AAA screen 20  
  
Active Problems  
Atrial fibrillation   
(427.31) (I48.91)  
Basal cell carcinoma   
(173.91) (C44.91)  
CAD (coronary artery   
disease) (414.00)   
(I25.10)  
Centrilobular emphysema   
(492.8) (J43.2)  
History of Compression   
fracture (829.0)  
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   
(J44.9)  
Dysphagia (787.20)   
(R13.10)  
Encounter for   
immunization (V03.89)   
(Z23)  
Essential tremor (333.1)   
(G25.0)  
Familial hyperlipidemia   
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  
GERD (gastroesophageal   
reflux disease) (530.81)   
(K21.9)  
Glaucoma (365.9) (H40.9)  
History of malignant   
neoplasm of lung (V10.11)   
(Z85.118)  
History of pulmonary   
embolism (V12.55)   
(Z86.711)  
HTN (hypertension)   
(401.9) (I10)  
Hyperlipidemia (272.4)   
(E78.5)  
Hypoxemia (799.02)   
(R09.02)  
Ineffective esophageal   
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  
Lumbar facet arthropathy   
(721.3) (M47.816)  
Medicare annual wellness   
visit, subsequent (V70.0)   
(Z00.00)  
Medication management   
(V58.69) (Z79.899)  
Osteoporosis (733.00)   
(M81.0)  
Overweight with body mas   
(more content not   
included)...        Normal                                     
[facility]  
   
                                                    Tobacco Screening.on   
022   
   
                                                    Adult depression   
screening assessment No                                              [facility]   
[facility]  
Work Phone:   
[phone]  
   
                                        Fall risk assessment a) No falls within   
the   
last year                                                   [facility]  
[employer] Phone:   
[phone]  
   
                                                    Tobacco use status   
[facility]            b) No                                           [facility]   
[facility]  
Work Phone:   
[phone]  
   
                                                    Established Visit (Gastroent  
erology)on 2022   
   
                                                    Established Visit   
(Gastroenterology)                      Diagnoses/Problems  
Assessed  
Dysphagia (787.20)   
(R13.10)  
Orders  
Ineffective esophageal   
motility  
Renew: Levsin/SL 0.125 MG   
Sublingual Tablet   
Sublingual; PLACE 1   
TABLET UNDER  
THE TONGUE 3 TIMES DAILY   
AS NEEDED  
Rx By: Madan Morelos;   
Dispense: 30 Days ; #:60   
Tablet; Refill: 1;For:   
Ineffective esophageal   
motility; EBER = N; Sent   
To: [facility]   
#[facility]  
Provider Impressions  
Recommend he follow-up   
with ENT. I advised him   
there is very little we   
can do other than what is   
currently being done. We   
will follow-up in 6   
months. Advised him to   
finish full work-up with   
ENT  
  
Chief Complaint  
FUV in office today for   
pain in chest and   
dysphagia. Patient states   
that he has a lot of   
sinus drainage and is   
seeing an ENT in [place]   
for this who is going to   
be completing allergy   
testing.  
  
History of Present   
IllnessJerry is seen   
today in routine   
follow-up. Underwent full   
GI work-up for dysphagia   
last fall. At that time   
endoscopic biopsy showed   
no Frye's. He had   
normal LES based on [facility]   
criteria with a tight   
LES. pH manometry testing   
were then ordered he had   
no evidence of   
significant reflux by pH   
testing and his manometry   
showed ineffective   
esophageal motility with   
spasm.  
He was given a   
therapeutic trial of PPI   
therapy which did not   
seem to help but did   
improve some of his   
heartburn symptoms. Was   
placed on long-acting   
calcium channel blocker   
by family doctor which is   
improved symptoms but not   
totally abated them. He   
still has symptoms of   
ineffective swallow or   
food feeling lodged if he   
eats something to dry or   
if it is too hot or too   
cold. Overall his   
symptoms have improved.   
He was given a short-term   
prescription of Levsin   
for immediate relief   
which he is not taking.  
He is struggling with   
sinus issues feeling as   
though any air blowing   
across his shoulders neck   
or head cause him to   
produce a lot of mucus he   
is currently seeing an   
ear nose and throat   
doctor who took him off   
his lisinopril believing   
that might be causative   
he has follow-up with him   
next week for allergy   
testing and further   
management.  
  
Review of Systems  
Constitutional: no fever,   
no chills, not feeling   
tired and no recent   
weight loss.  
ENT: no lymphadenopathy.  
Cardiovascular: no   
shortness of breath and   
no chest pain.  
Respiratory: no cough.  
Gastrointestinal: as   
noted in HPI.  
Musculoskeletal: no joint   
swelling.  
Integumentary: no rashes,   
no skin lesions and was   
no jaundiced.  
All other systems have   
been reviewed and are   
negative for complaint.  
  
Active Problems  
Problems  
Atrial fibrillation   
(427.31) (I48.91)  
Basal cell carcinoma   
(173.91) (C44.91)  
CAD (coronary artery   
disease) (414.00)   
(I25.10)  
Centrilobular emphysema   
(492.8) (J43.2)  
History of Compression   
fracture (829.0)  
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   
(J44.9)  
Dysphagia (787.20)   
(R13.10)  
Encounter for   
immunization (V03.89)   
(Z23)  
Essential tremor (333.1)   
(G25.0)  
Familial hyperlipidemia   
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  
GERD (gastroesophageal   
reflux disease) (530.81)   
(K21.9)  
Glaucoma (365.9) (H40.9)  
History of malignant   
neoplasm of lung (V10.11)   
(Z85.118)  
History of pulmonary   
embolism (V12.55)   
(Z86.711)  
HTN (hypertension)   
(401.9) (I10)  
Hyperlipidemia (272.4)   
(E78.5)  
Hypoxemia (799.02)   
(R09.02)  
Ineffective esophageal   
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  
Lumbar facet arthropathy   
(721.3) (M47.816)  
Medicare annual wellness   
visit, subsequent (V70.0)   
(Z00.00)  
Medication management   
(V58.69) (Z79.899)  
Osteoporosis (733.00)   
(M81.0)  
Overweight with body mass   
index (BMI) of 25 to 25.9   
in adult (278.02,V85.21)  
(E66.3,Z68.25)  
Prediabetes (790.29)   
(R73.03)  
Screening PSA (prostate   
specific antigen)   
(V76.44) (Z12.5)  
Seborrheic keratosis   
(702.19) (L82.1)  
Trigger thumb (727.03)   
(M65.319)  
Past Medical History  
Problems  
History of Compression   
fracture (829.0)  
History of abdominal pain   
(V13.89) (Z87.898)  
Resolved Date: 15 Teo   
2020  
History of hiatal hernia   
(V12.79) (Z87.19)  
Resolved Date: 16 Dec   
2021  
History of malignant   
neoplasm of lung (V10.11)   
(Z85.118)  
History of pulmonary   
embolism (V12.55)   
(Z86.711)  
Surgical History  
Problems  
History of Atrial   
cardioversion  
History of Cardiac   
catheterization with   
stent placement  
History of Colonoscopy  
History of Coronary   
artery bypass graft  
x4  
History of Excision of   
basal cell carcinoma  
History of Kyphoplasty  
History of Lung lobectomy  
History of Shave biopsy  
right and left shoulder,   
earlobe, skin  
Family History  
Father  
Family history of acute   
myocardial infarction   
(V17.3) (Z82.49)  
Family history of cardiac   
disorder (V17.49)   
(Z82.49)  
Social History  
Problems  
Former smoker (V15.82)   
(Z87.891)  
No recent foreign travel  
Patient has living will   
(V49.89) (Z78.9)  
Allergies  
Medication  
No Known Drug Allergies  
Recorded By: Richelle Hendrix; 2019 9:29:58   
AM  
Current Meds  
  
Medication   
NameInstruction  
Albuterol Sulfate    
(90 Base) MCG/ACT   
Inhalation Aerosol   
SolutionINHALE 2 PUFFS   
Every 4 hours PRN  
amLODIPine Besylate 5 MG   
Oral TabletTake 1 tablet   
daily  
Calcium Carbonate 500 MG   
(more content not   
included)...        Normal                                     
[facility]  
   
                                                    Initial Visit (Orthopaedic S  
urgery)on 2022   
   
                                                    Initial Visit   
(Orthopaedic   
Surgery)                                Diagnoses/Problems  
Assessed  
Trigger thumb (727.03)   
(M65.319)  
Patient   
Discussion/Summary  
By signing my name below,   
I, Bo Iniguez, attest that this   
documentation has been   
prepared under the   
direction and in the   
presence of Dr. Amaury Gaffney.  
All medical record   
entries made by the   
Zoranibe were at my   
direction and personally   
dictated by me. I have   
reviewed the chart and   
agree that the record   
accurately reflects my   
personal performance of   
the history, physical   
exam, discussion and   
plan.  
  
Provider Impressions  
Assessment- left thumb   
pain, left thumb trigger   
finger  
Plan-patient does have a   
left trigger thumb. We   
did discuss conservative   
and surgical options for   
treating this including   
oral medication injection   
and surgical trigger   
thumb release patient was   
not interested in an   
injection he was fearful   
of the discomfort it was   
because he would like to   
proceed with surgical   
intervention he needs to   
check his schedule to   
figure out what day would   
be appropriate for this   
and we will plan for   
surgery in the near   
future  
This note has been   
created with voice   
recognition software.   
Please be aware that   
there may be grammatical   
or contextual errors due   
to the use of the   
software.  
  
Chief Complaint  
PT HERE FOR LEFT THUMB   
PAIN AND LOCKING. STATES   
LOCKING FOR THE PAST 5   
MONTHS. POPPING AND   
CRACKING. LOCKING HAS   
BECOME CONSTANT WITH   
INCREASED PAIN AFTERWARD.   
DENIES TREATMENT FOR THE   
THUMB IN THE PAST.   
REFERRED BY DR. JONES.  
  
History of Present   
Illness  
Patient is a pleasant   
76-year-old male   
presenting today for left   
thumb pain as referred by   
Dr. Jones. He states his   
thumb has been presenting   
with pain, locking, and   
triggering for the past 5   
months. He reports   
popping and cracking. He   
denies any known recent   
traumatic injury to the   
thumb and/or hand, but   
does mention   
approximately 55 years   
ago he was playing   
basketball and injured   
his left thumb. He denies   
experiencing   
complications over the   
past years. His current   
symptoms are quite   
bothersome as his locking   
has become constant and   
pain that increases   
significantly with   
activity. He denies any   
recent treatment or   
at-home remedies for the   
thumb. Patient states he   
is opposed to trying a   
corticosteroid injection   
today and plan for   
surgical intervention.  
  
Review of Systems  
Constitutional: no fever,   
no chills, not feeling   
tired, no recent weight   
gain and no recent weight   
loss.  
ENT: no nosebleeds.  
Cardiovascular: no chest   
pain.  
Respiratory: no shortness   
of breath and no cough.  
Gastrointestinal: no   
abdominal pain, no   
nausea, no diarrhea and   
no vomiting.  
Musculoskeletal: no   
arthralgias and as noted   
in HPI.  
Integumentary: no rashes   
and no skin wound.  
Neurological: no   
headache.  
Psychiatric: no   
depression and no sleep   
disturbances.  
Endocrine: no muscle   
cramps.  
Hematologic/Lymphatic: no   
swollen glands and no   
tendency for easy   
bruising.  
All other systems have   
been reviewed and are   
negative for complaint.  
  
Active Problems  
Problems  
Atrial fibrillation   
(427.31) (I48.91)  
Frye's esophagus   
(530.85) (K22.70)  
Basal cell carcinoma   
(173.91) (C44.91)  
CAD (coronary artery   
disease) (414.00)   
(I25.10)  
Centrilobular emphysema   
(492.8) (J43.2)  
History of Compression   
fracture (829.0)  
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   
(J44.9)  
Dysphagia (787.20)   
(R13.10)  
Encounter for   
immunization (V03.89)   
(Z23)  
Essential tremor (333.1)   
(G25.0)  
Familial hyperlipidemia   
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  
GERD (gastroesophageal   
reflux disease) (530.81)   
(K21.9)  
Glaucoma (365.9) (H40.9)  
History of malignant   
neoplasm of lung (V10.11)   
(Z85.118)  
History of pulmonary   
embolism (V12.55)   
(Z86.711)  
HTN (hypertension)   
(401.9) (I10)  
Hyperlipidemia (272.4)   
(E78.5)  
Hypoxemia (799.02)   
(R09.02)  
Ineffective esophageal   
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  
Lumbar facet arthropathy   
(721.3) (M47.816)  
Medicare annual wellness   
visit, subsequent (V70.0)   
(Z00.00)  
Medication management   
(V58.69) (Z79.899)  
Osteoporosis (733.00)   
(M81.0)  
Overweight with body mass   
index (BMI) of 25 to 25.9   
in adult (278.02,V85.21)  
(E66.3,Z68.25)  
Prediabetes (790.29)   
(R73.03)  
Screening PSA (prostate   
specific antigen)   
(V76.44) (Z12.5)  
Seborrheic keratosis   
(702.19) (L82.1)  
Past Medical History  
Problems  
History of Compression   
fracture (829.0)  
History of abdominal pain   
(V13.89) (Z87.898)  
Resolved Date: 15 Teo   
2020  
History of hiatal hernia   
(V12.79) (Z87.19)  
Resolved Date: 16 Dec   
2021  
History of malignant   
neoplasm of lung (V10.11)   
(Z85.118)  
History of pulmonary   
embolism (V12.55)   
(Z86.711)  
Surgical History  
Problems  
History of Atrial   
cardioversion  
History of Cardiac   
catheterization with   
stent placement  
History of Colonoscopy  
History of Coronary   
artery bypass graft  
x4  
History of Excision of   
basal cell carcinoma  
History of Kyphoplasty  
History of Lung lobectomy  
History of Shave biopsy  
right and left shoulder,   
earlobe, skin  
Family History  
Father  
Family history of acute   
myocardial infarction   
(V17.3) (Z82.49)  
Family history of cardiac   
disorder (V17.49) (Z82.4   
(more content not   
included)...        Normal                                     
[facility]  
   
                                                    Tobacco Screening.on   
022   
   
                                                    Tobacco use status   
[facility]            b) No                                           [facility]   
Orthopedics   
and Sports   
Medicine [address]  
Work Phone:   
[phone]  
   
                                                    Medicare Annual Wellness Vis  
iton 2021   
   
                                                    Medicare Annual   
Wellness Visit                          **History of Present   
Illness  
The patient is being seen   
for the subsequent annual   
wellness visit.  
Past Medical, Surgical   
and Family History:   
reviewed and updated in   
chart.  
Medications and   
Supplements: Medications   
and supplements,   
including calcium and   
vitamins reviewed and   
updated in chart.  
No, the patient is not   
using opioids.  
Patient Self Assessment   
of Health Status: fair.  
Tobacco use: User The   
patient is a former   
cigarette smoker and quit   
smoking . He has 80   
pack year(s) of cigarette   
use.  
Alcohol use: User The   
patient reports   
occasional alcohol use   
and drinking 2 drinks per   
week. The patient has no   
concerns about alcohol   
abuse.  
Illicit drug use:   
Non-User  
Current diet: well   
balanced diet, does not   
consume adequate fluids   
and does not consume   
caffeine.  
Exercise Frequency: the   
patient does not   
exercise.  
Depression/Suicide   
Screening: .  
During the past 2 weeks,   
the patient has not felt   
down, depressed or   
hopeless.  
During the past 2 weeks,   
the patient felt little   
interest or pleasure in   
doing things.  
PHQ-9 Depression Scale:  
1. Little interest or   
pleasure in doing things   
- nearly every day  
2. Feeling down,   
depressed or hopeless -   
not at all  
3. Trouble falling asleep   
or sleeping too much -   
not at all  
4. Feeling tired or   
having little energy -   
not at all  
5. Poor appetite or   
overeating - not at all  
6. Feeling bad about self   
or failure or letting   
others down - not at all  
7. Trouble concentrating   
on things - not at all  
8. Moving / speaking   
slowly or fidgety /   
restless - not at all  
9. Thought would be   
better off dead or   
hurting self - not at all  
Severity of depression is   
in remission, total   
score: 3.  
Hearing Impairment:   
Patient has significant   
hearing impairment,   
bilaterally.  
Cognitive Impairment: No   
cognitive impairment   
observed, patient or   
family reported cognitive   
impairment .  
Has been short term of   
recent information. Names   
and word finding OK.  
Bathing: performs   
independently.  
Dressing: performs   
independently.  
Walking: performs   
independently.  
Managing Finances:   
performs independently.  
Shopping: performs   
independently.  
Managing Medications:   
performs independently.  
Housework / Basic Home   
Maintenance: needs   
assistance.  
Because of back for heavy   
work due to fractures  
Falls Risk Screening:.   
MIHAELA has not fallen in   
the last 6 months.  
Home safety risk factors:   
none.  
Advance directives:.   
Patient has living will.   
Patient has healthcare   
POA.  
Patient's End of Life   
Decisions: End of life   
decisions were reviewed   
with the patient. I agree   
to follow the patient's   
decisions.  
Additional Information:   
pain 3.  
Since COVID-19   
immunization Pfizer he   
has had pains in the   
hips, then low back, then   
side of face, then acid   
reflux worse. Has been   
having a workup for this   
showing a sluggish   
sphincter. Pain anytime   
but mostly after   
something cold. Will get   
pain to the back.   
Pantoprazole does not   
help. Levsin SL maybe   
helps a bit. Has also   
tried nitroglycerin.   
Carafate did not help. I   
will have him try   
Amlodipine 5 mg daily to   
see if that helps. Drop   
the lisinopril HCTZ in   
half.  
[facility] - Dr Carreno - no   
recent visit or worrisome   
lesions  
Atrial fibrillation - no   
chest pain or racing or   
swelling in ankles.  
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   
seen on last scope. Does   
not have esophagitis to   
explain the pain. See   
above. Still on   
Pantoprazole. Feels PND   
may gather in throat so   
pills seem to stick.   
Worse with water or food.  
COPD, severe and   
emphysema - Trelegy seems   
to work. Had daytime   
oxygen levels good but on   
2018 had   
nocturnal down to 87 for   
8 minutes. So on hs   
oxygen at hs. Was to see   
pulmonologist before   
COVID-19. He checks at   
home and stays in 90s. .   
Dr Salmeron's office about   
6 months ago. Dyspnea   
worse with mask.  
Coronary artery disease -   
No Chest pain,   
palpitations, numbness,   
weakness, edema,   
claudications, or double   
vision/ loss of vision.   
To see Dr Raphael about   
a year ago.  
Familial hyperlipidemia -   
Med works well without   
side effects. Good this   
time except LDL a bit   
high  
Glaucoma - on drops and   
monitoring with Dr Castro   
at [facility]. To see   
tomorrow.  
History of compression   
fracture of spine seems   
to be pretty decent.   
Occasional Tylenol but   
CBD oil seems to do   
better  
History of lung cancer   
() - no blood or   
cough. EDDIE. Stopped   
smoking more than 15   
years ago. Last CT was in   
18. Dr Salmeron  
History of pulmonary   
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   
for life. No new dyspnea   
or pain  
Hypertension - Med works   
well without side   
effects.  
Hypoxemia - on at hs and   
daytime usually just   
stops and rests .  
Prediabetes - No   
polyphagia, polydipsia,   
polyuria, or non healing   
sores. A1C 6.2 this time.  
Lumbar facet arthropathy   
Tylenol helps this but   
not needed recently as   
CBD works well  
Osteoporosis - still on   
Prolia and doing well and   
bone density 19. No   
change.  
Seborrheic keratosis no   
irritated skin lesions.  
Tremor - finds he is   
shaking a lot when   
eating. Bothering eating   
and writing. No meds.   
Discussed medication   
options and tried   
Primidone helped (more   
content not included)... Normal                                     
[facility]  
   
                                                    Tobacco Screening.on   
021   
   
                                        Fall risk assessment a) No falls within   
the   
last year                                                   [facility]  
Work Phone:   
[phone]  
   
                                                    Tobacco use status   
CP            b) No                                           [facility]  
Work Phone:   
[phone]  
   
                                                    Complete Blood Count + Kenneth fry 12-   
   
                                                    Basophils/100 WBC   
(Bld)           0.4 %                           0.0 - 2.0       [facility]  
Work Phone:   
[phone]  
   
                                                    Erythrocyte   
distribution width   
(RBC) [Ratio]   13.7 %                          See Below       [facility]  
Work Phone:   
[phone]  
33  
   
                                        Comment on above:   Reference Range: 11.  
5 - 14.5   
   
                                                    Hematocrit (Bld)   
[Volume fraction] 45.3 %                          See Below       [facility]  
Work Phone:   
[phone]  
   
                                        Comment on above:   Reference Range: 41.  
0 - 52.0   
   
                                                    Hemoglobin (Bld)   
[Mass/Vol]      15.1 g/dL                       See Below       [facility]  
Work Phone:   
[phone]  
  
   
                                        Comment on above:   Reference Range: 13.  
5 - 17.5   
   
                                                    Lymphocytes/100 WBC   
(Bld)           17.3 %                          See Below       [facility]  
Work Phone:   
[phone]  
   
                                        Comment on above:   Reference Range: 13.  
0 - 44.0   
   
                                                    MCHC (RBC)   
[Mass/Vol]      33.3 g/dL                       See Below       [facility]  
Work Phone:   
[phone]  
   
                                        Comment on above:   Reference Range: 32.  
0 - 36.0   
   
                                                    MCV (RBC) [Entitic   
vol]            88 fL                           80 - 100        [facility]  
Work Phone:   
[phone]  
  
   
                                                    Monocytes/100 WBC   
(Bld)           10.8 %                          2.0 - 10.0      [facility]  
Work Phone:   
[phone]  
   
                                                    Neutrophils/100 WBC   
(Bld)           65.7 %                          See Below       [facility]  
Work Phone:   
[phone]  
  
   
                                        Comment on above:   Reference Range: 40.  
0 - 80.0   
   
                                                    Platelets (Bld)   
[#/Vol]         221 10*3/uL                     150 - 450       [facility]  
Work Phone:   
[phone]  
  
   
                      RBC (Bld) [#/Vol] 5.13 {x10E12/L}            See Below  [facility]  
Work Phone:   
[phone]  
33  
   
                                        Comment on above:   Reference Range: 4.5  
0 - 5.90   
   
                      WBC (Bld) [#/Vol] 5.0 10*3/uL            4.4 - 11.3 [facility]  
Work Phone:   
[phone]  
33  
   
                                                    Complete Blood Count   
+ Differential  0.00 {x10E9/L}                  See Below       [facility]  
Work Phone:   
[phone]  
33  
   
                                        Comment on above:   Reference Range: 0.0  
0 - 0.10   
   
                                                    Complete Blood Count   
+ Differential  0.30 {x10E9/L}                  See Below       [facility]  
Work Phone:   
[phone]  
   
                                        Comment on above:   Reference Range: 0.0  
0 - 0.40   
   
                                                    Complete Blood Count   
+ Differential  0.50 {x10E9/L}                  See Below       [facility]  
Work Phone:   
[phone]  
33  
   
                                        Comment on above:   Reference Range: 0.0  
5 - 0.80   
   
                                                    Complete Blood Count   
+ Differential  0.90 {x10E9/L}                  See Below       [facility]  
Work Phone:   
[phone]  
33  
   
                                        Comment on above:   Reference Range: 0.8  
0 - 3.00   
   
                                                    Complete Blood Count   
+ Differential  3.30 {x10E9/L}                  See Below       [facility]  
Work Phone:   
[phone]  
33  
   
                                        Comment on above:   Reference Range: 1.6  
0 - 5.50 Percent differential counts (%)   
should be interpreted in the context of the absolute cell counts   
(cells/L).   
   
                                                    Complete Blood Count   
+ Differential  5.8 %                           0.0 - 6.0       [facility]  
Work Phone:   
[phone]  
   
                                                    Complete Blood Count   
+ Differential  0.1 {/100_WBC}                                  [facility]  
Work Phone:   
[phone]  
   
                                                    Hemoglobin A1Con 12-   
   
                      Glucose [Mass/Vol] 131 mg/dL                        [facility]  
Work Phone:   
[phone]  
   
                                                    HbA1c (Bld) [Mass   
fraction]       6.2 %           Abnormal                        [facility]  
Work Phone:   
[phone]  
   
                                        Comment on above:   Diagnosis of Diabete  
s-Adults Non-Diabetic: < or = 5.6%   
Increased   
risk for developing diabetes: 5.7-6.4% Diagnostic of diabetes: >   
or = 6.5%. Monitoring of Diabetes Age (y) Therapeutic Goal (%)   
Adults: >18 <7.0 Pediatrics: 13-18 <7.5 7-12 <8.0 0- 6 7.5-8.5   
American Diabetes Association. Diabetes Care 33(S1), 2010.   
   
                                                    Laboratory - Chemistry and C  
hemistry - challengeon 12-   
   
                                                    Albumin BCP dye   
[Mass/Vol]      3.8 g/dL                        3.4 - 5.0       [facility]  
Work Phone:   
[phone]  
33  
   
                                                    ALP [Catalytic   
activity/Vol]   55 U/L                          33 - 136        [facility]  
Work Phone:   
[phone]  
33  
   
                                                    ALT With P-5'-P   
[Catalytic   
activity/Vol]   16 U/L                          10 - 52         [facility]  
Work Phone:   
[phone]  
33  
   
                                        Comment on above:   Patients treated wit  
h Sulfasalazine may generate falsely   
decreased results for ALT.   
   
                                                    Anion gap   
[Moles/Vol]     11 mmol/L                       10 - 20         [facility]  
Work Phone:   
[phone]  
33  
   
                                                    AST With P-5'-P   
[Catalytic   
activity/Vol]   16 U/L                          9 - 39          [facility]  
Work Phone:   
[phone]  
33  
   
                      Bilirubin [Mass/Vol] 0.6 mg/dL             0.0 - 1.2  [facility]  
Work Phone:   
[phone]  
33  
   
                      Calcium [Mass/Vol] 9.4 mg/dL             8.6 - 10.3 [facility]  
Work Phone:   
[phone]  
33  
   
                      Chloride [Moles/Vol] 103 mmol/L            98 - 107   [facility]  
Work Phone:   
[phone]  
33  
   
                      CO2 [Moles/Vol] 31 mmol/L             21 - 32    [facility]  
Work Phone:   
[phone]  
33  
   
                                                    Creatinine   
[Mass/Vol]      0.91 mg/dL                      See Below       [facility]  
Work Phone:   
[phone]  
33  
   
                                        Comment on above:   Reference Range: 0.5  
0 - 1.30   
   
                                Glucose [Mass/Vol] 141 mg/dL       above high   
threshold                 74 - 99                   [facility]  
Work Phone:   
[phone]  
   
                                                    Potassium   
[Moles/Vol]     3.9 mmol/L                      3.5 - 5.3       [facility]  
Work Phone:   
[phone]  
33  
   
                      Protein [Mass/Vol] 6.6 g/dL              6.4 - 8.2  [facility]  
Work Phone:   
[phone]  
33  
   
                      Sodium [Moles/Vol] 141 mmol/L            136 - 145  [facility]  
Work Phone:   
[phone]  
33  
   
                                                    Urea nitrogen   
[Mass/Vol]                25 mg/dL                  above high   
threshold                 6 - 23                    [facility]  
Work Phone:   
[phone]  
   
                                                    Lipid Panelon 12-   
   
                                                    Cholesterol   
[Mass/Vol]      182 mg/dL                       0 - 199         [facility]  
Work Phone:   
[phone]  
   
                                        Comment on above:   . AGE DESIRABLE BORD  
CATRACHITA HIGH HIGH 0-19 Y 0 - 169 170 - 199   
>/=   
200 20-24 Y 0 - 189 190 - 224 >/= 225 >24 Y 0 - 199 200 - 239 >/=   
240 **All ranges are based on fasting samples. Specific   
therapeutic targets will vary based on patient-specific cardiac   
risk.. Pediatric guidelines reference:Pediatrics 2011, 128(S5).   
Adult guidelines reference: NCEP ATPIII Guidelines, AMINAH 2001,   
258:2486-97. Venipuncture immediately after or during the   
administration of Metamizole may lead to falsely low results.   
Testing should be performed immediately prior to Metamizole   
dosing.   
   
                                                    Cholesterol in HDL   
[Mass/Vol]      48.0 mg/dL                                      [facility]  
Work Phone:   
[phone]  
   
                                        Comment on above:   . AGE VERY LOW LOW N  
ORMAL HIGH 0-19 Y < 35 < 40 40-45 ---- 20-  
24   
Y ---- < 40 >45 ---- >24 Y ---- < 40 40-60 >60.   
   
                                                    Cholesterol in LDL   
[Mass/Vol]                108 mg/dL                 above high   
threshold                 0 - 99                    [facility]  
Work Phone:   
[phone]  
   
                                        Comment on above:   . NEAR BORD AGE LULU  
RABLE OPTIMAL HIGH HIGH VERY HIGH 0-19 Y 0  
 -   
109 --- 110-129 >/= 130 ---- 20-24 Y 0 - 119 --- 120-159 >/= 160   
---- >24 Y 0 - 99 100-129 130-159 160-189 >/=190.   
   
                                                    Cholesterol.total/Ch  
olesterol in HDL   
[Mass ratio]    3.8 {ratio}                                     [facility]  
Work Phone:   
[phone]  
   
                                        Comment on above:   REF VALUESDESIRABLE   
< 3.4HIGH RISK > 5.0   
   
                                                    Triglyceride   
[Mass/Vol]      132 mg/dL                       0 - 149         [facility]  
Work Phone:   
[phone]  
   
                                        Comment on above:   . AGE DESIRABLE BORD  
CATRACHITA HIGH HIGH VERY HIGH 0 D-90 D 19 -   
174   
---- ---- ----91 D- 9 Y 0 - 74 75 - 99 >/= 100 ---- 10-19 Y 0 -   
89 90 - 129 >/= 130 ---- 20-24 Y 0 - 114 115 - 149 >/= 150 ----   
>24 Y 0 - 149 150 - 199 200- 499 >/= 500. Venipuncture   
immediately after or during the administration of Metamizole may   
lead to falsely low results. Testing should be performed   
immediately prior to Metamizole dosing.   
   
                      Lipid Panel 26 mg/dL              0 - 40     [facility]  
Work Phone:   
[phone]  
   
                                                    No Panel Informationon 12-10  
-2021   
   
                                 >60                   >60        [facility]  
Work Phone:   
[phone]  
   
                                        Comment on above:   CALCULATIONS OF NANCY  
MATED GFR ARE PERFORMED USING THE MDRD   
STUDY   
EQUATION FOR THE IDMS-TRACEABLE CREATININE METHODS. CLIN CHEM   
2007;53:766-72   
   
                                                    Prostate Spec.Ag, Screenon 1  
2-   
   
                                                    Prostate specific Ag   
[Mass/Vol]      3.73 ng/mL                      See Below       [facility]  
Work Phone:   
[phone]  
   
                                        Comment on above:   Reference Range: 0.0  
0 - 4.00The FDA requires that the method   
used   
for PSA assay be reported to the physician. Values obtained with   
different assay methods must not be used interchangeably. This   
testwas performed at [facility] using the Access   
Hybr[employer]ch PSA assay is a two-site immunoenzymatic sandwich assay.   
The assay is approved for measurement of prostate-specific   
antigen (PSA)in serum and may be used in conjunction with a   
digital rectal examination in men 50 years and older as an aid in   
detection of prostate cancer.5-Alpha-reductase inhibitors (e.g.   
Proscar, Finasteride, Avodart, Dutasteride and May) for the   
treatment of BPH have been shown to lower PSA levels by an   
average of 50% after 6 months of treatment.   
   
                                                    Established Visit (Gastroent  
erology)on 11-   
   
                                                    Established Visit   
(Gastroenterology)                      Diagnoses/Problems  
Assessed  
Dysphagia (787.20)   
(R13.10)  
Ineffective esophageal   
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  
Orders  
Ineffective esophageal   
motility  
Start: Levsin/SL 0.125 MG   
Sublingual Tablet   
Sublingual; PLACE 1   
TABLET UNDER THE  
TONGUE 3 TIMES DAILY AS   
NEEDED  
Rx By: Madan Morelos;   
Dispense: 30 Days ; #:10   
Tablet; Refill: 3;For:   
Ineffective esophageal   
motility; EBER = N; Sent   
To: [facility]   
[facility]  
Chief Complaint  
FUV in office today for   
dysphagia, patient had   
manometry testing and PH   
testing. Patient is   
wondering if he needs to   
continue taking   
pantoprazole.  
  
History of Present   
IllnessJerry is seen   
today in follow-up. Upper   
endoscopy showed no   
stricture. No hiatal   
hernia. Esophageal   
manometry showed   
ineffective esophageal   
motility and low LES   
pressure. pH testing did   
not show concordance of   
symptoms with reflux. No   
improvement is noted with   
twice daily dosing of   
Protonix he is dropped   
back to once daily.  
Reviewed the findings of   
the study. At this point   
dietary modification   
change now eats his most   
effective strategy. He   
will on occasion have a   
feeling of tightness in   
esophagus after eating   
especially if he eats too   
much or too rapidly. I   
have explained to him   
that this could be   
esophageal spasms as he   
does have symptoms like   
this if he drinks   
something with ice cold   
liquids or ice cream.  
  
Review of Systems  
Constitutional: no fever,   
no chills, not feeling   
tired and no recent   
weight loss.  
ENT: no lymphadenopathy.  
Cardiovascular: no   
shortness of breath and   
no chest pain.  
Respiratory: no cough.  
Gastrointestinal: as   
noted in HPI.  
Musculoskeletal: no joint   
swelling.  
Integumentary: no rashes,   
no skin lesions and was   
no jaundiced.  
All other systems have   
been reviewed and are   
negative for complaint.  
  
Active Problems  
Problems  
Atrial fibrillation   
(427.31) (I48.91)  
Frye's esophagus   
(530.85) (K22.70)  
Basal cell carcinoma   
(173.91) (C44.91)  
CAD (coronary artery   
disease) (414.00)   
(I25.10)  
Centrilobular emphysema   
(492.8) (J43.2)  
History of Compression   
fracture (829.0)  
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   
(J44.9)  
Dysphagia (787.20)   
(R13.10)  
Encounter for   
immunization (V03.89)   
(Z23)  
Essential tremor (333.1)   
(G25.0)  
Familial hyperlipidemia   
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  
GERD (gastroesophageal   
reflux disease) (530.81)   
(K21.9)  
Glaucoma (365.9) (H40.9)  
Hiatal hernia (553.3)   
(K44.9)  
History of malignant   
neoplasm of lung (V10.11)   
(Z85.118)  
History of pulmonary   
embolism (V12.55)   
(Z86.711)  
HTN (hypertension)   
(401.9) (I10)  
Hyperlipidemia (272.4)   
(E78.5)  
Hypoxemia (799.02)   
(R09.02)  
Lumbar facet arthropathy   
(721.3) (M47.816)  
Medicare annual wellness   
visit, subsequent (V70.0)   
(Z00.00)  
Medication management   
(V58.69) (Z79.899)  
Osteoporosis (733.00)   
(M81.0)  
Prediabetes (790.29)   
(R73.03)  
Screening PSA (prostate   
specific antigen)   
(V76.44) (Z12.5)  
Seborrheic keratosis   
(702.19) (L82.1)  
Past Medical History  
Problems  
History of Compression   
fracture (829.0)  
History of abdominal pain   
(V13.89) (Z87.898)  
Resolved Date: 15 Teo   
2020  
History of malignant   
neoplasm of lung (V10.11)   
(Z85.118)  
History of pulmonary   
embolism (V12.55)   
(Z86.711)  
Surgical History  
Problems  
History of Atrial   
cardioversion  
History of Cardiac   
catheterization with   
stent placement  
History of Colonoscopy  
History of Coronary   
artery bypass graft  
x4  
History of Excision of   
basal cell carcinoma  
History of Kyphoplasty  
History of Lung lobectomy  
History of Shave biopsy  
right and left shoulder,   
earlobe, skin  
Family History  
Father  
Family history of acute   
myocardial infarction   
(V17.3) (Z82.49)  
Family history of cardiac   
disorder (V17.49)   
(Z82.49)  
Social History  
Problems  
Former smoker (V15.82)   
(Z87.891)  
No recent foreign travel  
Patient has living will   
(V49.89) (Z78.9)  
Allergies  
Medication  
No Known Drug Allergies  
Recorded By: Richelle Hendrix; 2019 9:29:58   
AM  
Current Meds  
  
Medication   
NameInstruction  
Albuterol Sulfate    
(90 Base) MCG/ACT   
Inhalation Aerosol   
SolutionINHALE 2 PUFFS   
Every 4 hours PRN  
Calcium Carbonate 500 MG   
Oral Tablet ChewableCHEW   
1 TABLET Twice daily  
Co Q-10 300 MG Oral   
CapsuleTAKE 1 CAPSULE   
Daily  
Latanoprost 0.005 %   
Ophthalmic   
SolutionINSTILL 1 DROP IN   
BOTH EYES AT BEDTIME.  
Lisinopril-hydroCHLOROthi  
azide 20-25 MG Oral   
TabletTake 1 tablet daily  
Magnesium Oxide 500 MG   
Oral TabletTAKE 1 TABLET   
DAILY.  
Metoprolol Succinate ER   
25 MG Oral Tablet   
Extended Release 24   
HourTake 1 tablet daily  
Nitroglycerin 0.4 MG   
Sublingual Tablet   
SublingualPLACE 1 TABLET   
UNDER THE TONGUE EVERY 5   
MINUTES FOR UP TO 3 DOSES   
AS NEEDED FOR CHEST   
PAIN.CALL 911 IF PAIN   
PERSISTS.  
OxygenOxygen concentrator   
via NC at 2LPM at hs  
Pantoprazole Sodium 40 MG   
Oral Tablet Delayed   
ReleaseTAKE 1 TABLET BY   
MOUTH TWICE DAILY 30   
MINUTES BEFORE BREAKFAST   
AND DINNER  
Pravastatin Sodium 40 MG   
Oral TabletTake 1 tablet   
daily  
Prolia 60 MG/ML   
Subcutaneous Solution   
Prefilled Syringe1 ml   
subcutaneous q6 months  
Sucralfate 1 GM Oral   
Tablet1 tablet 4 times a   
day after meals a (more   
content not included)... Normal                                     
[facility]  
   
                                                    No Panel Informationon    
   
                                                            [email or web address]  
[employer]  
[employer]MediaSilo/[employer].aspx?={5  
[id number]  
[id number]}                                                     [facility]  
Work Phone:   
[phone]  
  
   
                                                                  [facility]  
Work Phone:   
[phone]  
   
                                                            [email or web address]  
[employer]  
[employer].aspx?={9  
[id number]  
[id number]}                                                     [facility]  
Work Phone:   
[phone]  
33  
   
                                                                  [facility]  
Work Phone:   
[phone]  
   
                                                            [email or web address]  
letitia jean/[employer].aspx?={F  
[id number]  
[id number]}                                                     [facility]  
[facility]   
120  
Work Phone:   
[phone]  
17  
   
                                                                  [facility]   
120  
Work Phone:   
[phone]  
17  
   
                                                    Coronavirus 2019 RNA by PCR,  
 Screening Asymptomticon 2021   
   
                                                    Coronavirus 2019 RNA   
by PCR, Screening   
Asymptomtic     Not detected    Normal          See Below       [facility]  
Work Phone:   
[phone]  
   
                                        Comment on above:   SOURCE: Nasal, Nasop  
haryngealReference Range: Not   
Detected.This   
assay is designed to detect the N, ORF1ab and/or S genes of   
SARS-CoV-2 via nucleic acid amplification. A Negative (NOT   
DETECTED) result does not preclude 2019-nCoV infection since the   
adequacy of sample collection and/or low viral burden may result   
in presence of viral nucleic acids below the clinical sensitivity   
of this test method. Negative (NOT DETECTED) result should not be   
used as the sole basis for treatment or other patient management   
decisions. Rather negative results should be combined with   
clinical observations, patient history, and epidemiological   
information to make patient management decisions.Fact sheet for   
providers: https://www.fda.gov/media/874687/downloadFact sheet   
for patients: https://www.fda.gov/media/597946/downloadThis test   
has received FDA Emergency Use Authorization (EUA) and has been   
verified by [facility]   
([facility]). This test is only authorized for the duration of time   
that circumstances exist to justify the authorization of the   
emergency use of in vitro diagnostic tests for the detection of   
SARS-CoV-2 virus and/or diagnosis of COVID-19 infection under   
section 564(b)(1) of the Act, 21 U.S.C. 360bbb-3(b)(1), unless   
the authorization is terminated or revoked sooner. [facility] is certified under CLIA-88 as   
qualified to perform high complexity testing. Testing is   
performed in the [facility] laboratories located at [address] [place] [address].   
   
                                                    Narrative Note - Outpatiento  
n 2021   
   
                                                    Narrative Note -   
Outpatient                              Narrative Note:  
Description  
spoke with this patient   
10/29 to explain   
EMOT/BRAVO but was unable   
to enter the  
note at that time as the   
patients visit was not   
linked. His last dose of   
PPI  
was 10/28. BRAVO   
pre-procedure checklist   
done. Patient has been   
vaccinated for  
Covid-19 and has covid   
test scheduled  in   
[place]. DF  
  
  
  
Electronic Signatures:  
Angelina Herrera (RN)   
(Signed 2021   
08:42)  
Authored: Narrative Note   
- OP  
  
  
Last Updated: 2021   
08:42 by Angelina Herrera   
(ARELIS)                Normal                                  [facility]  
   
                                                    No Panel Informationon    
   
                                                            [email or web address]  
josepha  
tilupe/[employer].aspx?={B  
[id number]  
[id number]}                                                     [facility]  
Work Phone:   
[phone]  
   
                                                                  [facility]  
Work Phone:   
[phone]  
   
                                                    Xray Bone Density, Dexa 1 or  
 More Siteson 2021   
   
                                                    Xray Bone Density,   
Dexa 1 or More Sites                 Normal                          [facility]  
Work Phone:   
[phone]  
   
                                                    Tobacco Screening.on 06-15-2  
021   
   
                                        Fall risk assessment a) No falls within   
the   
last year                                                   [facility]  
Work Phone:   
[phone]  
   
                                                    Tobacco use status   
[facility]            b) No                                           [facility]  
Work Phone:   
[phone]  
   
                                                    BD Bone Density DEXAon    
   
                                        BD Bone Density DEXA Exam Date/Time:  
2019 10:15 EDT  
Reason for Exam:  
OSTEOPOROSIS;Osteoporosis  
Report  
STUDY:  
BD Bone Density DEXA;   
2019 10:15 am  
INDICATION:  
Osteoporosis. Evaluate   
for   
osteopenia/osteoporosis,  
ACCESSION NUMBER(S):  
[id number]  
ORDERING CLINICIAN:  
Jake Jones  
FINDINGS:  
Standard measurements   
were obtained utilizing   
an [employer] X-ray   
Absorptiometry bone  
densitometer. Data   
obtained includes planar   
bone density measurements   
over the left hip, left  
forearm and lumbar spine.   
Comparison of measured   
data and standardized   
mean data for a young  
adult population (when   
peak bone mass occurs)   
results in a T score.   
This represents the   
number  
of standard deviations   
above or below the mean   
of a young adult   
population. Comparison of  
measured data to   
standards from an   
age-adjusted population   
similarly yields a Z   
score.  
Left femoral neck  
Bone density: 0.515 g/cm2  
T score: -3.0  
Z Score: -1.8  
Left forearm  
Bone density: 0.668 g/cm2  
T score: -2.7  
Z Score: -1.3  
Lumbar Spine (L1-4)  
Bone density: 0.870 g/cm2  
T Score: -2.3  
Z Score: -1.3  
Exam Date/Time:  
2019 10:15 EDT  
Report  
World Health Organization   
(WHO) criteria defines   
normal bone density as   
that which is less than  
1 standard deviation   
below the mean of a young   
adult population.   
Osteopenia is defined as   
a  
measured bone density   
that is between 1 and 2.5   
standard deviations below   
the mean of a young  
adult population.   
Osteoporosis is defined   
as a measured bone   
density that is greater   
than or  
equal to 2.5 standard   
deviations below the mean   
of a young adult   
population.  
IMPRESSION:  
According to World Health   
Organization criteria,   
bone mineral density of   
the left femoral neck,  
left forearm and lumbar   
spine is osteoporotic.   
The patient is at high   
risk for fracture.  
There has been no   
interval decrease in left   
total hip, left forearm   
or lumbar spine bone   
mineral  
density measurements when   
compared to the prior   
study of 2017.  
***** FINAL REPORT *****  
Dictated: 2019   
12:23 pm Kamaljit Saba MD  
Signed (Electronic   
Signature): 2019   
12:23 pm  
Signed by: Kamaljit Saba MD  
Technologist: ROLAND   Normal                                  [facility]  
   
                                                    BMPon 2019   
   
                                                    Anion gap   
[Moles/Vol]     9 mmol/L        Low             10-20           [facility]  
   
                                        Comment on above:   Performed By: #### 2  
[id number] ####  
KRISTIAN [facility] [employer]  
[address] [place] [id number]   
   
                      Calcium [Mass/Vol] 9.2 mg/dL  Normal     8.6-10.3   [facility]  
   
                                        Comment on above:   Performed By: #### 2  
[id number] ####  
[facility] [employer]  
[facility] [place] [address]   
   
                      Chloride [Moles/Vol] 104 mmol/L Normal          [facility]  
   
                                        Comment on above:   Performed By: #### 2  
[id number] ####  
[facility] [employer]  
[address] [place] [address]   
   
                      CO2 [Moles/Vol] 31.0 mmol/L Normal     21.0-32.0  [facility]  
   
                                        Comment on above:   Performed By: #### 2  
[id number] ####  
[facility] [employer]  
[address] [place] [address]   
   
                                                    Creatinine   
[Mass/Vol]      1.0 mg/dL       Normal          0.5-1.3         [facility]  
   
                                        Comment on above:   Performed By: #### 2  
[id number] ####  
[facility] [employer]  
[address] [place] [address]   
   
                      Glucose [Mass/Vol] 117 mg/dL  High       70-99      [facility]  
   
                                        Comment on above:   Performed By: #### 2  
[id number] ####  
[facility] [employer]  
[address] [place] [address]   
   
                                                    Potassium   
[Moles/Vol]     3.8 mmol/L      Normal          3.5-5.3         [facility]  
   
                                        Comment on above:   Performed By: #### 2  
[id number] ####  
[facility] [employer]  
[address] [place] [address]   
   
                      Sodium [Moles/Vol] 141 mmol/L Normal     136-145    [facility]  
   
                                        Comment on above:   Performed By: #### 2  
[id number] ####  
[facility] [employer]  
[address] [place] [address]   
   
                                                    Urea nitrogen   
[Mass/Vol]      26 mg/dL        High            6-23            [facility]  
   
                                        Comment on above:   Performed By: #### 2  
[id number] ####  
[facility] [employer]  
[address] [place] [address]   
   
                                                    Urea   
nitrogen/Creatinine   
[Mass ratio]    26.0 ratio      Normal          5.4-30.0        [facility]  
   
                                        Comment on above:   Performed By: #### 2  
[id number] ####  
[facility] [employer]  
[address] [place] [address]   
   
                                                    EomK6rtj 2019   
   
                                                    HbA1c (Bld) [Mass   
fraction]       6.3 %           Normal          4.0-6.3         [facility]  
   
                                        Comment on above:   Performed By: #### 3  
[id number] ####  
[facility]ST[facility] Chemistry Manual Subsection  
[address] [place] [address]   
   
                                                    Lipid Profileon 2019   
   
                                                    Cholesterol   
[Mass/Vol]      154 mg/dL       Normal          0-199           [facility]  
   
                                        Comment on above:   Result Comment: TOTA  
L CHOLEESTEROL: <200 NORMAL  
200 - 239 BORDERLINE HIGH  
>240 HIGH   
   
                                                            Performed By: #### 3  
[id number] ####  
[facility] [employer]  
[address] [place] [address]   
   
                                                    Cholesterol in HDL   
[Mass/Vol]      52 mg/dL        Normal          40-60           [facility]  
   
                                        Comment on above:   Performed By: #### 3  
[id number] ####  
[facility] [employer]  
[address] [place] [address]   
   
                                                    Cholesterol in LDL   
[Mass/Vol]      78 mg/dL        Normal          0-130           [facility]  
   
                                        Comment on above:   Result Comment: <100  
 OPTIMAL  
100-129 NEAR / ABOVE OPTIMAL  
130-159 BORDERLINE HIGH  
160-189 HIGH  
>190 VERY HIGH  
CALC LDL NOT VALID WHEN TRIGLYCERIDE IS >400 MG/DL   
   
                                                            Performed By: #### 3  
[id number] ####  
[facility] [employer]  
[address] [place] [address]   
   
                                                    Cholesterol in VLDL   
[Mass/Vol]      24 mg/dL        Normal          0-40            [facility]  
   
                                        Comment on above:   Performed By: #### 3  
[id number] ####  
[facility] [employer]  
[address] [place] [address]   
   
                                                    Triglyceride   
[Mass/Vol]      122 mg/dL       Normal          0-149           [facility]  
   
                                        Comment on above:   Result Comment: AGE   
DESIRABLE BORDERLINE HIGH  
  
91 D - 9 Y 0 - 74 75 - 99 > 100  
10 - 19 Y 0 - 89 90 - 129 > 130  
20 -24 Y 0 - 114 115 - 149 > 150  
> 25 0 - 149 150 - 199 200 - 499   
   
                                                            Performed By: #### 3  
[id number] ####  
[facility] [employer]  
[address] [place] [address]   
   
                                                    eGFRon 2019   
   
                                                    GFR/1.73 sq M   
predicted among   
non-blacks MDRD   
(S/P/Bld) [Vol   
rate/Area]      mL/min/{1.73_m2} Normal                          [facility]  
   
                                        Comment on above:   Order Comment: Order  
 added by Discern Expert.   
   
                                                            Performed By: #### 1  
[id number] ####  
[facility] [employer]  
[address] [place] [address], [place] [address]   
   
                                                    CT Spine Lumbar w/o Contrast  
on 2019   
   
                                                    CT Spine Lumbar w/o   
Contrast                                Exam Date/Time:  
2019 17:05 EDT  
Reason for Exam:  
SPINAL STENOSIS LUMBAR   
REGION W/NEUROGENIC   
CLAUDICATION  
Report  
STUDY:  
CT Spine Lumbar w/o   
Contrast; 2019 5:05   
pm  
INDICATION:  
SPINAL STENOSIS LUMBAR   
REGION W/NEUROGENIC   
CLAUDICATION.  
COMPARISON:  
Plain film examination of   
06/15/2018  
ACCESSION NUMBER(S):  
[id number]  
ORDERING CLINICIAN:  
Jonathan Landers  
TECHNIQUE:  
Contiguous axial CT   
sections are performed   
through the lumbar spine   
and supplemented with   
coronal  
and sagittal reformatted   
images.  
FINDINGS:  
The osseous structures   
are diffusely   
demineralized. There   
compression deformities   
of the L1  
and L2 vertebral body   
with evidence kyphoplasty   
methylmethacrylate is   
identified within both  
vertebral bodies which is   
noted to the right of   
midline at L2 and L1. The   
fact related extends  
to the left of midline at   
L1. There is curvilinear   
extravasation of   
methylmethacrylate into   
the  
L2-3 disc space   
centrally. There is mild   
central compression   
deformity at the superior   
endplate  
of T12 with concavity of   
the endplate. This   
appearance is stable from   
the plain film appearance  
of 06/15/2018. The   
remaining lumbar vertebra   
are of normal height and   
AP alignment. There is  
otherwise no sign of   
linear fracture, bone   
destruction, or   
aggressive periosteal   
reaction. No  
lytic or blastic lesion   
is identified.  
There is mild disc space   
narrowing at L4-5.  
The T12-L1 disc space   
level demonstrates   
minimal bilateral facet   
arthrosis. There is no   
central  
canal or neural foraminal   
stenosis.  
The L1-2 disc space level   
demonstrates minimal   
bilateral facet   
arthrosis. There is mild   
bulging  
disc with flattening of   
the anterior thecal sac.   
There is no significant   
central canal or neural  
foraminal stenosis.  
The L2-3 disc space level   
demonstrates mild   
bilateral facet arthrosis   
and ligamentum flavum  
hypertrophy. There is   
mild bulging disc with   
some flattening of the   
anterior thecal sac and   
mild  
central canal narrowing.   
There is no significant   
neural foraminal   
stenosis.  
The L3-4 disc space level   
demonstrates mild   
bilateral facet arthrosis   
and ligamentum flavum  
hypertrophy. There is   
mild bulging disc with   
some flattening of the   
anterior thecal sac.   
There  
is no significant central   
canal or neural foraminal   
stenosis.  
Exam Date/Time:  
2019 17:05 EDT  
Report  
The L4-5 disc space level   
demonstrates mild facet   
arthrosis and ligamentum   
flavum hypertrophy.  
There is mild bulging   
disc with flattening of   
the anterior thecal sac   
and mild central canal  
narrowing. There is some   
disc encroachment on the   
caudal aspect of the   
neural foramen though no  
significant neural   
foraminal stenosis.  
The L5-S1 disc space   
level demonstrates   
minimal facet arthrosis.   
There is no central canal   
or  
neural foraminal   
stenosis.  
The surrounding soft   
tissues demonstrate   
partially visualized   
bilateral renal cysts and  
subsegmental linear   
atelectasis in the right   
lower lobe.  
IMPRESSION:  
Osteopenia.  
Mild discogenic   
degenerative changes at   
L4-5 with mild multilevel   
bulging disc as described  
above. There is no   
significant central canal   
or neural foraminal   
stenosis.  
Compression deformities   
of L1 and L2 with   
previous kyphoplasty.   
These findings are   
described  
above.  
Central compression   
deformity at the superior   
endplate of T12 is   
unchanged from a previous   
plain  
film examination of   
2018.  
Incidental findings of   
bilateral renal cysts and   
subsegmental atelectasis   
in the right lower  
lobe.  
The need for further   
workup with lumbar spine   
MRI should be determined   
clinically.  
***** FINAL REPORT *****  
Dictated: 2019 8:02   
am Jerald Almonte MD  
Signed (Electronic   
Signature): 2019   
8:02 am  
Signed by: Jerald Almonte MD  
Technologist: ISIDRA   [facility]  
   
                                                    XR Pelvis 1 or 2 Viewson    
   
                                                    XR Pelvis 1 or 2   
Views                                   Exam Date/Time:  
2019 11:00 EDT  
Reason for Exam:  
LSS, sacroilitis  
Report  
STUDY:  
XR Pelvis 1 or 2 Views;   
2019 11:00 am  
INDICATION:  
LSS, sacroiliitis.  
COMPARISON:  
None.  
ACCESSION NUMBER(S):  
[id number]  
ORDERING CLINICIAN:  
Jonathan Landers  
FINDINGS:  
2 AP views of the pelvis   
were obtained. There is   
no evidence of acute   
fracture or dislocation  
identified. Mild   
hypertrophic degenerative   
changes are seen in the   
sacroiliac joints  
bilaterally. Minimal   
joint space narrowing is   
seen in the hips   
bilaterally.  
IMPRESSION:  
1. No fracture or   
dislocation.  
2. Degenerative changes,   
as described above.  
***** FINAL REPORT *****  
Dictated: 2019 3:23   
pm Kamaljit Saba MD  
Signed (Electronic   
Signature): 2019   
3:23 pm  
Signed by: Kamaljit Saba MD  
Technologist: [facility]  
   
                                                    XR Spine Lumbar w/ Obliqueso  
n 2019   
   
                                                    XR Spine Lumbar w/   
Obliques                                Exam Date/Time:  
2019 11:00 EDT  
Reason for Exam:  
LSS, sacroilitis  
Report  
STUDY:  
XR Spine Lumbar w/   
Obliques; 2019 11:00   
am  
INDICATION:  
LSS, sacroilitis.  
COMPARISON:  
06/15/2018  
ACCESSION NUMBER(S):  
[id number]  
ORDERING CLINICIAN:  
Jonathan Landers  
FINDINGS:  
Five views of the lumbar   
spine including AP,   
lateral, lateral   
cone-down and bilateral   
oblique  
views were obtained.   
Vertebroplasty changes   
are present at the L1 and   
L2 levels. A mild   
superior  
endplate compression   
fracture is seen at the   
T12 level, similar to the   
prior study.There is no  
new fracture identified.   
The vertebral bodies are   
well aligned without   
evidence of subluxation.  
Mild disc space narrowing   
and small marginal   
osteophytes are present   
at the L4-5 level.   
Mild-to-  
moderate facet   
degenerative changes are   
seen throughout the   
lumbar spine. There is no   
evidence of  
pars interarticularis   
defect. Atherosclerotic   
calcifications are seen   
throughout the abdominal  
aorta and iliac arteries.  
IMPRESSION:  
1. No evidence of acute   
fracture.  
2. Postoperative and   
degenerative changes   
throughout the lumbar   
spine, as described   
above.  
***** FINAL REPORT *****  
Dictated: 2019 3:25   
pm Kamaljit Saba MD  
Signed (Electronic   
Signature): 2019   
3:25 pm  
Signed by: Kamaljit Saab MD  
Technologist: [facility]  
   
                                                    Office Visiton 2017   
   
                                                    Documentation of   
current medications   
(procedure)               Done                      Invalid   
Interpretation   
Code                                                [facility]   
[facility] [employer]  
Work Phone:   
[phone]  
   
                                Fall risk assessment No              Invalid   
Interpretation   
Code                                                [employer]  
Work Phone:   
[phone]  
   
                                Protein mass conc Done            Invalid   
Interpretation   
Code                                                [facility]   
[facility]  
Work Phone:   
[phone]  
   
                                                    Lab Report: Basic Metabolic   
Profile (BMP)on 2017   
   
                      Anion gap  3 mmol/L   Low        5-15       [employer] [facility]  
Work Phone:   
[phone]  
   
                                                    Anion gap 4 molar   
conc            3               Low             5-15            [facility]   
[facility]  
Work Phone:   
[phone]  
[phone]  
   
                      Anion gap molar conc 3 mmol/L   Low        5-15       [employer]  
Work Phone:   
[phone]  
   
                      BUN/Creatinine Ratio 24.2 RATIO High       10-20      [employer]  
Work Phone:   
[phone]  
   
                                Calcium         9.4 mg/dL       Invalid   
Interpretation   
Code                      8.5-10.1                  [employer]  
Work Phone:   
[phone]  
   
                                Chloride        106 mmol/L      Invalid   
Interpretation   
Code                                          [employer]  
Work Phone:   
[phone]  
   
                      CO2        33.0 mmol/L High       21.0-32.0  [employer]  
Work Phone:   
[phone]  
   
                      CO2 ppres (BldV) 33.0 mmol/L High       21.0-32.0  [employer]  
Work Phone:   
[phone]  
   
                                Creatinine      0.95 mg/dL      Invalid   
Interpretation   
Code                      0.70-1.30                 [employer]  
Work Phone:   
[phone]  
   
                                eGFR (non-black) 100 mL/min/{1.73_m2} Invalid   
Interpretation   
Code                      >60                       [employer]  
Work Phone:   
[phone]  
   
                                eGFR (non-black) 83 mL/min/{1.73_m2} Invalid   
Interpretation   
Code                      >60                       [employer]  
Work Phone:   
[phone]  
[phone]  
   
                                EST GFR - AA    100 mL/min      Invalid   
Interpretation   
Code                      >60                       [employer]  
Work Phone:   
[phone]  
   
                                Glucose         101 mg/dL       Invalid   
Interpretation   
Code                                          [employer]  
Work Phone:   
[phone]  
   
                                Glucose mass conc 101 mg/dL       Invalid   
Interpretation   
Code                                          [employer]  
Work Phone:   
[phone]  
   
                                Potassium       4.4 mmol/L      Invalid   
Interpretation   
Code                      3.5-5.1                   [employer]  
Work Phone:   
[phone]  
   
                                Sodium          142 mmol/L      Invalid   
Interpretation   
Code                      136-145                   [employer]  
Work Phone:   
[phone]  
   
                      Urea nitrogen 23 mg/dL   High       7-18       [employer]  
Work Phone:   
[phone]  
   
                                                    Lab Report: CBC-Complete Blo  
od Cnt No Diffon 2017   
   
                                                    Erythrocyte   
distribution width   
Auto Ratio (RBC)          42.4 fL                   Invalid   
Interpretation   
Code                      35.1-43.9                 [facility]  
Work Phone:   
[phone]  
   
                                                    Erythrocyte   
distribution width   
Ratio (RBC)     13.1 %                          11.6-14.6       [facility]   
[facility]  
Work Phone:   
[phone]  
   
                                                    Erythrocyte   
distribution width   
Ratio (RBC)     42.4 fL                         35.1-43.9       [facility]   
[facility]  
Work Phone:   
[phone]  
   
                                Erythrocytes (RBC) 5.13 10*6/uL    Invalid   
Interpretation   
Code                      4.6-6.2                   [facility]   
[facility]  
Work Phone:   
[phone]  
   
                                Hematocrit (HCT) 46.2 %          Invalid   
Interpretation   
Code                      40-54                     [facility]  
Work Phone:   
[phone]  
   
                                                    Hematocrit Volume   
Fraction (Bld)  46.2 %                          40-54           [facility]   
[facility]  
Work Phone:   
[phone]  
   
                                Hemoglobin (HGB) 15.3 g/dL       Invalid   
Interpretation   
Code                      13.0-16.5                 [facility]  
Work Phone:   
[phone]  
   
                                MCH             29.8 pg         Invalid   
Interpretation   
Code                      27.0-32.0                 [facility]   
[facility]  
Work Phone:   
[phone]  
  
   
                                                    MCH Entitic mass   
(RBC)           29.8 pg                         27.0-32.0       [facility]   
[facility]  
Work Phone:   
[phone]  
  
   
                                MCHC            33.1 G/GL       Invalid   
Interpretation   
Code                      32-36                     [facility]   
[facility]  
Work Phone:   
[phone]  
  
   
                      MCHC mass conc (RBC) 33.1 G/GL             32-36      [facility]   
[facility]  
Work Phone:   
[phone]  
  
   
                                MCV             90.1 fL         Invalid   
Interpretation   
Code                      80-94                     [facility]  
Work Phone:   
[phone]  
  
   
                                                    MCV Entitic volume   
(RBC)           90.1 fL                         80-94           [facility]   
[facility]  
Work Phone:   
[phone]  
  
   
                                                    Platelet mean volume   
Entitic volume (Bld) 10.0 fL                         6.2-12.0        [facility]   
[facility]  
Work Phone:   
[phone]  
  
   
                                Platelets       184 10*3/mm3    Invalid   
Interpretation   
Code                      150-450                   [facility]  
Work Phone:   
[phone]  
  
   
                                                    Platelets #/vol   
(Bld)           184 10*3/mm3                    150-450         [facility]  
Work Phone:   
[phone]  
[phone]  
   
                                PMV by Mateusz-Dominic 10.0 fL         Invalid   
Interpretation   
Code                      6.2-12.0                  [employer] Phone:   
[phone]  
   
                      RBC #/vol (Bld) 5.13 10*6/uL            4.6-6.2    [employer]  
Work Phone:   
[phone]  
   
                                RDW-CA          13.1 %          Invalid   
Interpretation   
Code                      11.6-14.6                 [employer]  
Work Phone:   
[phone]  
   
                                                    red blood cell   
distribution width,   
size density              42.4 fL                   Invalid   
Interpretation   
Code                      35.1-43.9                 [employer]  
Work Phone:   
[phone]  
   
                      WBC #/vol (Bld) 4.7 10*3/uL            4.4-11.0   [employer] Phone:   
[phone]  
   
                                WBC (Leukocytes) 4.7 10*3/uL     Invalid   
Interpretation   
Code                      4.4-11.0                  [employer] Phone:   
[phone]  
   
                                                    Replaced Document: Reny Rae 2017   
   
                                EKG QRS axis    -48 deg         Invalid   
Interpretation   
Code                                                [employer] Phone:   
[phone]  
   
                                                    electrocardiogram   
interpretation                          Sinus Rhythm - occasional   
ectopic ventricular beat   
-Left axis -anterior   
fascicular block. -Poor   
R-wave progression   
-nonspecific -consider   
old anterior infarct. -   
Nonspecific   
T-abnormality. ABNORMAL                 Invalid   
Interpretation   
Code                                                [employer] Phone:   
[phone]  
   
                                                    GE use only - for   
[employer] import   
when terms are not   
otherwise specified       451 ms                    Invalid   
Interpretation   
Code                                                [employer] Phone:   
[phone]  
   
                                        Interpretation      Sinus Rhythm - occas  
ional   
ectopic ventricular beat   
-Left axis -anterior   
fascicular block. -Poor   
R-wave progression   
-nonspecific -consider   
old anterior infarct. -   
Nonspecific   
T-abnormality. ABNORMAL                 Invalid   
Interpretation   
Code                                                [employer] Phone:   
[phone]  
   
                                P Axis          1 deg           Invalid   
Interpretation   
Code                                                [employer] Phone:   
[phone]  
   
                                                    P wave axis,   
electrocardiogram         1 deg                     Invalid   
Interpretation   
Code                                                [employer] Phone:   
[phone]  
   
                                VT Interval     184 ms          Invalid   
Interpretation   
Code                                                [employer] Phone:   
[phone]  
   
                                                    VT interval,   
electrocardiogram         184 ms                    Invalid   
Interpretation   
Code                                                [employer]  
Work Phone:   
[phone]  
   
                                Pulse (Heart Rate) 71 /min         Invalid   
Interpretation   
Code                                                [employer] Phone:   
[phone]  
   
                                                    QRS axis,   
electrocardiogram         -48 deg                   Invalid   
Interpretation   
Code                                                [employer] Phone:   
[phone]  
   
                                QRS Duration    108 ms          Invalid   
Interpretation   
Code                                                [employer]  
Work Phone:   
[phone]  
   
                                                    QRS duration,   
electrocardiogram         108 ms                    Invalid   
Interpretation   
Code                                                [employer] Phone:   
[phone]  
   
                                QT Interval     new path ms     Invalid   
Interpretation   
Code                                                [facility] [employer]  
Work Phone:   
[phone]  
   
                                                    QT interval,   
electrocardiogram         new path ms               Invalid   
Interpretation   
Code                                                [employer] [facility]  
Work Phone:   
[phone]  
   
                                QTc Remy      451 ms          Invalid   
Interpretation   
Code                                                [employer] Phone:   
[phone]  
   
                                T Axis          90 deg          Invalid   
Interpretation   
Code                                                [employer] Phone:   
[phone]  
   
                                                    T wave axis,   
electrocardiogram         90 deg                    Invalid   
Interpretation   
Code                                                [employer] Phone:   
[phone]  
   
                                                    Clinical Lists Update: Prelo  
adon 2017   
   
                                                    Left ventricular   
Ejection fraction         55 %                      Invalid   
Interpretation   
Code                                                [employer] Phone:   
[phone]  
   
                                                    External Other: [facility] [facility]  
thod of Contacton 2017   
   
                                methcontact     secmsg          Invalid   
Interpretation   
Code                                                [employer] Phone:   
[phone]  
   
                                                    Patient's prefered   
method of contact         secmsg                    Invalid   
Interpretation   
Code                                                [employer] Phone:   
[phone]  
   
                                                    Office Visiton 2017   
   
                                                    Documentation of   
current medications   
(procedure)               Done                      Invalid   
Interpretation   
Code                                                [employer] Phone:   
[phone]  
   
                                Fall risk assessment No              Invalid   
Interpretation   
Code                                                [employer] Phone:   
[phone]  
   
                      Protein mass conc Done                             [employer] Phone:   
[phone]  
   
                                                    Replaced Document: [facility]  
CG Observationson 2017   
   
                                        BUN (urea nitrogen) Sinus Rhythm - frequ  
ent   
ectopic ventricular beat   
s # VECs = 3-RSR(V1)   
-incomplete right bundle   
branch block and anterior   
fascicular block. -   
Nonspecific   
T-abnormality. Low   
voltage with rightward   
P-axis and rotation   
-possible pulmonary   
disease. ABNORMAL                       Invalid   
Interpretation   
Code                                                [employer] Phone:   
[phone]  
   
                      EKG QRS axis -53 deg                          [employer] Phone:   
[phone]  
[phone]  
   
                                                    GE use only - for   
[employer] import   
when terms are not   
otherwise specified       463 ms                    Invalid   
Interpretation   
Code                                                [facility]  
Work Phone:   
[phone]  
[phone]  
   
                      P Axis     64 deg                           [employer]  
Work Phone:   
[phone]  
   
                                                    P wave axis,   
electrocardiogram         64 deg                    Invalid   
Interpretation   
Code                                                [employer]  
Work Phone:   
[phone]  
   
                      VT Interval 190 ms                           [employer]  
Work Phone:   
[phone]  
   
                                                    VT interval,   
electrocardiogram         190 ms                    Invalid   
Interpretation   
Code                                                [employer]  
Work Phone:   
[phone]  
   
                                Pulse (Heart Rate) 72 /min         Invalid   
Interpretation   
Code                                                [employer]  
Work Phone:   
[phone]  
   
                                                    QRS axis,   
electrocardiogram         -53 deg                   Invalid   
Interpretation   
Code                                                [employer] Phone:   
[phone]  
   
                      QRS Duration 110 ms                           [facility]  
Work Phone:   
[phone]  
   
                                                    QRS duration,   
electrocardiogram         110 ms                    Invalid   
Interpretation   
Code                                                [employer]  
Work Phone:   
[phone]  
   
                      QT Interval new path ms                       [facility]  
Work Phone:   
[phone]  
   
                                                    QT interval,   
electrocardiogram         new path ms               Invalid   
Interpretation   
Code                                                [employer]  
Work Phone:   
[phone]  
   
                      QTc Remy 463 ms                           [facility]  
Work Phone:   
[phone]  
[phone]  
   
                      T Axis     90 deg                           [employer] [facility]  
Work Phone:   
[phone]  
   
                                                    T wave axis,   
electrocardiogram         90 deg                    Invalid   
Interpretation   
Code                                                [employer] [facility]  
Work Phone:   
[phone]  
[phone]  
   
                                                    Urea nitrogen mass   
conc                                    Sinus Rhythm - frequent   
ectopic ventricular beat   
s # VECs = 3-RSR(V1)   
-incomplete right bundle   
branch block and anterior   
fascicular block. -   
Nonspecific   
T-abnormality. Low   
voltage with rightward   
P-axis and rotation   
-possible pulmonary   
disease. ABNORMAL                                           [facility]  
Work Phone:   
[phone]  
   
                                                    Clinical Lists Update: 2017   
   
                                                    Left ventricular   
Ejection fraction 65 %                                            [facility]  
Work Phone:   
[phone]  
  
   
                                                    Tobacco smoking   
status NHIS               Former smoker             Invalid   
Interpretation   
Code                                                [facility]  
Work Phone:   
[phone]  
  
   
                                Tobacco use CPHS Former smoker   Invalid   
Interpretation   
Code                                                [facility]  
Work Phone:   
[phone]  
   
                                                    Clinical Lists Update: Lana jenkins 2016   
   
                                                    Cholesterol in HDL   
mass conc                 48 mg/dL                  Invalid   
Interpretation   
Code                                                [facility]  
Work Phone:   
[phone]  
[phone]  
   
                                                    Cholesterol in LDL   
mass conc                 133 mg/dL                 Invalid   
Interpretation   
Code                                                [facility]   
[facility]  
Work Phone:   
[phone]  
[phone]  
   
                                                    Cholesterol mass   
conc                      206 mg/dL                 Invalid   
Interpretation   
Code                                                [facility]  
Work Phone:   
[phone]  
[phone]  
   
                                                    Triglyceride mass   
conc                      110 mg/dL                 Invalid   
Interpretation   
Code                                                [facility]  
Work Phone:   
[phone]  
[phone]  
  
  
  
Vital Signs  
  
  
                      Date Time  Vital Sign Value      Performing Clinician Alexia siddiqi  
   
                                                    2025   
09:                              Diastolic blood   
pressure                  67 mm[Hg]                 Eber Hortensia DO  
Work Phone:   
[phone]                          [facility]  
   
                                                    Comment on   
above:                                  at home reader   
   
                                                    2025   
09:                              Systolic blood   
pressure                  103 mm[Hg]                Eber Hortensia DO  
Work Phone:   
[phone]                          [facility]  
   
                                                    Comment on   
above:                                  at home reader   
   
                                                    2025   
09:          Body height         180.3 cm            Eber Hortensia DO  
Work Phone:   
[phone]                          [facility]  
   
                                                    2025   
09:                              Body mass index   
(BMI) [Ratio]             26.36 kg/m2               Eber Hortensia DO  
Work Phone:   
[phone]                          [facility]  
   
                                                    2025   
09:          Body temperature    98.1 [degF]         Eber Hortensia DO  
Work Phone:   
[phone]                          [facility]  
   
                                                    2025   
09:          Body weight         85.73 kg            Eber Hortensia DO  
Work Phone:   
[phone]                          [facility]  
   
                                                    2025   
09:          Heart rate          84 /min             Eber Hortensia DO  
Work Phone:   
[phone]                          [facility]  
   
                                                    2025   
09:          Respiratory rate    15 /min             Eber Hortensia DO  
Work Phone:   
[phone]                          [facility]  
   
                                                    2025   
09:                              SaO2% (BldA) [Mass   
fraction]                 95 %                      Eber Hortensia DO  
Work Phone:   
[phone]                          [facility]  
   
                                                    2025   
07:          Body height         180.3 cm            Sabino Mahoney MD  
Work Phone:   
[phone]                          [facility]  
   
                                                    2025   
07:                              Body mass index   
(BMI) [Ratio]             25.38 kg/m2               Sabino Mahoney MD  
Work Phone:   
[phone]                          [facility]  
   
                                                    2025   
07:          Body temperature    98.4 [degF]         Sabino Mahoney MD  
Work Phone:   
[phone]                          [facility]  
   
                                                    2025   
07:          Body weight         82.56 kg            Sabino Mahoney MD  
Work Phone:   
[phone]                          [facility]  
   
                                                    2024   
12:          Body height         180.3 cm            Karolyn Daly MD  
Work Phone:   
[phone]                          [facility]  
   
                                                    2024   
12:                              Body mass index   
(BMI) [Ratio]             25.94 kg/m2               Karolyn Daly MD  
Work Phone:   
[phone]                          [facility]  
   
                                                    2024   
12:          Body temperature    98.1 [degF]         Karolyn Daly MD  
Work Phone:   
[phone]                          [facility]  
   
                                                    2024   
12:          Body weight         84.37 kg            Karolyn Daly MD  
Work Phone:   
[phone]                          [facility]  
   
                                                    2024   
12:                              Diastolic blood   
pressure                  62 mm[Hg]                 Karolyn Daly MD  
Work Phone:   
[phone]                          [facility]  
   
                                                    2024   
12:          Heart rate          78 /min             Karolyn Daly MD  
Work Phone:   
[phone]                          [facility]  
   
                                                    2024   
12:          Respiratory rate    16 /min             Karolyn Daly MD  
Work Phone:   
[phone]                          [facility]  
   
                                                    2024   
12:                              SaO2% (BldA) [Mass   
fraction]                 95 %                      Karolyn Daly MD  
Work Phone:   
[phone]                          [facility]  
   
                                                    2024   
12:                              Systolic blood   
pressure                  92 mm[Hg]                 Karolyn Daly MD  
Work Phone:   
[phone]                          [facility]  
   
                                                    2024   
12:                              Body mass index   
(BMI) [Ratio]       26.93 kg/m2         [facility]  
   
                                                    2024   
12:      Body weight     84.37 kg        Room [facility]  
   
                                                    2024   
11:      Body temperature 97.9 [degF]     [facility]  
   
                                                    2024   
11:                              Diastolic blood   
pressure            79 mm[Hg]           Room [facility]  
   
                                                    2024   
11:      Heart rate      69 /min         Room [facility]  
   
                                                    2024   
11:      Respiratory rate 16 /min         Room [facility]  
   
                                                    2024   
11:                              SaO2% (BldA) [Mass   
fraction]           94 %                Room [facility]  
   
                                                    2024   
11:                              Systolic blood   
pressure            127 mm[Hg]          [facility]  
   
                                                    10-   
10:          Body height         177 cm              Manuel Avelar MD  
Work Phone:   
[phone]                          [facility]  
   
                                                    10-   
10:                              Body mass index   
(BMI) [Ratio]             27.15 kg/m2               Manuel Avelar MD  
Work Phone:   
[phone]                          [facility]  
   
                                                    10-   
10:          Body weight         85.05 kg            Manuel Avelar MD  
Work Phone:   
[phone]                          [facility]  
   
                                                    10-   
10:                              Diastolic blood   
pressure                  75 mm[Hg]                 Manuel Avelar MD  
Work Phone:   
[phone]                          [facility]  
   
                                                    10-   
10:          Heart rate          86 /min             Manuel Avelar MD  
Work Phone:   
[phone]                          [facility]  
   
                                                    10-   
10:                              Systolic blood   
pressure                  117 mm[Hg]                Manuel Avelar MD  
Work Phone:   
[phone]                          [facility]  
   
                                                    07-   
09:                              Body mass index   
(BMI) [Ratio]             26.18 kg/m2               Jake Jones MD  
Work Phone:   
[phone]                          [facility]  
   
                                                    07-   
09:          Body weight         85.73 kg            Jake Jones MD  
Work Phone:   
[phone]                          [facility]  
   
                                                    07-   
09:                              Diastolic blood   
pressure                  78 mm[Hg]                 Jake Jones MD  
Work Phone:   
[phone]                          [facility]  
   
                                                    07-   
09:          Heart rate          76 /min             Jake Jones MD  
Work Phone:   
[phone]                          [facility]  
   
                                                    07-   
09:                              SaO2% (BldA) [Mass   
fraction]                 97 %                      Jake Jones MD  
Work Phone:   
[phone]                          [facility]  
   
                                                    07-   
09:                              Systolic blood   
pressure                  120 mm[Hg]                Jake Jones MD  
Work Phone:   
[phone]                          [facility]  
   
                                                    2024   
15:                              Body mass index   
(BMI) [Ratio]             26.62 kg/m2               Kaley Vicente APRN-CNP  
Work Phone:   
[phone]                          [facility]  
   
                                                    2024   
15:          Body weight         87.2 kg             Kaley Vicente APRN-CNP  
Work Phone:   
[phone]                          [facility]  
   
                                                    2024   
15:                              Diastolic blood   
pressure                  68 mm[Hg]                 Kaley Vicente APRN-CNP  
Work Phone:   
[phone]                          [facility]  
   
                                                    2024   
15:          Heart rate          72 /min             Kaley Vicente APRN-CNP  
Work Phone:   
[phone]                          [facility]  
   
                                                    2024   
15:          Respiratory rate    16 /min             Kaley Vicente APRN-CNP  
Work Phone:   
[phone]                          [facility]  
   
                                                    2024   
15:                              Systolic blood   
pressure                  122 mm[Hg]                Kaley Vicente APRN-CNP  
Work Phone:   
[phone]                          [facility]  
   
                                                    2024   
07:                              Body mass index   
(BMI) [Ratio]             26.45 kg/m2               Jake Jones MD  
Work Phone:   
[phone]                          [facility]  
   
                                                    2024   
07:          Body weight         86.64 kg            Jake Jones MD  
Work Phone:   
[phone]                          [facility]  
   
                                                    2024   
07:                              Diastolic blood   
pressure                  78 mm[Hg]                 Jake Jones MD  
Work Phone:   
[phone]                          [facility]  
   
                                                    2024   
07:          Heart rate          73 /min             Jake Jones MD  
Work Phone:   
[phone]                          [facility]  
   
                                                    2024   
07:                              SaO2% (BldA) [Mass   
fraction]                 97 %                      Jake Jones MD  
Work Phone:   
[phone]                          [facility]  
   
                                                    2024   
07:                              Systolic blood   
pressure                  124 mm[Hg]                Jake Jones MD  
Work Phone:   
[phone]                          [facility]  
   
                                                    01-   
09:          Body height         181 cm              Jake Jones MD  
Work Phone:   
[phone]                          [facility]  
   
                                                    01-   
09:                              Body mass index   
(BMI) [Ratio]             25.76 kg/m2               Jake Jones MD  
Work Phone:   
[phone]                          [facility]  
   
                                                    01-   
09:          Body weight         84.37 kg            Jake Jones MD  
Work Phone:   
[phone]                          [facility]  
   
                                                    01-   
09:                              Diastolic blood   
pressure                  80 mm[Hg]                 Jake Jones MD  
Work Phone:   
[phone]                          [facility]  
   
                                                    01-   
09:          Heart rate          64 /min             Jake Jones MD  
Work Phone:   
[phone]                          [facility]  
   
                                                    01-   
09:                              SaO2% (BldA) [Mass   
fraction]                 96 %                      Jake Jones MD  
Work Phone:   
[phone]                          [facility]  
   
                                                    01-   
09:                              Systolic blood   
pressure                  128 mm[Hg]                Jake Jones MD  
Work Phone:   
[phone]                          [facility]  
   
                                                    2023   
09:          Body height         180.3 cm            Jake Jones MD  
Work Phone:   
[phone]                          [facility]  
   
                                                    2023   
09:                              Body mass index   
(BMI) [Ratio]             23.57 kg/m2               Jake Jones MD  
Work Phone:   
[phone]                          [facility]  
   
                                                    2023   
09:          Body weight         76.66 kg            Jake Jones MD  
Work Phone:   
[phone]                          [facility]  
   
                                                    2023   
09:                              Diastolic blood   
pressure                  68 mm[Hg]                 Jake Jones MD  
Work Phone:   
[phone]                          [facility]  
   
                                                    2023   
09:          Heart rate          65 /min             Jake Jones MD  
Work Phone:   
[phone]                          [facility]  
   
                                                    2023   
09:                              SaO2% (BldA) [Mass   
fraction]                 98 %                      Jake Jones MD  
Work Phone:   
[phone]                          [facility]  
   
                                                    2023   
09:                              Systolic blood   
pressure                  122 mm[Hg]                Jake Jones MD  
Work Phone:   
[phone]                          [facility]  
   
                                                    2023   
10:          Body height         181.6 cm            Kaley STRANGE-CNP  
Work Phone:   
[phone]                          [facility]  
   
                                                    2023   
10:                              Body mass index   
(BMI) [Ratio]             24.37 kg/m2               Kaley STRANGE-CNP  
Work Phone:   
[phone]                          [facility]  
   
                                                    2023   
10:          Body weight         80.38 kg            Kaley Vicente APRN-CNP  
Work Phone:   
[phone]                          [facility]  
   
                                                    2023   
10:                              Diastolic blood   
pressure                  70 mm[Hg]                 Kaley Vicente APRN-CNP  
Work Phone:   
[phone]                          [facility]  
   
                                                    2023   
10:          Heart rate          48 /min             Kaley Vicente APRN-CNP  
Work Phone:   
[phone]                          [facility]  
   
                                                    2023   
10:                              Systolic blood   
pressure                  110 mm[Hg]                Kaley Vicente APRN-CNP  
Work Phone:   
[phone]                          [facility]  
   
                                                    2023   
10:          Body height         181.61 cm           Jake O Jones  
Work Phone:   
[phone]                          [facility]  
Work Phone:   
[phone]  
   
                                                    2023   
10:                              Body mass index   
(BMI) [Ratio]             23.93 kg/m2               Jake O Jones  
Work Phone:   
[phone]                          [facility]  
Work Phone:   
[phone]  
   
                                                    2023   
10:                              Body surface area   
Derived from   
formula                   2 m2                      Jake O Jones  
Work Phone:   
[phone]                          [facility]  
Work Phone:   
[phone]  
   
                                                    2023   
10:          Body weight         78.93 kg            Jake O Jones  
Work Phone:   
[phone]                          [facility]  
Work Phone:   
[phone]  
   
                                                    2023   
10:                              Diastolic blood   
pressure                  76 mm[Hg]                 Jake O Jones  
Work Phone:   
[phone]                          [facility]  
Work Phone:   
[phone]  
   
                                                    2023   
10:          Heart rate          68 /min             Jake O Jones  
Work Phone:   
[phone]                          [facility]  
Work Phone:   
[phone]  
   
                                                    2023   
10:                              Systolic blood   
pressure                  118 mm[Hg]                Jake O Jones  
Work Phone:   
[phone]                          [facility]  
Work Phone:   
[phone]  
   
                                                    10-   
10:          Body height         182.88 cm           Jake O Jones  
Work Phone:   
[phone]                          [facility]  
Work Phone:   
[phone]  
   
                                                    10-   
10:                              Body mass index   
(BMI) [Ratio]             23.33 kg/m2               Jake O Jones  
Work Phone:   
[phone]                          [facility]  
Work Phone:   
[phone]  
   
                                                    10-   
10:                              Body surface area   
Derived from   
formula                   2 m2                      Jake O Jones  
Work Phone:   
[phone]                          [facility]  
Work Phone:   
[phone]  
   
                                                    10-   
10:          Body weight         78.02 kg            Jake O Jones  
Work Phone:   
[phone]                          [facility]  
Work Phone:   
[phone]  
   
                                                    10-   
10:                              Diastolic blood   
pressure                  70 mm[Hg]                 Jake O Jones  
Work Phone:   
[phone]                          [facility]  
Work Phone:   
[phone]  
   
                                                    10-   
10:          Heart rate          68 /min             Jake O Jones  
Work Phone:   
[phone]                          [facility]  
Work Phone:   
[phone]  
   
                                                    10-   
10:                              Systolic blood   
pressure                  112 mm[Hg]                Jake O Jones  
Work Phone:   
[phone]                          [facility]  
Work Phone:   
[phone]  
   
                                                    10-   
09:          Body height         182.88 cm           Jake O Jones  
Work Phone:   
[phone]                          [facility]  
Work Phone:   
[phone]  
   
                                                    10-   
09:                              Body mass index   
(BMI) [Ratio]             23.46 kg/m2               Jake O Jones  
Work Phone:   
[phone]                          [facility]  
Work Phone:   
[phone]  
   
                                                    10-   
09:                              Body surface area   
Derived from   
formula                   2 m2                      Jake O Jones  
Work Phone:   
[phone]                          [facility]  
Work Phone:   
[phone]  
   
                                                    10-   
09:          Body weight         78.47 kg            Jake O Jones  
Work Phone:   
[phone]                          [facility]  
Work Phone:   
[phone]  
   
                                                    10-   
14:          Body height         182.88 cm           Jake O Jones  
Work Phone:   
[phone]                          [facility]  
Work Phone:   
[phone]  
   
                                                    10-   
14:                              Body mass index   
(BMI) [Ratio]             23.46 kg/m2               Jake O Jones  
Work Phone:   
[phone]                          [facility]  
Work Phone:   
[phone]  
   
                                                    10-   
14:                              Body surface area   
Derived from   
formula                   2 m2                      Jake O Jones  
Work Phone:   
[phone]                          [facility]  
Work Phone:   
[phone]  
   
                                                    10-   
14:          Body weight         78.47 kg            Jake O Jones  
Work Phone:   
[phone]                          [facility]  
Work Phone:   
[phone]  
   
                                                    10-   
14:                              Diastolic blood   
pressure                  82 mm[Hg]                 Jake O Jones  
Work Phone:   
[phone]                          [facility]  
Work Phone:   
[phone]  
   
                                                    10-   
14:          Heart rate          64 /min             Jake O Jones  
Work Phone:   
[phone]                          [facility]  
Work Phone:   
[phone]  
   
                                                    10-   
14:                              Systolic blood   
pressure                  122 mm[Hg]                Jake O Jones  
Work Phone:   
[phone]                          [facility]  
Work Phone:   
[phone]  
   
                                                    2022   
08:          Body height         182.88 cm           Jake O Jones  
Work Phone:   
[phone]                          [facility]  
Work Phone:   
[phone]  
   
                                                    2022   
08:                              Body mass index   
(BMI) [Ratio]             23.33 kg/m2               Jake O Jones  
Work Phone:   
[phone]                          [facility]  
Work Phone:   
[phone]  
   
                                                    2022   
08:                              Body surface area   
Derived from   
formula                   2 m2                      Jake O Jones  
Work Phone:   
[phone]                          [facility]  
Work Phone:   
[phone]  
   
                                                    2022   
08:          Body weight         78.02 kg            Jake O Jones  
Work Phone:   
[phone]                          [facility]  
Work Phone:   
[phone]  
   
                                                    2022   
08:                              Diastolic blood   
pressure                  81 mm[Hg]                 Jake O Jones  
Work Phone:   
[phone]                          [facility]  
Work Phone:   
[phone]  
   
                                                    2022   
08:          Heart rate          74 /min             Jake O Jones  
Work Phone:   
[phone]                          [facility]  
Work Phone:   
[phone]  
   
                                                    2022   
08:          Respiratory rate    16 /min             Jake O Jones  
Work Phone:   
[phone]                          [facility]  
Work Phone:   
[phone]  
   
                                                    2022   
08:                              Systolic blood   
pressure                  123 mm[Hg]                Jake O Jones  
Work Phone:   
[phone]                          [facility]  
Work Phone:   
[phone]  
   
                                                    2022   
10:          Body height         182.88 cm           Jake O Jones  
Work Phone:   
[phone]                          [facility]  
Work Phone:   
[phone]  
   
                                                    2022   
10:                              Body mass index   
(BMI) [Ratio]             23.94 kg/m2               Jake O Jones  
Work Phone:   
[phone]                          [facility]  
Work Phone:   
[phone]  
   
                                                    2022   
10:                              Body surface area   
Derived from   
formula                   2.02 m2                   Jake O Jones  
Work Phone:   
[phone]                          [facility]  
Work Phone:   
[phone]  
   
                                                    2022   
10:          Body weight         80.06 kg            Jake O Jones  
Work Phone:   
[phone]                          [facility]  
Work Phone:   
[phone]  
   
                                                    2022   
10:                              Diastolic blood   
pressure                  81 mm[Hg]                 Jake O Jones  
Work Phone:   
[phone]                          [facility]   
[facility]  
Work Phone:   
[phone]  
   
                                                    2022   
10:          Heart rate          59 /min             Jake O Jones  
Work Phone:   
[phone]                          [facility]  
Work Phone:   
[phone]  
   
                                                    2022   
10:                              Systolic blood   
pressure                  127 mm[Hg]                Jake O Jones  
Work Phone:   
[phone]                          [facility]  
Work Phone:   
[phone]  
   
                                                    2022   
09:          Body height         182.88 cm           Jake O Jones  
Work Phone:   
[phone]                          [facility]  
Work Phone:   
[phone]  
   
                                                    2022   
09:                              Body mass index   
(BMI) [Ratio]             24.02 kg/m2               Jake O Jones  
Work Phone:   
[phone]                          [facility]  
Work Phone:   
[phone]  
   
                                                    2022   
09:                              Body surface area   
Derived from   
formula                   2.02 m2                   Jake O Jones  
Work Phone:   
[phone]                          [facility]  
Work Phone:   
[phone]  
   
                                                    2022   
09:          Body weight         80.35 kg            Jake Jones  
Work Phone:   
[phone]                          [facility]  
Work Phone:   
[phone]  
   
                                                    2022   
09:                              Diastolic blood   
pressure                  72 mm[Hg]                 Jake Brunnerer  
Work Phone:   
[phone]                          [facility]  
Work Phone:   
[phone]  
   
                                                    2022   
09:          Heart rate          63 /min             Jake Jones  
Work Phone:   
[phone]                          [facility]  
Work Phone:   
[phone]  
   
                                                    2022   
09:                              Systolic blood   
pressure                  120 mm[Hg]                Jake Jones  
Work Phone:   
[phone]                          [facility]  
Work Phone:   
[phone]  
   
                                                    2022   
09:          Body height         182.88 cm           Jake Jones  
Work Phone:   
[phone]                          [facility]  
Work Phone:   
[phone]  
   
                                                    2022   
09:                              Body mass index   
(BMI) [Ratio]             25.77 kg/m2               Jake Jones  
Work Phone:   
[phone]                          [facility]  
Work Phone:   
[phone]  
   
                                                    2022   
09:                              Body surface area   
Derived from   
formula                   2.08 m2                   Jake Brunnerer  
Work Phone:   
[phone]                          [facility]   
[facility]  
Work Phone:   
[phone]  
   
                                                    2022   
09:          Body temperature    97.8 [degF]         Jake Brunnerer  
Work Phone:   
[phone]                          [facility]   
[facility]  
Work Phone:   
[phone]  
   
                                                    2022   
09:          Body weight         86.18 kg            Jake Velasquezyder  
Work Phone:   
[phone]                          [facility]   
[facility]  
Work Phone:   
[phone]  
   
                                                    2022   
11:          Body height         182.88 cm           Jake O Jones  
Work Phone:   
[phone]                          [facility]  
Work Phone:   
[phone]  
   
                                                    2022   
11:                              Body mass index   
(BMI) [Ratio]             25.77 kg/m2               Jake O Jones  
Work Phone:   
[phone]                          [facility]  
Work Phone:   
[phone]  
   
                                                    2022   
11:                              Body surface area   
Derived from   
formula                   2.08 m2                   Jake O Jones  
Work Phone:   
[phone]                          [facility]  
Work Phone:   
[phone]  
   
                                                    2022   
11:          Body weight         86.18 kg            Jake O Jones  
Work Phone:   
[phone]                          [facility]  
Work Phone:   
[phone]  
   
                                                    2022   
11:                              Diastolic blood   
pressure                  70 mm[Hg]                 Jake O Jones  
Work Phone:   
[phone]                          [facility]  
Work Phone:   
[phone]  
   
                                                    2022   
11:          Heart rate          60 /min             Jake O Jones  
Work Phone:   
[phone]                          [facility]  
Work Phone:   
[phone]  
   
                                                    2022   
11:                              Systolic blood   
pressure                  120 mm[Hg]                Jake O Jones  
Work Phone:   
[phone]                          [facility]  
Work Phone:   
[phone]  
   
                                                    2022   
09:          Body height         182.88 cm           Jake O Jones  
Work Phone:   
[phone]                          [facility]   
[facility]  
[address]  
Work Phone:   
[phone]  
   
                                                    2022   
09:                              Body mass index   
(BMI) [Ratio]             25.5 kg/m2                Jake O Jones  
Work Phone:   
[phone]                          [facility]   
[facility]  
[address]  
Work Phone:   
[phone]  
   
                                                    2022   
09:                              Body surface area   
Derived from   
formula                   2.08 m2                   Jake Jones  
Work Phone:   
[phone]                          [facility]   
[facility]  
[address]  
Work Phone:   
[phone]  
   
                                                    2022   
09:          Body weight         85.28 kg            Jake LINDA Jones  
Work Phone:   
[phone]                          [facility]   
[facility]  
[address]  
Work Phone:   
[phone]  
   
                                                    2022   
09:                              Diastolic blood   
pressure                  78 mm[Hg]                 Jake Jones  
Work Phone:   
[phone]                          [facility]   
[facility]  
[address]  
Work Phone:   
[phone]  
   
                                                    2022   
09:                              Systolic blood   
pressure                  122 mm[Hg]                Jake Jones  
Work Phone:   
[phone]                          [facility]   
[facility]  
[address]  
Work Phone:   
[phone]  
   
                                                    2022   
08:          Body height         182.88 cm           Jake Jones  
Work Phone:   
[phone]                          [facility]   
[facility] and   
[facility] [address]  
Work Phone:   
[phone]  
   
                                                    2022   
08:                              Body mass index   
(BMI) [Ratio]             25.68 kg/m2               Jake Jones  
Work Phone:   
[phone]                          [facility]   
[facility] and   
[facility] [address]  
Work Phone:   
[phone]  
   
                                                    2022   
08:                              Body surface area   
Derived from   
formula                   2.08 m2                   Jake Jones  
Work Phone:   
[phone]                          [facility]   
[facility] and   
[facility] [address]  
Work Phone:   
[phone]  
   
                                                    2022   
08:          Body temperature    97.8 [degF]         Jake Jones  
Work Phone:   
[phone]                          [facility]   
Or[facility] and   
[facility] [address]  
Work Phone:   
[phone]  
   
                                                    2022   
08:          Body weight         85.9 kg             Jake O Jones  
Work Phone:   
[phone]                          [facility]   
Or[facility]opedics and   
[facility] [address]  
Work Phone:   
[phone]  
   
                                                    2021   
10:          Body height         182.88 cm           Jake O Jones  
Work Phone:   
[phone]                          [facility]  
Work Phone:   
[phone]  
   
                                                    2021   
10:                              Body mass index   
(BMI) [Ratio]             25.5 kg/m2                Jake O Jones  
Work Phone:   
[phone]                          [facility]  
Work Phone:   
[phone]  
   
                                                    2021   
10:                              Body surface area   
Derived from   
formula                   2.08 m2                   Jake O Jones  
Work Phone:   
[phone]                          [facility]  
Work Phone:   
[phone]  
   
                                                    2021   
10:          Body weight         85.28 kg            Jake LINDA VelasquezJones  
Work Phone:   
[phone]                          [facility]  
Work Phone:   
[phone]  
   
                                                    2021   
10:                              Diastolic blood   
pressure                  58 mm[Hg]                 Jake O Jones  
Work Phone:   
[phone]                          [facility]  
Work Phone:   
[phone]  
   
                                                    2021   
10:          Heart rate          60 /min             Jake O Jones  
Work Phone:   
[phone]                          [facility]  
Work Phone:   
[phone]  
   
                                                    2021   
10:                              Systolic blood   
pressure                  112 mm[Hg]                Jake O Jones  
Work Phone:   
[phone]                          [facility]  
Work Phone:   
[phone]  
   
                                                    11-   
10:          Body height         180.34 cm           Jake O Jones  
Work Phone:   
[phone]                          [facility]   
[facility]  
[address]  
Work Phone:   
[phone]  
   
                                                    11-   
10:                              Body mass index   
(BMI) [Ratio]             25.24 kg/m2               Jake Jones  
Work Phone:   
[phone]                          -[facility]   
[facility]  
[address]  
Work Phone:   
[phone]  
   
                                                    11-   
10:                              Body surface area   
Derived from   
formula                   2.02 m2                   Jake Jones  
Work Phone:   
[phone]                          -[facility]   
[facility]  
[address]  
Work Phone:   
[phone]  
   
                                                    11-   
10:          Body temperature    97.1 [degF]         Jake Jonse  
Work Phone:   
[phone]                          -[facility]   
[facility]  
[address]  
Work Phone:   
[phone]  
   
                                                    11-   
10:          Body weight         82.1 kg             Jake Jones  
Work Phone:   
[phone]                          [facility]   
[facility]  
[address]  
Work Phone:   
[phone]  
   
                                                    11-   
10:                              Diastolic blood   
pressure                  72 mm[Hg]                 Jake Jones  
Work Phone:   
[phone]                          [facility]   
[facility]  
[address]  
Work Phone:   
[phone]  
   
                                                    11-   
10:                              Systolic blood   
pressure                  124 mm[Hg]                Jake Jones  
Work Phone:   
[phone]                          -[facility]   
[facility]  
[address]  
Work Phone:   
[phone]  
   
                                                    10-   
13:          Body height         180.34 cm           Jake Jones  
Work Phone:   
[phone]                          -[facility]   
[facility]  
[address]  
Work Phone:   
[phone]  
   
                                                    10-   
13:                              Body mass index   
(BMI) [Ratio]             25.24 kg/m2               Jake Jones  
Work Phone:   
[phone]                          -[facility]   
[facility]  
[address]  
Work Phone:   
[phone]  
   
                                                    10-   
13:                              Body surface area   
Derived from   
formula                   2.02 m2                   Jake Jones  
Work Phone:   
[phone]                          -[facility]   
[facility]  
[address]  
Work Phone:   
[phone]  
   
                                                    10-   
13:          Body temperature    97.5 [degF]         Jake Brunnerer  
Work Phone:   
[phone]                          [facility]   
[facility]  
[address]  
Work Phone:   
[phone]  
   
                                                    10-   
13:          Body weight         82.1 kg             Jake Brunnerer  
Work Phone:   
[phone]                          [facility]   
[facility]  
[address]  
Work Phone:   
[phone]  
   
                                                    10-   
13:                              Diastolic blood   
pressure                  70 mm[Hg]                 Jake Brunnerer  
Work Phone:   
[phone]                          [facility]   
[facility]  
[address]  
Work Phone:   
[phone]  
   
                                                    10-   
13:                              Systolic blood   
pressure                  110 mm[Hg]                Jake Velasquezyder  
Work Phone:   
[phone]                          [facility]   
[facility]  
[address]  
Work Phone:   
[phone]  
   
                                                    2021   
09:          Body height         180.34 cm           Jake Brunnerer  
Work Phone:   
[phone]                          [facility]   
[facility]  
[address]  
Work Phone:   
[phone]  
   
                                                    2021   
09:                              Body mass index   
(BMI) [Ratio]             25.24 kg/m2               Jake Velasquezyder  
Work Phone:   
[phone]                          [facility]   
[facility]  
[address]  
Work Phone:   
[phone]  
   
                                                    2021   
09:                              Body surface area   
Derived from   
formula                   2.02 m2                   Jake LINDA Jones  
Work Phone:   
[phone]                          [facility]   
[facility]  
[address]  
Work Phone:   
[phone]  
   
                                                    2021   
09:          Body temperature    97.5 [degF]         Jake LINDA Jones  
Work Phone:   
[phone]                          [facility]   
[facility]  
[address]  
Work Phone:   
[phone]  
   
                                                    2021   
09:          Body weight         82.1 kg             Jake O Jones  
Work Phone:   
[phone]                          [facility]   
[facility]  
[address]  
Work Phone:   
[phone]  
   
                                                    2021   
09:                              Diastolic blood   
pressure                  80 mm[Hg]                 Jake O Jonse  
Work Phone:   
[phone]                          [facility]   
[facility]  
[address]  
Work Phone:   
[phone]  
   
                                                    2021   
09:                              Systolic blood   
pressure                  110 mm[Hg]                Jake O Jones  
Work Phone:   
[phone]                          [facility]   
[facility]  
[address]  
Work Phone:   
[phone]  
   
                                                    06-   
08:          Body height         180.34 cm           Jake O Jones  
Work Phone:   
[phone]                          [facility]  
Work Phone:   
[phone]  
   
                                                    06-   
08:                              Body mass index   
(BMI) [Ratio]             26.08 kg/m2               Jake O Jones  
Work Phone:   
[phone]                          [facility]  
Work Phone:   
[phone]  
   
                                                    06-   
08:                              Body surface area   
Derived from   
formula                   2.05 m2                   Jake O Jones  
Work Phone:   
[phone]                          [facility]  
Work Phone:   
[phone]  
   
                                                    06-   
08:          Body weight         84.82 kg            Jake O Jones  
Work Phone:   
[phone]                          [facility]  
Work Phone:   
[phone]  
   
                                                    06-   
08:                              Diastolic blood   
pressure                  84 mm[Hg]                 Jake O Jones  
Work Phone:   
[phone]                          [facility]  
Work Phone:   
[phone]  
   
                                                    06-   
08:          Heart rate          72 /min             Jake O Jones  
Work Phone:   
[phone]                          [facility]  
Work Phone:   
[phone]  
   
                                                    06-   
08:                              Systolic blood   
pressure                  136 mm[Hg]                Jake O Jones  
Work Phone:   
[phone]                          [facility]  
Work Phone:   
[phone]  
   
                                                    2017   
13:                              BMI (Body Mass   
Index)              24.82 kg/m2         Jaydon Roth     [facility]  
Work Phone:   
[phone]  
   
                                                    2017   
13:      BP Diastolic    70 mm[Hg]       [facility]  
[facility]  
Work Phone:   
[phone]  
   
                                                    2017   
13:      BP Systolic     112 mm[Hg]      Jaydon Roth [facility]  
oup  
Work Phone:   
[phone]  
   
                                                    2017   
13:      Height          180.34 cm       Jaydon Roth [facility]  
oup  
Work Phone:   
[phone]  
   
                                                    2017   
13:      Pulse (Heart Rate) 68 /min         [facility]  
 [facility]  
Work Phone:   
[phone]  
   
                                                    2017   
13:      Respiratory Rate 16 /min         [facility]  
[facility]  
Work Phone:   
[phone]  
   
                                                    2017   
13:      Weight          80.74 kg        Jaydon Roth [facility]  
oup  
Work Phone:   
[phone]  
   
                                                    2017   
09:          Heart rate          71 /min             Ana Chase PA-C                                    [facility]  
Work Phone:   
[phone]  
   
                                                    2017   
14:      Heart rate      72 /min         Hailee Alexander   [facility]  
oup  
Work Phone:   
[phone]  
   
                                                    2017   
14:                              BMI (Body Mass   
Index)              24.13 kg/m2         Hailee Fiorellay       [facility]  
Work Phone:   
[phone]  
   
                                                    2017   
14:      BP Diastolic    82 mm[Hg]       Hailee Marthey   [facility]  
oup  
Work Phone:   
[phone]  
   
                                                    2017   
14:      BP Systolic     138 mm[Hg]      Hailee Marthey   [facility]  
oup  
Work Phone:   
[phone]  
   
                                                    2017   
14:      Height          180.34 cm       [facility]  
[facility]  
Work Phone:   
[phone]  
   
                                                    2017   
14:      Pulse (Heart Rate) 68 /min         [facility]  
 [facility]  
Work Phone:   
[phone]  
   
                                                    2017   
14:      Respiratory Rate 16 /min         [facility]  
roup  
Work Phone:   
[phone]  
   
                                                    2017   
14:      Weight          78.47 kg        [facility]  
[facility]  
Work Phone:   
[phone]  
  
  
  
Encounters  
  
  
                          Encounter Date Encounter Type Care Provider Facility  
   
                                                    Start: 2025  
End: 2025           ambulatory                EBER RODRIGUEZANDRAN                            [facility]  
   
                                                    Start: 2025  
End: 2025           Orders Only               Sabino Mahoney MD  
Work Phone:   
[phone]                          [facility]  
   
                                        Comment on above:   Oropharyngeal dyspha  
krystal (Primary Dx)   
   
                                                            Oropharyngeal dyspha  
krystal   
   
                                                    Start: 01-  
End: 01-           Orders Only               Eber Bazan DO  
Work Phone:   
[phone]                          [facility]  
   
                                        Comment on above:   Coronary artery dise  
ase involving native coronary artery of   
native   
heart without angina pectoris (Primary Dx);  
Acquired hypothyroidism   
   
                                                    Start: 2025  
End: 2025                         Patient encounter   
procedure                               Eber Bazan DO  
Work Phone:   
[phone]                          [facility]  
   
                                        Comment on above:   Medicare annual well  
ness visit, subsequent (Primary Dx);  
At moderate risk for fall;  
Abnormal finding of blood chemistry, unspecified;  
Need for hepatitis C screening test;  
Asthma-COPD overlap syndrome (HCC);  
Paroxysmal atrial fibrillation (HCC);  
Productive cough;  
Hypertension goal BP (blood pressure) < 140/90;  
Orthostatic hypotension;  
Acquired hypothyroidism;  
History of Frye's esophagus   
   
                                                    Start: 2025  
End: 2025           ambulatory                EBER BAZAN                            [facility]  
   
                                                    Start: 2025  
End: 2025                         Encounter for general   
adult medical   
examination without   
abnormal findings                       EBER RODRIGUEZANDRAN                            [facility]  
   
                                                    Start: 2025  
End: 2025           Documentation procedure   Eber Bazan DO  
Work Phone:   
[phone]                          [facility]   
[facility]  
   
                                        Comment on above:   [facility] OUTREACH (APPT O  
N 2025)   
   
                                                    Start: 2025  
End: 2025                         Office outpatient new 45   
minutes                                 Sabino Mahoney MD  
Work Phone:   
[phone]                          [facility]  
   
                                        Comment on above:   Chronic cough (Prima  
ry Dx);  
Oropharyngeal dysphagia;  
Tremor   
   
                                                    Start: 2025  
End: 2025           ambulatory                EBER REGAN   
[facility]  
   
                                                    Start: 2024  
End: 2024                         Office outpatient visit   
15 minutes                              Karolyn Daly MD  
Work Phone:   
[phone]                          [facility]  
   
                                        Comment on above:   Acute bronchitis, un  
specified organism (Primary Dx);  
Chronic rhinitis   
   
                                                    Start: 2024  
End: 2024           ambulatory                [facility] REGAN   
[facility]  
   
                                                    Start: 2024  
End: 2024                         Emergency department   
patient visit                           [facility] REGAN   
[facility]  
   
                                                    Start: 2024  
End: 2024     Orders Only         [facility]   
Ambulatory Care  
   
                                                    Start: 2024  
End: 2024           ambulatory                Manuel Avelar MD  
Work Phone:   
[phone]                          [facility]   
Ambulatory Care  
   
                                        Comment on above:   Osteoporosis, unspec  
ified osteoporosis type, unspecified   
pathological fracture presence (Primary Dx)   
   
                                                    Start: 2024  
End: 2024     Orders Only         [facility]   
Ambulatory Care  
   
                                                    Start: 2024  
End: 2024           ambulatory                [facility]  
   
                                                    Start: 2024  
End: 2024           [facility]                                    [facility]  
   
                                                    Start: 10-  
End: 10-           ambulatory                PROVIDER NOT IN   
SYSTEM                                  [facility]  
   
                                                    Start: 10-  
End: 10-                         Office outpatient new 60   
minutes                                 Eber Bazan DO  
Work Phone:   
[phone]                          [facility]   
Endocrinology   
Physicians  
   
                                        Comment on above:   Osteoporosis, unspec  
ified osteoporosis type, unspecified   
pathological fracture presence   
   
                                                    Start: 10-  
End: 10-           ambulatory                EBER STEVENSONICHANDRAN                            [facility]  
   
                                                    Start: 10-  
End: 10-           ambulatory                EBER STEVENSONICHCounts include 234 beds at the [facility]  
   
                                                    Start: 07-  
End: 07-                         Office outpatient visit   
40 minutes                              Jake Jones MD  
Work Phone:   
[phone]                          [facility]  
   
                                        Comment on above:   Actinic skin damage   
(Primary Dx);  
Elevated PSA;  
Paroxysmal atrial fibrillation (Multi);  
Atherosclerosis of native coronary artery of native heart without   
angina pectoris;  
Basal cell carcinoma (BCC) of skin of lower extremity including   
hip, unspecified laterality;  
Stenosis of right carotid artery;  
Centrilobular emphysema (Multi);  
Cervical spondylosis;  
Chronic rhinitis;  
COPD, severe (Multi);  
Pharyngoesophageal dysphagia;  
Essential tremor;  
Familial hyperlipidemia;  
Gastroesophageal reflux disease without esophagitis;  
Glaucoma of both eyes, unspecified glaucoma type;  
History of pulmonary embolism;  
Primary hypertension;  
Acquired hypothyroidism;  
Hypoxemia;  
Lentigines;  
Lumbar facet arthropathy;  
Localized osteoporosis without current pathological fracture;  
Prediabetes;  
Posterior tibial tendinitis of right lower extremity   
   
                                                    Start: 2024  
End: 2024                         Emergency department   
patient visit                           JOVANNI KERN                                 [facility]  
   
                                                    Start: 2024  
End: 2024                         Office outpatient visit   
15 minutes                              Kaley STRANGE-RAIN  
Work Phone:   
[phone]                          [facility]  
   
                                        Comment on above:   Other fatigue (Prima  
ry Dx);  
Pharyngitis, unspecified etiology   
   
                                                    Start: 2024  
End: 2024     ambulatory          [facility]  
   
                                                    Start: 2024  
End: 2024                         Emergency department   
patient visit                           ASAD QUIROZ                                [facility]  
   
                                                    Start: 2024  
End: 2024     ambulatory          [facility]  
   
Ambulatory  
   
                                                    Start: 2024  
End: 2024                         Office outpatient visit   
15 minutes                              Jake Jones MD  
Work Phone:   
[phone]                          [facility]  
   
                                        Comment on above:   Skin lesions (Primar  
y Dx);  
Age related osteoporosis, unspecified pathological fracture   
presence   
   
                                                    Start: 2024  
End: 2024     [facility]  
   
Ambulatory  
   
                                                    Start: 2024  
End: 2024     ambulatory          [facility]  
   
                                                    Start: 2024  
End: 2024     [facility]  
   
                                                    Start: 01-  
End: 2024     [facility]  
   
                                                    Start: 01-  
End: 01-     [facility]  
   
                                                    Start: 01-  
End: 01-                         Assay of hemosiderin,   
quant                                   Jake Jones MD  
Work Phone:   
[phone]                          [facility]  
Work Phone:   
[phone]  
   
                                                    Start: 01-  
End: 01-                         Patient encounter   
procedure                               Jake Jones MD  
Work Phone:   
[phone]                          [facility]  
   
                                        Comment on above:   Routine general medi  
vazquez examination at health care facility   
(Primary Dx);  
COPD, severe (CMS/HCC);  
Elevated PSA;  
Paroxysmal atrial fibrillation (CMS/HCC);  
Osteoporosis without current pathological fracture, unspecified   
osteoporosis type;  
Atherosclerosis of native coronary artery of native heart with   
angina pectoris (CMS/HCC);  
Basal cell carcinoma (BCC) of skin of other part of torso;  
Centrilobular emphysema (CMS/HCC);  
Cervical spondylosis;  
Chronic rhinitis;  
Pharyngoesophageal dysphagia;  
Essential tremor;  
Familial hyperlipidemia;  
Gastroesophageal reflux disease without esophagitis;  
Acquired hypothyroidism;  
Primary hypertension;  
History of pulmonary embolism;  
Glaucoma of both eyes, unspecified glaucoma type;  
Hypoxemia;  
Prediabetes;  
Seborrheic keratosis   
   
                                                    Start: 01-  
End: 01-     [facility]  
   
Ambulatory  
   
                                                    Start: 01-  
End: 01-                         Encounter for general   
adult medical   
examination without   
abnormal findings         [facility]   
Ambulatory  
   
                                                    Start: 2024  
End: 2024     ambulatory          [facility]  
   
                                                    Start: 10-  
End: 10-     ambulatory          [facility]  
   
Ambulatory  
   
                                                    Start: 2023  
End: 2023                         Office outpatient visit   
25 minutes                              Jake Jones MD  
Work Phone:   
[phone]                          [facility]  
   
                                        Comment on above:   Elevated PSA (Primar  
y Dx);  
Age related osteoporosis, unspecified pathological fracture   
presence;  
Basal cell carcinoma (BCC) of skin of other part of torso;  
Chronic rhinitis;  
Paroxysmal atrial fibrillation (CMS/HCC);  
Pharyngoesophageal dysphagia;  
COPD, severe (CMS/HCC);  
Centrilobular emphysema (CMS/HCC);  
Acute diastolic congestive heart failure (CMS/HCC);  
Familial hyperlipidemia;  
Gastroesophageal reflux disease without esophagitis;  
Prediabetes;  
Hypoxemia;  
Primary hypertension;  
Lumbar facet arthropathy;  
Cervical spondylosis;  
Glaucoma of both eyes, unspecified glaucoma type   
   
                                                    Start: 2023  
End: 2023     ambulatory          [facility]  
   
Ambulatory  
   
                                                    Start: 2023  
End: 2023                         Office outpatient visit   
25 minutes                              [facility]  
Work Phone:   
[phone]                          [facility]  
   
                                        Comment on above:   Nonintractable heada  
holden, unspecified chronicity pattern,   
unspecified headache type (Primary Dx);  
Shortness of breath on exertion;  
Atrial fibrillation, unspecified type (CMS/HCC);  
Sinus drainage   
   
                                        Start: 2023   Adv care pln tlkd &   
alt   
dcsn maker docd                         Jake Jones  
Work Phone:   
[phone]                          [facility]  
Work Phone:   
[phone]  
   
                          Start: 2023 ambulatory   Dr. Jake BARAKAT  
acility:9762  
   
                                Start: 2022 Chart Update    Jake Jones  
Work Phone:   
[phone]                          [facility]  
Work Phone:   
[phone]  
   
                                Start: 2022 AUDIT           Jake Jones  
Work Phone:   
[phone]                          [facility]  
Work Phone:   
[phone]  
   
                                        Start: 10-   Patient encounter   
procedure                               Jake Jones  
Work Phone:   
[phone]                          [facility]  
Work Phone:   
[phone]  
   
                          Start: 10- ambulatory   Dr. Jake BARAKAT  
acility:9762  
   
                                        Start: 10-   Patient encounter   
procedure                               Jake Jones  
Work Phone:   
[phone]                          [facility]  
Work Phone:   
[phone]  
   
                          Start: 10- ambulatory   Dr. Jake BARAKAT  
acility:[facility]  
   
                          Start: 10- ambulatory   Dr. Jake BARAKAT  
acility:9863  
   
                                Start: 10- Chart Update    Jake Jones  
Work Phone:   
[phone]                          [facility]  
Work Phone:   
[phone]  
   
                          Start: 10- ambulatory   Dr. Jake BARAKAT  
acility:[facility]  
   
                                        Start: 10-   Patient encounter   
procedure                               Jake Jones  
Work Phone:   
[phone]                          [facility]  
Work Phone:   
[phone]  
   
                          Start: 10- ambulatory   Dr. Jake BARAKAT  
acility:9762  
   
                          Start: 2022 ambulatory   Dr. Jake BARAKAT  
acility:9856  
   
                                Start: 2022 Chart Update    Jake Jones  
Work Phone:   
[phone]                          [facility]  
Work Phone:   
[phone]  
   
                                        Start: 2022   Office outpatient vi  
sit   
15 minutes                              Jake Jones  
Work Phone:   
[phone]                          [facility]  
Work Phone:   
[phone]  
   
                          Start: 2022 ambulatory   MsMervin Kaley Jeffersonemely Vicente Fa  
cility:9762  
   
                          Start: 2022 ambulatory   Dr. Jake BARAKAT  
acility:9856  
   
                                Start: 2022 Chart Update    Jake Jones  
Work Phone:   
[phone]                          [facility]  
Work Phone:   
[phone]  
   
                                        Start: 2022   Office outpatient vi  
sit   
15 minutes                              Jaek Jones  
Work Phone:   
[phone]                          [facility]  
Work Phone:   
[phone]  
   
                          Start: 2022 ambulatory   Dr. Jake BARAKAT  
acility:9863  
   
                                        Start: 2022   Postop follow up vis  
it   
related to original px                  Jake Jones  
Work Phone:   
[phone]                          [facility] [address]  
Work Phone:   
[phone]  
   
                                                    Start: 2022  
End: 2022           ambulatory                Dr. Amaury Gaffney                                  Facility:9509  
   
                                        Start: 2022   Patient encounter   
procedure                               Jake Jones  
Work Phone:   
[phone]                          [facility]  
Work Phone:   
[phone]  
   
                                Start: 2022 Chart Update    Jake Jones  
Work Phone:   
[phone]                          [facility]  
Work Phone:   
[phone]  
   
                                        Start: 2022   Office outpatient vi  
sit   
25 minutes                              Jake Jones  
Work Phone:   
[phone]                          [facility]  
Work Phone:   
[phone]  
   
                                        Start: 2022   Office outpatient vi  
sit   
15 minutes                              Jake Jones  
Work Phone:   
[phone]                          [facility]   
[facility]  
[address]  
Work Phone:   
[phone]  
   
                                        Start: 2022   Office outpatient ne  
w 30   
minutes                                 Jake Jones  
Work Phone:   
[phone]                          [facility]   
[facility] [address]  
Work Phone:   
[phone]  
   
                                Start: 2022 AUDIT           Jake Jones  
Work Phone:   
[phone]                          [facility]  
Work Phone:   
[phone]  
   
                                        Start: 2021   Office outpatient vi  
sit   
25 minutes                              Jake Brunnerer  
Work Phone:   
[phone]                          [facility]  
Work Phone:   
[phone]  
   
                                Start: 12- Chart Update    Jake Brunnerer  
Work Phone:   
[phone]                          [facility]  
Work Phone:   
[phone]  
   
                                Start: 2021 AUDIT           Jake Brunnerer  
Work Phone:   
[phone]                          [facility] [address]  
Work Phone:   
[phone]  
   
                                        Start: 11-   Office outpatient vi  
sit   
25 minutes                              Jake LINDA Brunnerer  
Work Phone:   
[phone]                          [facility] [address]  
Work Phone:   
[phone]  
   
                                Start: 2021 AUDIT           Jake Brunnerer  
Work Phone:   
[phone]                          [facility]  
Work Phone:   
[phone]  
   
                                        Start: 2021   Patient encounter   
procedure                               Jake Brunnerer  
Work Phone:   
[phone]                          [facility]  
Work Phone:   
[phone]  
   
                                        Start: 2021   POLINA, Provider:   
Jomar Casillas, Status: Pen,   
Time: 11:30 AM                          Jake Brunnerer  
Work Phone:   
[phone]                          [facility]  
Work Phone:   
[phone]  
   
                                Start: 2021 Chart Update    Jake Brunnerer  
Work Phone:   
[phone]                          [facility]   
[facility]  
Work Phone:   
[phone]  
   
                                Start: 10- AUDIT           Jake Brunnerer  
Work Phone:   
[phone]                          [facility]  
Work Phone:   
[phone]  
   
                                        Start: 10-   Office outpatient vi  
sit   
25 minutes                              Jake LINDA Brunnerer  
Work Phone:   
[phone]                          [facility] [address]  
Work Phone:   
[phone]  
   
                                Start: 2021 Chart Update    Jakerobel Brunnerer  
Work Phone:   
[phone]                          [facility]   
[facility]  
Work Phone:   
[phone]  
   
                                        Start: 2021   Office outpatient vi  
sit   
25 minutes                              Jake O Rosamaria  
Work Phone:   
[phone]                          [facility]   
[facility]  
Work Phone:   
[phone]  
   
                                Start: 2021 Chart Update    Jake MCKEON Rosamaria  
Work Phone:   
[phone]                          [facility]  
Work Phone:   
[phone]  
   
                                                            Patient encounter   
procedure                               Jake MCKEON Rosamaria  
Work Phone:   
[phone]                          [facility]  
Work Phone:   
[phone]  
   
                                                Patient encounter status Jake Jones  
Work Phone:   
[phone]                          [facility]  
Work Phone:   
[phone]  
  
  
  
Procedures  
  
  
                          Date         Procedure    Procedure Detail Performing   
Clinician  
   
                          Start: 2024 AMB REFERRAL TO DERMATOLOGY           
     JAKE JONES  
   
                          Start: 2024 TRIIODOTHYRONINE, TOTAL               
 JAKE JONES  
   
                          Start: 2024 THYROXINE, FREE              JAKE HELLER  
   
                          Start: 2024 TRIIODOTHYRONINE, TOTAL               
 JAKE JONES  
   
                                        Start: 2024   TSH WITH REFLEX TO F  
REE T4   
IF ABNORMAL                                         JAKE JONES  
   
                                        Start: 2024   Thyrotropin [Units/v  
olume]   
in Serum or Plasma                                  Jake Jones MD  
Work Phone:   
[phone]  
   
                          Start: 01- DEXA BONE DENSITY              JAKE JONES  
   
                          Start: 01- TRIIODOTHYRONINE, TOTAL               
 JAKE JONES  
   
                                        Start: 01-   FOLLOW UP IN FAMILY   
MEDICINE                                            JAKE JONES  
   
                                        Start: 2024   CBC panel - Blood by  
   
Automated count                                     JAKE JONES  
   
                                        Start: 2024   Comprehensive metabo  
lic   
2000 panel - Serum or   
Plasma                                              JAKE JONES  
   
                                        Start: 2024   Hemoglobin   
A1c/Hemoglobin.total in   
Blood                                               JAKE JONES  
   
                          Start: 2024 Lipid panel               JAKE MEDINA  
   
                          Start: 2024 PSA, TOTAL AND FREE              TELMA JONES  
   
                          Start: 2024 THYROXINE, FREE              JAKE HELLER  
   
                                        Start: 2024   TSH WITH REFLEX TO F  
REE T4   
IF ABNORMAL                                         JAKE JONES  
   
                                        Start: 2024   Lipid 1996 panel - S  
ada or   
Plasma                                              Jake Jones MD  
Work Phone:   
[phone]  
   
                                        Start: 2024   Thyrotropin [Units/v  
olume]   
in Serum or Plasma                                  Jake Jones MD  
Work Phone:   
[phone]  
   
                          Start: 2023 ECG 12-LEAD               JAKE DANIEL MEDINA  
   
                                        Start: 2023   Ecg routine ecg w/le  
ast 12   
lds w/i&r                                           Kaley Vicente   
APRN-CNP  
Work Phone:   
[phone]  
   
                                        Start: 2022   Lipid 1996 panel - S  
ada or   
Plasma                                              Kaley Vicente APRNAsurintCNP  
Work Phone:   
[phone]  
   
                                        Start: 2020   Excision of basal ce  
ll   
carcinoma                                           Jake Jones  
Work Phone:   
[phone]  
   
                                                    Start: 2017  
End: 2017     Follow Up Appt 6 months                     Regulo Raphael MD  
Work Phone:   
[phone]  
   
                                                    Start: 2017  
End: 2017     PF                                     Rgeulo Raphael MD  
Work Phone:   
[phone]  
   
                                                    Start: 2017  
End: 2017     Follow Up Appt 6 months                     Regulo Raphael MD  
Work Phone:   
[phone]  
   
                                                    Start: 2017  
End: 2017     PFM                                     Regulo Raphael MD  
Work Phone:   
[phone]  
   
                                                    Start: 2017  
End: 2017                         Nurse, Teaching, Wound   
Check (no charge)                                   Regulo Raphael MD  
Work Phone:   
[phone]  
   
                                                    Start: 2017  
End: 2017                         Nurse, Teaching, Wound   
Check (no charge)                                   Regulo Raphael MD  
Work Phone:   
[phone]  
   
                                                    Start: 2017  
End: 2017     *BMP                                    Regulo Raphael MD  
Work Phone:   
[phone]  
   
                                                    Start: 2017  
End: 2017                         CBC W Auto Differential   
panel - Blood                                       Regulo Raphael MD  
Work Phone:   
[phone]  
   
                                                    Start: 2017  
End: 2017     Chest x-ray                             Regulo Raphael MD  
Work Phone:   
[phone]  
   
                                                    Start: 2017  
End: 2017                         Ecg routine ecg w/least 12   
lds w/i&r                                           Regulo Raphael MD  
Work Phone:   
[phone]  
   
                                                    Start: 2017  
End: 2017     *BMP                                    Regulo Raphael MD  
Work Phone:   
[phone]  
   
                                                    Start: 2017  
End: 2017                         CBC W Auto Differential   
panel - Blood                                       Regulo Raphael MD  
Work Phone:   
[phone]  
   
                                                    Start: 2017  
End: 2017     Chest x-ray                             Regulo Raphael MD  
Work Phone:   
[phone]  
   
                                                    Start: 2017  
End: 2017     Electrocardiogram, complete                     Regulo rogers MD  
Work Phone:   
[phone]  
   
                                                    Start: 2017  
End: 2017                         Ecg routine ecg w/least 12   
lds w/i&r                                           Regulo Raphael MD  
Work Phone:   
[phone]  
   
                                                    Start: 2017  
End: 2017     Echocardiography                        Regulo Raphael MD  
Work Phone:   
[phone]  
   
                                                    Start: 2017  
End: 2017     Follow Up Appt 3 months                     Regulo Raphael MD  
Work Phone:   
[phone]  
   
                                                    Start: 2017  
End: 2017     MMM                                     Regulo Raphael MD  
Work Phone:   
[phone]  
   
                                                    Start: 2017  
End: 2017                         Nuclear stress test   
-Lexiscan                                           Regulo Raphael MD  
Work Phone:   
[phone]  
   
                                                    Start: 2017  
End: 2017     Echocardiography                        Regulo Raphael MD  
Work Phone:   
[phone]  
   
                                                    Start: 2017  
End: 2017     Electrocardiogram, complete                     Regulo rogers MD  
Work Phone:   
[phone]  
   
                                                    Start: 2017  
End: 2017     Follow Up Appt 3 months                     Regulo Raphael MD  
Work Phone:   
[phone]  
   
                                                    Start: 2017  
End: 2017     MMM                                     Regulo Raphael MD  
Work Phone:   
[phone]  
   
                                                    Start: 2017  
End: 2017                         Nuclear stress test   
-Lexiscan                                           Regulo Raphael MD  
Work Phone:   
[phone]  
   
                                        Start: 2017   Percutaneous translu  
lina   
coronary angioplasty                    Status post PTCA   
and/or stent                            Aneudyomari Ira  
   
                                        Start: 2017   Percutaneous translu  
lina   
coronary angioplasty                    Status post PTCA   
and/or stent                            Ana Westbrook RN  
   
                          Start: 2017 Colonoscopy               Jake hager  
Work Phone:   
[phone]  
   
                                       Atrial cardioversion              Jake Jones  
Work Phone:   
[phone]  
   
                                                            Balloon kyphoplasty   
of   
fracture of spine                                   Jake Jones  
Work Phone:   
[phone]  
   
                                       Cardiac catheterization              Yony Jones  
Work Phone:   
[phone]  
   
                                                            Coronary artery bypa  
ss   
graft                                               Jake Jones  
Work Phone:   
[phone]  
   
                                        Comment on above:   x4;   
   
                                       Lobectomy of lung              Jake hager  
Work Phone:   
[phone]  
   
                                       Shave biopsy              Jake Jones  
Work Phone:   
[phone]  
   
                                        Comment on above:   right and left shoul  
nallely, earlobe, skin;   
  
  
  
Plan of Treatment  
  
  
                          Date         Care Activity Detail       Author  
   
                                                    Start:   
06-                              DTaP/Tdap/Td Vaccines (3   
- Td or Tdap)                           DTaP/Tdap/Td Vaccines   
(3 - Td or Tdap)                        [facility]  
   
                                                    Start:   
06-          Tetanus vaccination Tetanus: Every 10yrs [facility]  
   
                                                    Start:   
2029          Lipid panel         Lipid Panel         [facility]  
   
                                                    Start:   
2027          Lipid panel         Lipid Panel         [facility]  
   
                                                    Start:   
01-                              Screening for   
osteoporosis              Bone Density Scan         [facility]  
   
                                                    Start:   
2026          Fall risk assessment Falls Risk Assessment [facility]  
   
                                                    Start:   
2026                              History and physical   
examination, annual for   
health maintenance        Wellness Visit            [facility]  
   
                                                    Start:   
2026          Medicare Wellness Visit Medicare Wellness Visit [facility]  
   
                                                    Start:   
2026                              Depression screening   
using PHQ-9 (Patient   
Health Questionnaire 9)   
score                                   Depression   
Screening/Follow-Up   
(PHQ-2/9)                               [facility]  
   
                                                    Start:   
2026          Fall risk assessment Falls Risk Assessment [facility]  
   
                                                    Start:   
2025          Creatinine measurement Creatinine Level    [facility]  
   
                                                    Start:   
2025          Potassium measurement Potassium Level     [facility]  
   
                                                    Start:   
2025                              Thyroid stimulating   
hormone measurement       TSH Level                 [facility]  
   
                                                    Start:   
2025  
End: 2025                         Patient encounter   
procedure                               2025 10:00 AM EDT   
Office Visit [facility]   
Endocrinology   
Phys[facility] [address] [facility] [address] [facility]   
[place]   
[id number] [phone]   
Manuel Avelar MD [address] [place] [id number] [phone]   
(Work) [phone]   
(Fax)                                   [facility]   
Endocrinology   
Physicians  
   
                                                    Start:   
2025  
End: 2025                         Patient encounter   
procedure                               2025 9:00 AM EST   
Office Visit [facility]   
Primary Care Physicians   
[address] [place] [address]   
[phone]   
Eber Bazan,  [address]   
[place] [address] [phone]   
(Work) [phone]   
(Fax)                                   [facility] Primary   
Care Physicians  
   
                                                    Start:   
2025  
End: 2025                         Patient encounter   
procedure                               2025 1:15 PM EST   
Office Visit [facility]   
Ear, Nose and Throat   
Physicians [address] [facility]   
[place]   
[id number] [phone]   
Sabino Mahoney MD [address] [place] [address]   
[phone] (Work)   
[phone] (Fax)                      [facility] Ear, Nose   
and Throat Physicians  
   
                                                    Start:   
02-  
End: 01-                         Comprehensive metabolic   
2000 panel - Serum or   
Plasma                                  Comprehensive Metabolic   
Panel Lab Routine   
Coronary artery disease   
involving native   
coronary artery of   
native heart without   
angina pectoris   
Expected: 02/15/2025,   
Expires: 01/15/2026                     [facility]  
   
                                                    Comment on   
above:                                  Expected: 02/15/2025, Expires: 01/15/202  
6   
   
                                                    Start:   
02-  
End: 01-                         Lipid 1996 panel - Serum   
or Plasma                               Lipid Panel Lab Routine   
Coronary artery disease   
involving native   
coronary artery of   
native heart without   
angina pectoris   
Expected: 02/15/2025,   
Expires: 01/15/2026                     [facility]  
   
                                                    Comment on   
above:                                  Expected: 02/15/2025, Expires: 01/15/202  
6   
   
                                                    Start:   
02-  
End: 01-                         Thyrotropin   
[Units/volume] in Serum   
or Plasma                               TSH Lab Routine   
Acquired hypothyroidism   
Expected: 02/15/2025,   
Expires: 01/15/2026                     [facility]  
Work Phone:   
[phone]  
   
                                                    Comment on   
above:                                  Expected: 02/15/2025, Expires: 01/15/202  
6   
   
                                                    Start:   
02-  
End: 01-                         Thyroxine (T4) free   
[Mass/volume] in Serum or   
Plasma                                  T4, Free Lab Routine   
Acquired hypothyroidism   
Expected: 02/15/2025,   
Expires: 01/15/2026                     [facility]  
   
                                                    Comment on   
above:                                  Expected: 02/15/2025, Expires: 01/15/202  
6   
   
                                                    Start:   
2025                              Subsequent hospital visit   
by physician                            2025 2:00 PM EST   
Hospital Encounter   
[facility]   
Emergency Department CT   
Scan [address] [place]   
[address] [phone]   
Sabino Mahoney  [address]   
[place] [address]   
[phone] (Work)   
[phone] (Fax)                      [facility]   
Emergency Department   
CT Scan  
   
                                                    Start:   
2025  
End: 2025                         Patient encounter   
procedure                               2025 10:00 AM EST   
Appointment [facility] Emergency   
Department CT Scan [address]   
[place] [address]   
[phone] Sabino Mahoney    
[address] [place] [address]   
[phone] (Work)   
[phone] (Fax)                      [facility]   
Emergency Department   
CT Scan  
   
                                                    Start:   
2025  
End: 2025           ambulatory                2025 8:45 AM EST   
Evaluation [facility] Speech Therapy   
[address]   
[place]   
[address] [phone]   
Sabino Mahoney  [address]   
[place] [address]   
[phone] (Work)   
[phone] (Fax)   
Dimple Cartagena SLP   
Discharge Disposition:   
Home                                    [facility]   
Speech Therapy  
   
                                                    Start:   
2025  
End: 2025                         Patient encounter   
procedure                               2025 10:00 AM EST   
Office Visit [facility]   
Primary Care Physicians   
[address] [place] [id number] [phone]   
Eber Bazan,  [address]   
[place] [address] [phone]   
(Work) [phone]   
(Fax)                                   [facility] Primary   
Care Physicians  
   
                                                    Start:   
2025                              Medicare Annual Wellness   
Visit                                   Medicare Annual   
Wellness Visit (AWV)                    [facility]  
   
                                                    Start:   
01-  
End: 07-                         CBC panel - Blood by   
Automated count                         CBC Lab Routine   
Paroxysmal atrial   
fibrillation (Multi)   
Expected: 01/10/2025   
(Approximate), Expires:   
07/10/2025                              [facility]  
Work Phone:   
[phone]  
   
                                                    Comment on   
above:                                  Expected: 01/10/2025 (Approximate), Expi  
res: 07/10/2025   
   
                                                    Start:   
01-  
End: 07-                         Comprehensive metabolic   
2000 panel - Serum or   
Plasma                                  Comprehensive Metabolic   
Panel Lab Routine   
Paroxysmal atrial   
fibrillation (Multi)   
Expected: 01/10/2025   
(Approximate), Expires:   
07/10/2025                              [facility]  
Work Phone:   
[phone]  
   
                                                    Comment on   
above:                                  Expected: 01/10/2025 (Approximate), Expi  
res: 07/10/2025   
   
                                                    Start:   
01-  
End: 07-                         Hemoglobin   
A1c/Hemoglobin.total in   
Blood                                   Hemoglobin A1C Lab   
Routine Prediabetes   
Expected: 01/10/2025   
(Approximate), Expires:   
07/10/2025                              [facility]  
Work Phone:   
[phone]  
   
                                                    Comment on   
above:                                  Expected: 01/10/2025 (Approximate), Expi  
res: 07/10/2025   
   
                                                    Start:   
01-  
End: 07-                         Lipid 1996 panel - Serum   
or Plasma                               Lipid Panel Lab Routine   
Familial hyperlipidemia   
Expected: 01/10/2025   
(Approximate), Expires:   
07/10/2025                              [facility]  
Work Phone:   
[phone]  
   
                                                    Comment on   
above:                                  Expected: 01/10/2025 (Approximate), Expi  
res: 07/10/2025   
   
                                                    Start:   
01-          Medicare Wellness Visit Medicare Wellness Visit [facility]  
   
                                                    Start:   
01-  
End: 07-                         Triiodothyronine (T3)   
[Mass/volume] in Serum or   
Plasma                                  T3 Lab Routine Acquired   
hypothyroidism   
Expected: 01/10/2025   
(Approximate), Expires:   
07/10/2025                              [facility]  
Work Phone:   
[phone]  
   
                                                    Comment on   
above:                                  Expected: 01/10/2025 (Approximate), Expi  
res: 07/10/2025   
   
                                                    Start:   
01-  
End: 07-                         TSH with reflex to Free   
T4 if abnormal                          TSH with reflex to Free   
T4 if abnormal Lab   
Routine Acquired   
hypothyroidism   
Expected: 01/10/2025   
(Approximate), Expires:   
07/10/2025                              [facility]  
Work Phone:   
[phone]  
   
                                                    Comment on   
above:                                  Expected: 01/10/2025 (Approximate), Expi  
res: 07/10/2025   
   
                                                    Start:   
2025          Creatinine measurement Creatinine Level    [facility]  
   
                                                    Start:   
2025                              Diabetes mellitus   
screening                 Diabetes Screening        [facility]  
   
                                                    Start:   
2025                              Hemoglobin A1c   
measurement                             Diabetes: Hemoglobin   
A1C                                     [facility]  
   
                                                    Start:   
2025          Potassium measurement Potassium Level     [facility]  
   
                                                    Start:   
2025                              Thyroid stimulating   
hormone measurement       TSH Level                 [facility]  
   
                                                    Start:   
2024  
End: 2024           ambulatory                2024 12:00 PM EST   
Infusion/Injection   
[facility]   
Ambulatory Care [address] [place] [address]   
[phone] Manuel Avelar MD [facility] [address]   
[place] [address]   
[phone] (Work)   
[phone] (Fax)   
Discharge Disposition:   
Home                                    [facility]   
Ambulatory Care  
   
                                                    Start:   
2024  
End: 2024     ambulatory                              [facility]   
Ambulatory Care  
   
                                                    Start:   
2024                              COVID-19 Vaccine ( season)                         COVID-19 Vaccine (4 -   
2024-25 season)                         [facility]  
   
                                                    Start:   
2024          Influenza vaccination Influenza Vaccine (#1) [facility]  
   
                                                    Start:   
07-  
End: 01-                         CBC panel - Blood by   
Automated count                         CBC Lab Routine   
Paroxysmal atrial   
fibrillation (CMS/HCC)   
Expected: 07/10/2024   
(Approximate), Expires:   
01/10/2025                              [facility]  
Work Phone:   
[phone]  
   
                                                    Comment on   
above:                                  Expected: 07/10/2024 (Approximate), Expi  
res: 01/10/2025   
   
                                                    Start:   
07-  
End: 01-                         Comprehensive metabolic   
2000 panel - Serum or   
Plasma                                  Comprehensive Metabolic   
Panel Lab Routine   
Paroxysmal atrial   
fibrillation (CMS/HCC)   
Expected: 07/10/2024   
(Approximate), Expires:   
01/10/2025                              [facility]  
Work Phone:   
[phone]  
   
                                                    Comment on   
above:                                  Expected: 07/10/2024 (Approximate), Expi  
res: 01/10/2025   
   
                                                    Start:   
07-  
End: 01-                         Prostate specific Ag   
[Mass/volume] in Serum or   
Plasma                                  Prostate Specific   
Antigen Lab Routine   
Elevated PSA Expected:   
07/10/2024   
(Approximate), Expires:   
01/10/2025                              [facility]  
Work Phone:   
[phone]  
   
                                                    Comment on   
above:                                  Expected: 07/10/2024 (Approximate), Expi  
res: 01/10/2025   
   
                                                    Start:   
07-  
End: 07-                         Patient encounter   
procedure                               07/10/2024 9:00 AM EDT   
Office Visit [facility]  [address]   
[address] [facility] [address]   
[place] [id number] [phone] Jake Jones MD [address] [facility] [address]   
[place] [address]   
[phone] (Work)   
[phone] (Fax)                      [facility]  
   
                                                    Start:   
2024          Creatinine measurement Creatinine Level    [facility]  
   
                                                    Start:   
2024          Potassium measurement Potassium Level     [facility]  
   
                                                    Start:   
2024  
End: 2024           ambulatory                2024 3:40 PM EDT   
[facility] [address]   
[place] [address] [phone] Other   
fatigue; Pharyngitis,   
unspecified etiology                    [facility]  
   
                                                    Comment on   
above:                                  Other fatigue;  
Pharyngitis, unspecified etiology   
   
                                                    Start:   
2024  
End: 2025                         CBC W Auto Differential   
panel - Blood                                       [facility]  
Work Phone:   
[phone]  
   
                                                    Comment on   
above:                                  Expected: 2024 (Approximate), Expi  
res: 2025   
   
                                                    Start:   
2024  
End: 2025                         Heterophile Ab [Presence]   
in Serum, Plasma or Blood   
by Rapid immunoassay                                [facility]  
Work Phone:   
[phone]  
   
                                                    Comment on   
above:                                  Expected: 2024 (Approximate), Expi  
res: 2025   
   
                                                    Start:   
01-  
End: 01-                         DXA Skeletal system Views   
for bone density                        XR DEXA bone density   
Imaging Routine   
Osteoporosis without   
current pathological   
fracture, unspecified   
osteoporosis type   
Expected: 01/10/2024,   
Expires: 01/10/2025                     [facility]  
Work Phone:   
[phone]  
   
                                                    Comment on   
above:                                  Expected: 01/10/2024, Expires: 01/10/202  
5   
   
                                                    Start:   
01-  
End: 01-                         Triiodothyronine (T3)   
[Mass/volume] in Serum or   
Plasma                                  T3 Lab Routine   
Paroxysmal atrial   
fibrillation (CMS/HCC)   
Expected: 01/10/2024   
(Approximate), Expires:   
01/10/2025                              [facility]  
Work Phone:   
[phone]  
   
                                                    Comment on   
above:                                  Expected: 01/10/2024 (Approximate), Expi  
res: 01/10/2025   
   
                                                    Start:   
2024  
End: 2024                         CBC panel - Blood by   
Automated count                         CBC Lab Routine Acute   
diastolic congestive   
heart failure (CMS/HCC)   
Expected: 2024   
(Approximate), Expires:   
2024                              [facility]  
Work Phone:   
[phone]  
   
                                                    Comment on   
above:                                  Expected: 2024 (Approximate), Expi  
res: 2024   
   
                                                    Start:   
2024  
End: 2024                         Comprehensive metabolic   
2000 panel - Serum or   
Plasma                                  Comprehensive Metabolic   
Panel Lab Routine Acute   
diastolic congestive   
heart failure (CMS/HCC)   
Expected: 2024   
(Approximate), Expires:   
2024                              [facility]  
Work Phone:   
[phone]  
   
                                                    Comment on   
above:                                  Expected: 2024 (Approximate), Expi  
res: 2024   
   
                                                    Start:   
2024  
End: 2024                         Hemoglobin   
A1c/Hemoglobin.total in   
Blood                                   Hemoglobin A1C Lab   
Routine Prediabetes   
Expected: 2024   
(Approximate), Expires:   
2024                              [facility]  
Work Phone:   
[phone]  
   
                                                    Comment on   
above:                                  Expected: 2024 (Approximate), Expi  
res: 2024   
   
                                                    Start:   
2024  
End: 2024                         Lipid 1996 panel - Serum   
or Plasma                               Lipid Panel Lab Routine   
Familial hyperlipidemia   
Expected: 2024   
(Approximate), Expires:   
2024                              [facility]  
Work Phone:   
[phone]  
   
                                                    Comment on   
above:                                  Expected: 2024 (Approximate), Expi  
res: 2024   
   
                                                    Start:   
2024                              Medicare Annual Wellness   
Visit                                   Medicare Annual   
Wellness Visit (AWV)                    [facility]  
   
                                                    Start:   
2024  
End: 2024           PSA, total and free       PSA, total and free Lab   
Routine Elevated PSA   
Expected: 2024   
(Approximate), Expires:   
2024                              [facility]  
Work Phone:   
[phone]  
   
                                                    Comment on   
above:                                  Expected: 2024 (Approximate), Expi  
res: 2024   
   
                                                    Start:   
2023                              Diabetes mellitus   
screening                 Diabetes Screening        [facility]  
   
                                                    Start:   
2023                              Hemoglobin A1c   
measurement                             Diabetes: Hemoglobin   
A1C                                     [facility]  
   
                                                    Start:   
2023                              COVID-19 Vaccine (4 -   
2023-24 season)                         COVID-19 Vaccine (4 -   
2023-24 season)                         [facility]  
   
                                                    Start:   
2023          Influenza vaccination Influenza Vaccine (#1) [facility]  
   
                                                    Start:   
2023                              EPV, Provider:   
Jake Jones, Status:   
Pen, Time: 9:30 AM                      EPV, Provider:   
Jake Jones, Status:   
Pen, Time: 9:30 AM                      [facility]  
Work Phone:   
[phone]  
   
                                                    Start:   
2023  
End: 2023                         Patient encounter   
procedure                               2023 9:30 AM EDT   
Office Visit [facility] [address]   
[address] [facility] [address]   
[place] [id number] [phone] Jake Jones MD [address]   
[address] [facility] [address]   
[place] [address]   
[phone] (Work)   
[phone] (Fax)                      [facility]  
   
                                                    Start:   
2023          Echocardiography    Echocardiogram      [facility]  
   
                                                    Start:   
2022                              FUV, Provider:   
Jake Jones, Status:   
Pen, Time: 9:30 AM                      FUV, Provider:   
Jake Jones, Status:   
Pen, Time: 9:30 AM                      [facility]  
Work Phone:   
[phone]  
   
                                                    Start:   
10-                              EPV, Provider:   
Jake Jones, Status:   
Pen, Time: 11:00 AM                     EPV, Provider:   
Jake Jones, Status:   
Pen, Time: 11:00 AM                     [facility]  
Work Phone:   
[phone]  
   
                                                    Start:   
10-                              FUV, Provider:   
Jake Jones, Status:   
Pen, Time: 9:00 AM                      FUV, Provider:   
Jake Jones, Status:   
Pen, Time: 9:00 AM                      [facility]  
Work Phone:   
[phone]  
   
                                                    Start:   
10-                              MSO, Provider:   
Jake Jones, Status:   
Pen, Time: 9:00 AM                      MSO, Provider:   
Jake Jones, Status:   
Pen, Time: 9:00 AM                      [facility]  
Work Phone:   
[phone]  
   
                                                    Start:   
10-                              FUV, Provider:   
Madan Morelos, Status: Pen,   
Time: 10:00 AM                          FUV, Provider:   
Madan Morelos, Status:   
Pen, Time: 10:00 AM                     [facility]   
[facility]  
[address]  
Work Phone:   
[phone]  
   
                                                    Start:   
2022                              FUV, Provider:   
Jonathan Landers, Status:   
Pen, Time: 8:45 AM                      FUV, Provider:   
Jonathan Landers, Status:   
Pen, Time: 8:45 AM                      [facility]  
Work Phone:   
[phone]  
   
                                                    Start:   
2022                              Screening for   
osteoporosis              Bone Density Scan         [facility]  
   
                                                    Start:   
2022                              EPV, Provider:   
Jake Jones, Status:   
Pen, Time: 9:00 AM                      EPV, Provider:   
Jake Jones, Status:   
Pen, Time: 9:00 AM                      [facility]  
Work Phone:   
[phone]  
   
                                                    Start:   
2022                              EPV, Provider:   
Jake Jones, Status:   
Pen, Time: 9:30 AM                      EPV, Provider:   
Jake Jones, Status:   
Pen, Time: 9:30 AM                      [facility]  
Work Phone:   
[phone]  
   
                                                    Start:   
2022                              POV, Provider:   
Amaury Gaffney, Status:   
Pen, Time: 9:30 AM                      POV, Provider:   
Amaury Gaffney, Status:   
Pen, Time: 9:30 AM                      [facility]   
Orthopedics and   
Sports Medicine [address]  
Work Phone:   
[phone]  
   
                                                    Start:   
2022                              [facility]   
Amaury Gaffney, Status:   
Pen, Time: 8:30 AM                      [facility]   
Amaury Gaffney, Status:   
Pen, Time: 8:30 AM                      [facility] [address]  
Work Phone:   
[phone]  
   
                                                    Start:   
2022                              NURSEVST, Provider:   
PRIMARY [place]   
[id number] RN,[id number],   
Status: Pen, Time: 10:00   
AM                                      NURSEVST, Provider:   
PRIMARY [place]   
[id number]   
RN,[id number], Status:   
Pen, Time: 10:00 AM                     [facility]  
Work Phone:   
[phone]  
   
                                                    Start:   
2022                              EPV, Provider:   
Jake Jones, Status:   
Pen, Time: 11:30 AM                     EPV, Provider:   
Jake Jones, Status:   
Pen, Time: 11:30 AM                     [facility] [address]  
Work Phone:   
[phone]  
   
                                                    Start:   
2022                              FUV, Provider:   
Madan Morelos, Status: Pen,   
Time: 9:00 AM                           FUV, Provider:   
Madan Morelos, Status:   
Pen, Time: 9:00 AM                      [facility]   
Gastro[facility]  
[address]  
Work Phone:   
[phone]  
   
                                                    Start:   
2021                              COVID-19 Vaccine (4 -   
Booster for Pfizer   
series)                                 COVID-19 Vaccine (4 -   
Booster for Pfizer   
series)                                 [facility]  
   
                                                    Start:   
2021                              COVID-19 Vaccine (4 -   
Pfizer series)                          COVID-19 Vaccine (4 -   
Pfizer series)                          [facility]  
   
                                                    Start:   
2021                              EPV, Provider:   
Jake Jones, Status:   
Pen, Time: 10:30 AM                     EPV, Provider:   
Jake Jones, Status:   
Pen, Time: 10:30 AM                     [facility]  
Work Phone:   
[phone]  
   
                                                    Start:   
2021                              EGDBRAVO, Provider:   
Jomar Casillas, Status: Pen,   
Time: 11:30 AM                          EGDBRAVO, Provider:   
Jomar Casillas, Status: Pen,   
Time: 11:30 AM                          [facility]  
Work Phone:   
[phone]  
   
                                                    Start:   
2021                              EGD, Provider:   
Madan Morelos, Status: Pen,   
Time: 11:00 AM                          EGD, Provider:   
Madan Morelos, Status:   
Pen, Time: 11:00 AM                     [facility]   
G[facility]  
[address]  
Work Phone:   
[phone]  
   
                                                    Start:   
2021                              FUV, Provider:   
Madan Morelos, Status: Pen,   
Time: 9:15 AM                           FUV, Provider:   
Madan Morelos, Status:   
Pen, Time: 9:15 AM                      [facility]   
[facility]  
Work Phone:   
[phone]  
   
                                                    Start:   
2020                              Respiratory Syncytial   
Virus Immunization:   
Pregnancy Risk, 60-74   
Risk, or 75+ (1 - 1-dose   
75+ series)                             Respiratory Syncytial   
Virus Immunization:   
Pregnancy Risk, 60-74   
Risk, or 75+ (1 -   
1-dose 75+ series)                      [facility]  
   
                                                    Start:   
03-  
End: 03-     Appointment         Appointment         [facility]  
Work Phone:   
[phone]  
   
                                                    Start:   
2017  
End: 2017     Follow Up Appt 6 months Follow Up Appt 6 months [facility]  
t   
Group  
Work Phone:   
[phone]  
   
                                                    Start:   
2017  
End: 2017     PFM                 PFM                 [facility]  
Work Phone:   
[phone]  
   
                                                    Start:   
2017  
End: 2017     Appointment         Appointment         [facility]  
Work Phone:   
[phone]  
   
                                                    Start:   
2017  
End: 2017     Follow Up Appt 6 months Follow Up Appt 6 months [facility]  
t   
Group  
Work Phone:   
[phone]  
   
                                                    Start:   
2017  
End: 2017     PFM                 PFM                 [facility]  
Work Phone:   
[phone]  
   
                                                    Start:   
2017  
End: 2017     Appointment         Appointment         [facility]  
Work Phone:   
[phone]  
   
                                                    Start:   
2017  
End: 2017     *BMP                *BMP                [facility]  
Work Phone:   
[phone]  
   
                                                    Start:   
2017  
End: 2017                         CBC W Auto Differential   
panel - Blood             *CBC without Diff         [facility]  
Work Phone:   
[phone]  
   
                                                    Start:   
2017  
End: 2017           Chest x-ray               X-Ray, Chest, PA &   
Lateral                                 [facility]  
Work Phone:   
[phone]  
   
                                                    Start:   
2017  
End: 2017                         Ecg routine ecg w/least   
12 lds w/i&r              EKG (In office)           [facility]  
Work Phone:   
[phone]  
   
                                                    Start:   
2017  
End: 2017           Left Heart Cath W/Grafts  Left Heart Cath   
W/Grafts                                [facility]  
Work Phone:   
[phone]  
   
                                                    Start:   
2017  
End: 2017     *BMP                *BMP                [facility]  
Work Phone:   
[phone]  
   
                                                    Start:   
2017  
End: 2017                         CBC W Auto Differential   
panel - Blood             *CBC without Diff         [facility]  
Work Phone:   
[phone]  
   
                                                    Start:   
2017  
End: 2017           Chest x-ray               X-Ray, Chest, PA &   
Lateral                                 [facility]  
Work Phone:   
[phone]  
   
                                                    Start:   
2017  
End: 2017                         Electrocardiogram,   
complete                  EKG (In office)           [facility]  
Work Phone:   
[phone]  
   
                                                    Start:   
2017  
End: 2017           Left Heart Cath W/Grafts  Left Heart Cath   
W/Grafts                                [facility]  
Work Phone:   
[phone]  
   
                                                    Start:   
2017  
End: 2017     Appointment         Appointment         [facility]  
Work Phone:   
[phone]  
   
                                                    Start:   
2017  
End: 2017                         Ecg routine ecg w/least   
12 lds w/i&r              EKG (In office)           [facility]  
Work Phone:   
[phone]  
   
                                                    Start:   
2017  
End: 2017           Echocardiography          Echocardiogram   
(complete)                              [facility]  
Work Phone:   
[phone]  
   
                                                    Start:   
2017  
End: 2017     Follow Up Appt 3 months Follow Up Appt 3 months [facility]  
t   
Group  
Work Phone:   
[phone]  
   
                                                    Start:   
2017  
End: 2017     MMM                 MMM                 [facility]  
Work Phone:   
[phone]  
   
                                                    Start:   
2017  
End: 2017                         Nuclear stress test   
-Lexiscan                               Nuclear stress test   
-Lexiscan                               [facility]  
Work Phone:   
[phone]  
   
                                                    Start:   
2017  
End: 2017                         Us abdominal real time   
w/image limited                         US Abdominal (aneurysm   
screening)                              [facility]  
Work Phone:   
[phone]  
   
                                                    Start:   
2017  
End: 2017           Echo exam of abdomen      US Abdominal (aneurysm   
screening)                              [facility]  
Work Phone:   
[phone]  
   
                                                    Start:   
2017  
End: 2017           Echocardiography          Echocardiogram   
(complete)                              [facility]  
Work Phone:   
[phone]  
   
                                                    Start:   
2017  
End: 2017                         Electrocardiogram,   
complete                  EKG (In office)           [facility]  
Work Phone:   
[phone]  
   
                                                    Start:   
2017  
End: 2017     Follow Up Appt 3 months Follow Up Appt 3 months [facility]  
t   
Group  
Work Phone:   
[phone]  
   
                                                    Start:   
2017  
End: 2017     MMM                 MMM                 [facility]  
Work Phone:   
[phone]  
   
                                                    Start:   
2017  
End: 2017                         Nuclear stress test   
-Lexiscan                               Nuclear stress test   
-Lexiscan                               [facility]  
Work Phone:   
[phone]  
   
                                                    Start:   
2010          Fall risk assessment Falls Risk Assessment [facility]  
   
                                                    Start:   
1995                              Administration of herpes   
zoster vaccine                          Zoster Vaccines (1 of   
2)                                      [facility]  
   
                                                    Start:   
1995                Zoster Vaccines (1 of 2)  Zoster Vaccines (1 of   
2)                                      [facility]  
   
                                                    Start:   
1963          Hepatitis C screening Hepatitis C Screening [facility]  
   
                                                    Start:   
1957                              Depression screening   
using PHQ-9 (Patient   
Health Questionnaire 9)   
score                                   Depression   
Screening/Follow-Up   
(PHQ-2/9)                               [facility]  
   
                                                    Start:   
1946                Examination of skin       Derm Melanoma Skin   
Check                                   [facility]  
   
                                                    Start:   
1945                              Medicare Annual Wellness   
Visit                                   Medicare Annual   
Wellness Visit (AWV)                    [facility]  
   
                                                      
End: 10-           Beta Crosslaps (Beta CTX) Beta Crosslaps (Beta   
CTX) Lab Routine   
Osteoporosis,   
unspecified   
osteoporosis type,   
unspecified   
pathological fracture   
presence 1 Occurrences   
starting 10/30/2024   
until 10/30/2025                        [facility]  
   
                                                    Comment on   
above:                                  1 Occurrences starting 10/30/2024 until   
10/30/2025   
   
                                                      
End: 10-                         Calcium [Mass/volume] in   
24 hour Urine                           Calcium, Urine, 24 Hour   
Lab Routine   
Osteoporosis,   
unspecified   
osteoporosis type,   
unspecified   
pathological fracture   
presence 1 Occurrences   
starting 10/30/2024   
until 10/30/2025                        [facility]  
   
                                                    Comment on   
above:                                  1 Occurrences starting 10/30/2024 until   
10/30/2025   
   
                                                      
End: 10-                         Calcium.ionized   
[Mass/volume] in Serum or   
Plasma                                  Calcium, ionized Lab   
Routine Osteoporosis,   
unspecified   
osteoporosis type,   
unspecified   
pathological fracture   
presence 1 Occurrences   
starting 10/30/2024   
until 10/30/2025                        [facility]  
   
                                                    Comment on   
above:                                  1 Occurrences starting 10/30/2024 until   
10/30/2025   
   
                                                      
End: 2026                         CBC panel - Blood by   
Automated count                         CBC Lab Routine   
Abnormal finding of   
blood chemistry,   
unspecified Paroxysmal   
atrial fibrillation   
(HCC) 1 Occurrences   
starting 2025   
until 2026                        [facility]  
   
                                                    Comment on   
above:                                  1 Occurrences starting 2025 until   
2026   
   
                                                      
End: 10-           Celiac disease screen     Celiac Serology [place]   
Panel Lab Routine   
Osteoporosis,   
unspecified   
osteoporosis type,   
unspecified   
pathological fracture   
presence 1 Occurrences   
starting 10/30/2024   
until 10/30/2025                        [facility]  
   
                                                    Comment on   
above:                                  1 Occurrences starting 10/30/2024 until   
10/30/2025   
   
                                                      
End: 10-                         Comprehensive metabolic   
2000 panel - Serum or   
Plasma                                  Comprehensive Metabolic   
Panel Lab Routine   
Osteoporosis,   
unspecified   
osteoporosis type,   
unspecified   
pathological fracture   
presence 1 Occurrences   
starting 10/30/2024   
until 10/30/2025                        [facility]  
Work Phone:   
[phone]  
   
                                                    Comment on   
above:                                  1 Occurrences starting 10/30/2024 until   
10/30/2025   
   
                                                      
End: 2026                         Comprehensive metabolic   
2000 panel - Serum or   
Plasma                                  Comprehensive Metabolic   
Panel Lab Routine   
Abnormal finding of   
blood chemistry,   
unspecified Paroxysmal   
atrial fibrillation   
(HCC) 1 Occurrences   
starting 2025   
until 2026                        [facility]  
   
                                                    Comment on   
above:                                  1 Occurrences starting 2025 until   
2026   
   
                                                      
End: 10-                         Creatinine [Mass/time] in   
24 hour Urine                           Creatinine, Urine, 24   
Hour Lab Routine   
Osteoporosis,   
unspecified   
osteoporosis type,   
unspecified   
pathological fracture   
presence 1 Occurrences   
starting 10/30/2024   
until 10/30/2025                        [facility]  
   
                                                    Comment on   
above:                                  1 Occurrences starting 10/30/2024 until   
10/30/2025   
   
                                                      
End: 2026           CT Chest W contrast IV    CT Chest Thorax With   
Contrast Imaging   
Routine Chronic cough 1   
Occurrences starting   
2025 until   
2026                              [facility]  
Work Phone:   
[phone]  
   
                                                    Comment on   
above:                                  1 Occurrences starting 2025 until   
2026   
   
                                                      
End: 2026                         Hemoglobin   
A1c/Hemoglobin.total in   
Blood                                   Hemoglobin A1c Lab   
Routine Abnormal   
finding of blood   
chemistry, unspecified   
1 Occurrences starting   
2025 until   
2026                              [facility]  
Work Phone:   
[phone]  
   
                                                    Comment on   
above:                                  1 Occurrences starting 2025 until   
2026   
   
                                                      
End: 2026                         Hepatitis C Ab with   
Reflex to HCV Virus   
Quantitation                            Hepatitis C Ab with   
Reflex to HCV Virus   
Quantitation Lab   
Routine Medicare annual   
wellness visit,   
subsequent Need for   
hepatitis C screening   
test 1 Occurrences   
starting 2025   
until 2026                        [facility]  
   
                                                    Comment on   
above:                                  1 Occurrences starting 2025 until   
2026   
   
                                                      
End: 10-                         Immunoglobulin light   
chains.free panel - Serum               Immunoglobulin Free   
Light Chains,Blood Lab   
Routine Osteoporosis,   
unspecified   
osteoporosis type,   
unspecified   
pathological fracture   
presence 1 Occurrences   
starting 10/30/2024   
until 10/30/2025                        [facility]  
   
                                                    Comment on   
above:                                  1 Occurrences starting 10/30/2024 until   
10/30/2025   
   
                                                      
End: 2026                         Lipid 1996 panel - Serum   
or Plasma                               Lipid Panel Lab Routine   
Abnormal finding of   
blood chemistry,   
unspecified Paroxysmal   
atrial fibrillation   
(HCC) 1 Occurrences   
starting 2025   
until 2026                        [facility]  
   
                                                    Comment on   
above:                                  1 Occurrences starting 2025 until   
2026   
   
                                                      
End: 10-                         Magnesium [Mass/volume]   
in Serum or Plasma                      Magnesium Level Lab   
Routine Osteoporosis,   
unspecified   
osteoporosis type,   
unspecified   
pathological fracture   
presence 1 Occurrences   
starting 10/30/2024   
until 10/30/2025                        [facility]  
   
                                                    Comment on   
above:                                  1 Occurrences starting 10/30/2024 until   
10/30/2025   
   
                                                      
End: 10-                         Parathyrin.intact and   
Calcium panel - Serum or   
Plasma                                  PTH Intact (Processed   
at [facility]) Lab Routine   
Osteoporosis,   
unspecified   
osteoporosis type,   
unspecified   
pathological fracture   
presence 1 Occurrences   
starting 10/30/2024   
until 10/30/2025                        [facility]  
   
                                                    Comment on   
above:                                  1 Occurrences starting 10/30/2024 until   
10/30/2025   
   
                                                      
End: 10-                         Phosphate [Mass/volume]   
in Serum or Plasma                      Phosphorus Lab Routine   
Osteoporosis,   
unspecified   
osteoporosis type,   
unspecified   
pathological fracture   
presence 1 Occurrences   
starting 10/30/2024   
until 10/30/2025                        [facility]  
   
                                                    Comment on   
above:                                  1 Occurrences starting 10/30/2024 until   
10/30/2025   
   
                                                      
End: 2026                         RF videography   
Hypopharynx and Esophagus   
Views W liquid and paste   
contrast PO during   
swallowing                              XR Modifed Barium   
Swallow Imaging Routine   
Oropharyngeal dysphagia   
1 Occurrences starting   
2025 until   
2026                              [facility]  
Work Phone:   
[phone]  
   
                                                    Comment on   
above:                                  1 Occurrences starting 2025 until   
2026   
   
                                                      
End: 10-           Serum immunofixation      Immunofixation, Serum   
Lab Routine   
Osteoporosis,   
unspecified   
osteoporosis type,   
unspecified   
pathological fracture   
presence 1 Occurrences   
starting 10/30/2024   
until 10/30/2025                        [facility]  
   
                                                    Comment on   
above:                                  1 Occurrences starting 10/30/2024 until   
10/30/2025   
   
                                                      
End: 10-                         Serum protein   
electrophoresis                         Protein   
Electrophoresis, Blood   
Lab Routine   
Osteoporosis,   
unspecified   
osteoporosis type,   
unspecified   
pathological fracture   
presence 1 Occurrences   
starting 10/30/2024   
until 10/30/2025                        [facility]  
   
                                                    Comment on   
above:                                  1 Occurrences starting 10/30/2024 until   
10/30/2025   
   
                                                      
End: 10-                         Sodium [Moles/volume] in   
24 hour Urine                           Sodium, Urine, 24 Hour   
Lab Routine   
Osteoporosis,   
unspecified   
osteoporosis type,   
unspecified   
pathological fracture   
presence 1 Occurrences   
starting 10/30/2024   
until 10/30/2025                        [facility]  
   
                                                    Comment on   
above:                                  1 Occurrences starting 10/30/2024 until   
10/30/2025   
   
                                                      
End: 2026                         Thyrotropin   
[Units/volume] in Serum   
or Plasma                               TSH with Reflex Free T4   
Lab Routine Abnormal   
finding of blood   
chemistry, unspecified   
Acquired hypothyroidism   
1 Occurrences starting   
2025 until   
2026                              [facility]  
   
                                                    Comment on   
above:                                  1 Occurrences starting 2025 until   
2026   
   
                                                      
End: 10-                         Vitamin D, 25-hydroxy   
measurement                             Vitamin D, Total, 25-OH   
Lab Routine   
Osteoporosis,   
unspecified   
osteoporosis type,   
unspecified   
pathological fracture   
presence 1 Occurrences   
starting 10/30/2024   
until 10/30/2025                        [facility]  
   
                                                    Comment on   
above:                                  1 Occurrences starting 10/30/2024 until   
10/30/2025   
   
                                                      
End: 10-                         XR Lumbar spine 2 or 3   
Views                                   XR Lumbar Spine 2-3   
Views (Standard)   
Imaging Routine   
Osteoporosis,   
unspecified   
osteoporosis type,   
unspecified   
pathological fracture   
presence 1 Occurrences   
starting 10/30/2024   
until 10/30/2025                        [facility]  
   
                                                    Comment on   
above:                                  1 Occurrences starting 10/30/2024 until   
10/30/2025   
   
                                                      
End: 10-           XR Thoracic spine 2 Views XR Thoracic Spine 2   
Views Imaging Routine   
Osteoporosis,   
unspecified   
osteoporosis type,   
unspecified   
pathological fracture   
presence 1 Occurrences   
starting 10/30/2024   
until 10/30/2025                        [facility]  
   
                                                    Comment on   
above:                                  1 Occurrences starting 10/30/2024 until   
10/30/2025   
  
  
  
Immunizations  
  
  
                      Immunization Date Immunization Notes      Care Provider [facility]  
   
                                        2024          influenza, high dose  
   
seasonal,   
preservative-free                                   Eber Hortensia DO  
Work Phone:   
[phone]                          [facility]  
   
                                        2024          RSV, 60 Years And Ol  
nallely   
(AREXVY)                                            Jake Jones MD  
Work Phone:   
[phone]                          [facility]  
Work Phone:   
[phone]  
   
                                        2023          Flu vaccine,   
quadrivalent,   
high-dose, preservative   
free, age 65y+   
(FLUZONE)                                           Jake Jones MD  
Work Phone:   
[phone]                          [facility]  
Work Phone:   
[phone]  
   
                                        2023          influenza virus   
vaccine, unspecified   
formulation                                         Jake Jones MD  
Work Phone:   
[phone]                          [facility]  
Work Phone:   
[phone]  
   
                                        10-          Fluzone High-Dose   
Quadrivalent 0.7 ML   
Intramuscular   
Suspension Prefilled   
Syringe; Translations:   
[Fluzone High-Dose   
Quadrivalent 0.7 ML   
Intramuscular   
Suspension Prefilled   
Syringe]                                            Jake Jones  
Work Phone:   
[phone]                          [facility]  
Work Phone:   
[phone]  
   
                                        Comment on above:   Series:   
   
                                        10-          influenza, high dose  
   
seasonal,   
preservative-free                                   Jake Jones MD  
Work Phone:   
[phone]                          [facility]  
Work Phone:   
[phone]  
   
                                        10-          influenza, seasonal,  
   
injectable                                          Jake Jones  
Work Phone:   
[phone]                          [facility]  
Work Phone:   
[phone]  
   
                                        Comment on above:   Series:   
   
                                        10-          influenza virus   
vaccine, unspecified   
formulation                                         Jake Jones MD  
Work Phone:   
[phone]                          [facility]  
Work Phone:   
[phone]  
   
                                        10-          Pfizer-BioNTech   
COVID-19 Vacc 30   
MCG/0.3ML Intramuscular   
Suspension                                          Jake Jones  
Work Phone:   
[phone]                          [facility]  
Work Phone:   
[phone]  
   
                                        2021          Fluzone High-Dose   
Quadrivalent 0.7 ML   
Intramuscular   
Suspension Prefilled   
Syringe                                             Jake Jones  
Work Phone:   
[phone]                          [facility]  
Work Phone:   
[phone]  
   
                                        06-          tetanus toxoid, redu  
jerry   
diphtheria toxoid, and   
acellular pertussis   
vaccine, adsorbed;   
Translations: [Tdap   
(Boostrix)]                                         Jake Jones  
Work Phone:   
[phone]                          [facility]  
Work Phone:   
[phone]  
   
                                        Comment on above:   Series:   
   
                                        2021          Pfizer-BioNTech   
COVID-19 Vacc 30   
MCG/0.3ML Intramuscular   
Suspension                                          Jake Jones  
Work Phone:   
[phone]                          [facility]  
Work Phone:   
[phone]  
   
                                        2021          Pfizer-BioNTech   
COVID-19 Vacc 30   
MCG/0.3ML Intramuscular   
Suspension                                          Jake Jones  
Work Phone:   
[phone]                          [facility]  
Work Phone:   
[phone]  
   
                                        10-          Fluad Quadrivalent 0  
.5   
ML Intramuscular   
Prefilled Syringe                                   Jake Jones  
Work Phone:   
[phone]                          [facility]  
Work Phone:   
[phone]  
   
                                        10-          influenza, high dose  
   
seasonal,   
preservative-free                                   Jake Brunnerer  
Work Phone:   
[phone]                          [facility]  
Work Phone:   
[phone]  
   
                                        Comment on above:   Series:   
   
                                        10-          influenza, seasonal,  
   
injectable                                          Jake Brunenrer  
Work Phone:   
[phone]                          [facility]  
Work Phone:   
[phone]  
   
                                        Comment on above:   Series:   
   
                                        2019          influenza, high dose  
   
seasonal,   
preservative-free                                   Jake Jones  
Work Phone:   
[phone]                          [facility]  
Work Phone:   
[phone]  
   
                                        Comment on above:   Series:   
   
                                        2018          pneumococcal   
polysaccharide vaccine,   
23 valent;   
Translations:   
[Pneumovax 23 25   
MCG/0.5ML Injection   
Injectable]                                         Jake Jones  
Work Phone:   
[phone]                          [facility]  
Work Phone:   
[phone]  
   
                                        Comment on above:   Series:   
   
                                        2017          influenza virus   
vaccine, live,   
attenuated, for   
intranasal use;   
Translations: [FluMist   
LIQD]                                               Jake Brunnerer  
Work Phone:   
[phone]                          [facility]  
Work Phone:   
[phone]  
   
                                        Comment on above:   Series:   
   
                                        2017          pneumococcal conjuga  
te   
vaccine, 13 valent                                  Jake Brunnerer  
Work Phone:   
[phone]                          [facility]  
Work Phone:   
[phone]  
   
                                        Comment on above:   Series:   
   
                                        10-          influenza, seasonal,  
   
injectable,   
preservative free                                   Eber Bazan DO  
Work Phone:   
[phone]                          [facility]  
   
                                        10-          influenza virus   
vaccine, unspecified   
formulation                                         Eber Bazan DO  
Work Phone:   
[phone]                          [facility]  
   
                                        2011          tetanus toxoid, redu  
jerry   
diphtheria toxoid, and   
acellular pertussis   
vaccine, adsorbed                                   Jake Jones  
Work Phone:   
[phone]                          [facility]  
Work Phone:   
[phone]  
   
                                        Comment on above:   Series:   
   
                                        10-          pneumococcal vaccine  
,   
unspecified formulation                             Eber Bazan DO  
Work Phone:   
[phone]                          [facility]  
   
                                        2009          novel   
influenza-H1N1-09,   
preservative-free,   
injectable                                          Jake Jones  
Work Phone:   
[phone]                          [facility]  
Work Phone:   
[phone]  
  
  
  
Payers  
  
  
                          Date         Payer Category Payer        Policy ID  
   
                                2024      Medicare PPO    [employer] MEDICARE PPO  
 Member   
Subscriber Plan / Payer   
(Effective   
2024-Present)   
Name: Mihaela Heaton   
Member ID: [id number]   
Relation to Subscriber:   
Self Name: Mihaela Heaton Subscriber ID:   
[id number] Payer ID: [id number]   
(NAIC) Group ID: [id number]   
Type: Not on file Phone:   
[phone] Address: [facility] [address] [place] [address]                     [id number]  
[id number]  
   
                                2023      Medicare        [employer]ARE   
[id number]   
2023-Present  [address]   
[address] [place] [address]                [id number]  
[id number]  
   
                          2023   Private Health Insurance              [id number]  
[id number]  
   
                          1945   Unknown                   [id number]   
[id number].  
1945   Unknown                   [id number]   
[id number].  
1945   Unknown                   [id number]   
[id number].  
1069  
   
                          1945   Unknown                   [id number]   
[id number].  
1069  
   
                          1945   Unknown                   [id number]   
[id number].  
1069  
   
                          1945   Unknown                   [id number]   
[id number].  
356  
   
                          1945   Unknown                   [id number]   
[id number]  
356  
   
                          1945   Unknown                   [id number]   
[id number].  
356  
   
                          1945   Unknown                   [id number]   
[id number].  
356  
   
                          1945   Unknown                   [id number]   
[id number].  
356  
   
                          1945   Unknown                   [id number]   
[id number].  
356  
   
                          1945   Unknown                   [id number]   
[id number].  
356  
   
                          1945   Unknown                   [id number]   
[id number].  
356  
   
                          1945   Unknown                   [id number]   
[id number].  
1243  
   
                          1945   Unknown                   [id number]   
[id number].  
1245  
   
                          1945   Unknown                   [id number]   
[id number].  
[id number]   Unknown                   [id number]   
[id number].  
1245  
   
                          1945   Unknown                   [id number]   
[id number].  
[id number]   Unknown                   [id number]   
[id number].  
1245  
   
                          1945   Unknown                   [id number]   
[id number].  
1244  
   
                          1945   Unknown                   [id number]   
[id number].  
1244  
   
                          1945   Unknown                   [id number]   
[id number].  
[id number]   Unknown                   [id number]   
[id number].  
1945   Unknown                   [id number]   
[id number].  
1945   Unknown                   [id number]   
[id number].  
1244  
   
                          1945   Unknown                   [id number]   
[id number].  
900  
   
                          1945   Unknown                   [id number]   
[id number].  
900  
   
                          1945   Unknown                   [id number]   
[id number].  
903  
   
                          1945   Unknown                   [id number]   
[id number].  
1945   Unknown                   [id number]   
[id number].  
903  
   
                          1945   Unknown                   [id number]   
[id number].  
903  
   
                          1945   Unknown                   [id number]   
[id number].  
903  
   
                          1945   Unknown                   [id number]   
[id number].  
903  
   
                          1945   Unknown                   [id number]   
[id number].  
903  
   
                          1945   Unknown                   [id number]   
[id number].  
903  
   
                          1945   Unknown                   [id number]   
[id number].  
903  
   
                          1945   Unknown                   [id number]   
[id number].  
902  
   
                          1945   Unknown                   [id number]   
[id number].  
902  
   
                          1945   Unknown                   [id number]   
[id number].  
902  
   
                          1945   Unknown                   [id number]   
[id number].  
902  
   
                          1945   Unknown                   [id number]   
[id number].  
902  
   
                          1945   Unknown                   [id number]   
[id number]  
902  
   
                                       Private Health Insurance              101  
[id number]  
   
                                       Unknown                     
  
  
  
Social History  
  
  
                          Date         Type         Detail       Facility  
   
                                                    Start: 2023  
End: 2025                         Patient has living   
will                                    Patient has living   
will                                    [facility]  
Work Phone:   
[phone]  
   
                                                    Start: 2023  
End: 07-                         Tobacco smoking   
status NHIS               Ex-smoker                 [facility]  
Work Phone:   
[phone]  
   
                                                      
End: 1997                         History of tobacco   
use                       Current smoker            [facility]  
Work Phone:   
[phone]  
   
                                                      
End: 1997                         History of tobacco   
use                       Cigarette Smoker          [facility]  
Work Phone:   
[phone]  
   
                                                    Start: 2023  
End: 2024                         Tobacco use and   
exposure                                Smokeless tobacco   
non-user                                [facility]  
Work Phone:   
[phone]  
   
                                                    Start: 2023  
End: 2025     Tobacco use panel                       [facility]  
Work Phone:   
[phone]  
   
                          Start: 1945 Sex Assigned At Birth Not on file  [facility]  
Work Phone:   
[phone]  
   
                                                    Start: 2023  
End: 07-                         Exposure to   
SARS-CoV-2 (event)        Not sure                  [facility]  
   
                          Start: 2023 Alcohol intake Ex-drinker (finding)   
[facility]  
Work Phone:   
[phone]  
   
                                                    Start: 01-  
End: 07-           Alcohol intake            Current drinker of   
alcohol (finding)                       [facility]  
Work Phone:   
[phone]  
   
                                        Start: 2024   Tobacco smoking   
status NHIS               Never smoked tobacco      [facility]  
   
                                Start: 2024 Gender identity Identifies as   
male   
gender (finding)                        [facility]  
   
                          Start: 2024 Sexual orientation Heterosexual (fin  
ding) [facility]  
   
                                                            Adult Depression   
Screening Assessment      0                         [facility]  
   
                                                    NEGATED: Highlighted   
rowStart: [facility]CAF                          History of tobacco   
use                       Passive smoker            [facility]  
  
  
  
Clinical Notes 10- to 2025  
 Dimple Cartagena, SLP - 2025 8:45 AM Eber Orr DO -   
2025 10:26 AM ESTPatient InstructionsYoseph Barrientos - 2025 10:29 AM 
ESTPatient Instructions  
  
                                Note Date & Type Note            Facility  
   
                                                    2025 History of   
Present illness Narrative               Formatting of this note is different   
from the original.  
Images from the original note were   
not included.  
[facility]  
Speech Therapy Evaluation  
  
Today's Date 2025  
Patient Name: Mihaela Heaton MRN:   
[id number]  
YOB: 1945  
  
Case Name: ST-Dysphagia  
Functional Diagnosis:  
1. Oropharyngeal dysphagia  
  
Clinical Information:  
  
Subjective  
Referring Diagnosis: Oropharyngeal   
dysphagia  
Patient accompanied by: spouse  
History of Present Illness  
Date of Onset: swallowing difficulty   
for 10+ years.  
Contemporary Medical History: Per ENT   
visit with laryngoscopy,  Examination   
of nasopharynx shows normal adenoid   
and intact and patent eustachian tube   
orifices. Velopharyngeal closure is   
intact.  
  
Examination of the pharynx reveals   
the lateral and posterior pharyngeal   
wall mucosa is normal moist. Tonsils   
are normal. The tongue base is   
normal. Examination of the   
hypopharynx, vallecula, and pyriform   
sinus reveals normal. The epiglottis   
is normal. Examination of the vocal   
folds reveals normal mucosal and   
mobility bilaterally. The arytenoid   
and post-cricoid mucosa is normal.   
The visualized portions of the   
subglottis is normal.    
Subjective History: Patient is a 80 y/o male referred by Dr. Mahoney d/t   
dysphagia. Reported changes impact   
the patient's ability to consume   
solids with more effortful   
swallowing. Swallowing changes began   
10+ years ago, however, cough   
worsened with recent bronchitis.   
Patient's relevant medical history   
includes: PND, GERD, COPD,   
bronchitis, lung cancer in  with   
radiation. Additionally, patient has   
hx of Frye's esophagus which was   
managed with pantoprazole, however,   
patient recently stopped taking per   
PCP. Patient's chief complaint is:   
food gets stuck pointing at level of   
clavicular notch, mostly pills, or   
foods like nuts. Patient complains of   
mucus sticking in throat. Patient   
reports mild voice changes due to   
mucus in throat and increase in   
throat clearing/coughing. No use of   
supplemental oxygen. Patient denies   
odynophagia or unexplained weight   
loss. Patient takes pills with water,   
handful at a time, resulting often in   
globus sensation however, passes with   
eating food.  
  
Patient's prior level of function:   
Patient eats regular diet and thin   
liquids. Patient lives at home with   
spouse.  
  
Previous Treatment for this   
condition: No  
Pain Scale  
Average Pain: 0/10  
Social Support:  
Patient lives with others.  
Additional Social Support: lives with   
spouse  
Activities of Daily Living:   
independent with all  
Instrumental Activities of Daily   
Livingindependent with all  
Medication Management: independent  
Meal Preparation - Low Complexity:   
independent  
Meal Preparation - Moderate   
Complexity: independent  
Meal Preparation - High Complexity:   
independent  
Housekeeping:  
Light Cleaning: independent  
Heavy Cleaning: independent  
Yardwork:  
Gardening: independent  
Mowing: independent  
Basic Financial Management:   
independent  
Driving: independent  
Red Flags: None  
Comments:  
Barriers to Care: None  
  
Evaluation  
  
Bedside Swallow Eval - 25 0837  
  
  
Prior Function  
Dysphagia Onset 10+ years ago  
Patient Complaint Yes  
Date of Previous MBS n/a  
  
Baseline Assessment  
Respiratory Status Room air  
Behavior/Cognition   
Alert;Cooperative;Pleasant mood  
Dentition Permanent  
Vision Functional for self-feeding  
Patient Positioning Upright in chair  
Baseline Vocal Quality Normal  
Volitional Cough Strong  
Baseline Cough Congested  
Volitional Swallow Present  
  
Oral/Motor  
Oral Motor Impression-Severity Scale   
WFL  
Labial ROM WFL  
Labial Symmetry at Rest WFL  
Lingual ROM WFL  
Velum WFL  
Mandible WFL  
Facial ROM WFL  
Facial Symmetry at Rest WFL  
Apraxia None present  
  
Consistencies Assessed  
Consistencies Assessed NDD Diet  
Risk for Aspiration Yes  
  
Puree  
Presentation Self fed  
Oral WFL  
Pharyngeal WFL  
  
Thin  
Presentation Cup;Straw  
Oral WFL  
Pharyngeal Throat clearing -   
immediate;Throat clearing - delayed  
  
Solid  
Presentation Self Fed  
Oral WFL  
Pharyngeal Cough - delayed;Throat   
clearing - immediate;Effortful   
swallow;Multiple swallows per bolus   
 i usually have to chew it up real   
good or it won't go down   
  
Impressions-Severity  
Oral Severity Scale WFL  
Pharyngeal Severity Scale Suspect   
minimal  
  
Recommendations  
Recommendations Modified barium   
swallow study;Dysphagia treatment  
Diet Solids Recommendation IDDSI   
Level 7 regular  
Solids Presentation Small Bites  
Diet Liquids Recommendations Thin  
Liquid Presentation Small sips;Cup  
Compensatory Swallowing Strategies   
Remain upright for 20-30 minutes   
after meals;Alternate solids and   
liquids;Effortful swallow  
Medication Presentation Whole;With   
liquid;With puree  
Amount of Supervision Not required  
Suggested Consults ENT  
  
  
  
  
  
  
Eating Assessment Tool (EAT-10)  
This questionnaire helps to assess   
subjective swallowing difficulties   
based on rating responses from 0 (no   
problem) to 4(severe problem).  
  
My swallowing problem has caused me   
to lose weight 0  
My swallowing problem interferes with   
my ability to go out for meals 0  
Swallowing liquids takes extra effort   
0  
Swallowing solids takes extra effort   
1  
Swallowing pills takes extra effort 4  
Swallowing is painful 0  
The pleasure of eating is affected by   
my swallowing 0  
When I swallow food sticks in my   
throat 2  
I cough when I eat 0  
Swallowing is stressful 0  
Total 7  
  
Treatment Plan:  
  
Frequency of Visits: once per week  
Duration: 4 weeks  
Interventions: swallow/ oral function   
complex treatment (98379)  
Rehab Potential: good  
  
  
Goals:  
Speech Therapy: Swallowing: LT.   
Patient will utilize designated   
swallow strategies during consumption   
of least restrictive diet with no   
cues with no overt s/s of aspiration   
by 3/27/2025.  
2. Patient will complete instrumental   
assessment of swallowing to further   
assess oropharyngeal swallowing   
safety and efficiency by 2025.  
  
ST. Patient to demonstrate appropriate   
use of designated strategies and   
precautions to compensate for   
identified deficits.  
2. Patient to improve laryngeal   
elevation/strength/movement and   
pharyngeal constriction to facilitate   
increased oropharyngeal transfer and   
bolus control via therapeutic   
exercises.  
3. Patient will be educated to HEP   
including (exercises and strategies)   
and demonstrated understanding via   
verbal response and/or demonstration.  
  
  
  
  
  
  
Patient Education provided: Discussed   
Plan of care frequency and duration,   
treatment plan, importance of   
attendance for recovery, team   
concept, and   
diagnosis/pathophysiology/prognosis.   
Pt is in agreement with plan, and all   
questions at this time were answered.  
  
Clinical Impression: Mihaela Heaton   
presents to [facility] outpatient   
neurological rehab services on   
2025 for a clinical dysphagia   
evaluation s/p dysphagia symptoms   
with worsening cough. Pt's chief   
complaints include: globus sensation   
with solids,  tickle  in throat with   
liquids.  
  
Clinical dysphagia evaluation   
completed this date. Trials assessed   
included: thin liquid via cup and   
straw, puree via teaspoon, and   
regular consistency. Oral phase   
appeared WFL, however, patient   
reports  sometimes it's hard to start   
the swallow . Suspect mild pharyngeal   
phase dysphagia characterized by   
reduced hyolaryngeal elevation via   
manual palpation, immediate throat   
clear , delayed throat clear,   
immediate cough, and delayed cough.   
Pt exhibited s/s of aspiration noted   
with the following trials thin   
liquids and solids characterized by   
immediate and delayed throat   
clearing/coughing. Chin tuck   
positioning did not reduce s/sx of   
possible laryngeal aspiration. PO   
Recommendations: regular solids and   
thin liquids, Strategies: alternate   
solids and liquids, extra swallow   
after every 2-3 bites, Posture:   
upright by 90 degrees, Food   
Presentation: small bites, Liquid   
Presentation: small/average sips,   
Medication Presentation: 1-2 at a   
time time with liquids or puree.   
Recommend completion of modified   
barium swallow study to further   
assess oropharyngeal swallowing   
safety and efficiency.  
  
The documented impairments result in   
the following functional limitations:   
quality of life and effectiveness and   
swallowing safety for nutritional   
needs.  
  
Potential barriers to rehab include:   
chronicity of the current condition.   
The patient would benefit from   
skilled ST services focused on the   
above listed impairments and   
limitations in order to achieve the   
goals as noted above.  
  
Plan of care to be revised as needed   
based on response to therapeutic   
intervention. Thank you for allowing   
me to participate in this patient's   
care. Please contact me with any   
questions at the above number.  
  
  
SARA Scott  
State License, [id number]  
Electronically signed by Dimple Cartagena SLP at 2025 9:25 AM   
EST  
documented in this encounter            [facility]  
   
                                                    2025 History of   
Present illness Narrative               Formatting of this note is different   
from the original.  
Subjective:  
Mihaela Heaton is a 79 y.o. male here   
for a Medicare Annual Wellness Visit   
and follow-up on COPD and other   
medical problems.  
  
Additional concerns addressed at   
today's visit - patient aware   
additional codes may be added if   
additional topics covered at today's   
Medicare Annual Wellness Visit and   
they may be responsible for part of   
charges depending on their coverage   
policy.  
  
HPI  
History of Present Illness  
The patient is a 79-year-old male who   
presents for a Medicare wellness   
visit. He is accompanied by his wife.  
  
He has been experiencing persistent   
mucus accumulation in his throat,   
leading to a chronic cough since   
November. The cough is productive,   
with significant mucus production,   
which he attributes to sinus issues.   
He reports no color in the mucus and   
notes that it rarely does not come   
up. He continues to take Trelegy   
daily without any issues. He has an   
upcoming appointment with his   
pulmonologist next week. He also   
reports that his symptoms are worse   
at night when lying down, but less   
severe when lying on either side. He   
describes a gurgling sensation when   
breathing in and out, which disrupts   
his sleep. He has an upcoming   
appointment with his pulmonologist   
next week. Despite treatment with   
intravenous steroids and a Z-Amandeep, his   
symptoms have progressively worsened.   
He sought care from Dr. Daly,   
who prescribed a different   
medication, which also failed to   
alleviate his symptoms. He has tried   
Mucinex and other sinus treatments   
with minimal relief. He has used a   
rescue inhaler in the past for   
shortness of breath, but found it   
ineffective. He has an upcoming   
appointment with his pulmonologist   
next week. He was initially diagnosed   
with bronchitis in the emergency   
room. A chest x-ray performed at the   
ER was reported as normal by the   
radiologist, but Dr. Mahoney, an ENT   
specialist, disagreed with this   
assessment. He underwent a nasal   
endoscopy, which revealed normal,   
non-inflamed sinuses. Dr. Mahoney   
suggested a possible swallowing or   
lung issue and scheduled him for a   
swallow test and CT scan on . He   
underwent surgery in  to remove   
the upper left lobe of his lung.  
  
He reports occasional unsteadiness on   
his feet, particularly when rising   
quickly from a seated position. He   
uses a cane for mobility. He   
experiences dizziness when moving his   
eyes around and has to hold onto   
handrails in the shower to prevent   
falls. He has a tendency to wander   
while walking. His last fall occurred   
in his living room when he bent down   
to unplug something and lost   
consciousness upon standing up.  
  
He has been without his levothyroxine   
50 mcg for 3 days and has not   
requested a refill. He plans to do so   
today. He has a history of thyroid   
issues, which were exacerbated by   
amiodarone, leading to cardioversion.   
He reports that his thyroid function   
has been stable since discontinuing   
amiodarone.  
  
He is currently on pantoprazole 40 mg   
daily for Frye's esophagus, which   
he was told has resolved over time.   
He is considering discontinuing   
pantoprazole.  
  
He is under the care of a urologist   
due to an elevated PSA level of 7. A   
biopsy confirmed the diagnosis of   
prostate cancer, which is currently   
under surveillance. He was advised to   
see a specialist for further   
management.  
  
He has not been screened for   
hepatitis C. He has received the RSV   
vaccine, two doses of the COVID-19   
vaccine, and one booster. He was   
advised against receiving another   
booster by Dr. Ortiz. He has also   
received the influenza vaccine and is   
awaiting improvement in his health   
before receiving the shingles   
vaccine. He has a follow-up   
appointment with Dr. Escobedo next week.  
  
He has been monitoring his blood   
pressure at home daily for a month,   
with readings typically around   
120/80, occasionally reaching 140 or   
dropping to 99. He is on various   
antihypertensive medications. He   
admits to inadequate water intake due   
to a dislike for water, but has been   
drinking more recently due to   
increased thirst. He reports frequent   
urination.  
  
MEDICATIONS  
Trelegy, latanoprost drops,   
levothyroxine, pravastatin, Xarelto,   
pantoprazole, metoprolol, lisinopril   
hydrochlorothiazide  
  
IMMUNIZATIONS  
He has had the first two shots for   
COVID-19 and one booster. He has also   
received the influenza vaccine.  
  
Review of Systems  
  
Reviewed by Provider:  
  
  
Care Team  
Patient Care Team:  
Eber Bazan DO as PCP -   
General (Family Medicine)  
  
Pharmacy / Equipment Co. (DME)  
  
[id number] - [place], [place] - [address]  
[address] [address]  
Phone: [phone] Fax: [phone]  
  
[facility] - [place], [place] - [address]  
[address]  
[address] [address]  
Phone: [phone] Fax: [phone]  
  
Medicare Risk Assessment  
Do you have an Advanced Directive   
(Living Will and/or Durable Power of   
 for Health Care)? If not,   
would you like more information about   
Advanced Directives?: Yes  
What is your exercise level?: None  
What is your diet?: Regular  
Can you prepare your own meals?: Yes  
Do you have trouble with finding   
transportation?: No  
Because of any health problems, do   
you need the help of another person   
with your personal care needs? (For   
example, eating, bathing, dressing,   
or getting around the house.): No  
Does your home have throw rugs, poor   
lighting or slippery bathtub or   
shower?: None of the above  
Does your home have grab bars in   
bathrooms or handrails on stairs and   
steps?: Both grab bars in the   
bathroom and Handrails on the stairs  
During the past four weeks, how would   
you rate your health in general?:   
Fair  
Whether or not you use a hearing aid,   
do you think you have a hearing   
problem or do others think you have a   
hearing problem?: (!) Yes  
Whether or not you use glasses or   
contacts, do you have difficulty   
driving, watching television,   
reading, or doing any of your daily   
activities because of your eyesight?:   
No  
In the past six months, have you had   
an unexplained weight loss of 10   
pounds or more?: No  
Do you take your medications as   
prescribed?: I do not miss doses of   
my medications  
During the past four weeks, how much   
have you been bothered by emotional   
problems such as feeling anxious,   
depressed, irritable, sad, or   
downhearted and blue?: Not at all  
During the past four weeks, has your   
physical and emotional health limited   
your social activites with family,   
friends, neighbors, or groups? : Not   
at all  
Provider/Supplier Name and Specialty:   
Dr Mahoney, heart and pulmonary,   
[facility], urologist  
  
Falls Risk Assessment  
Is Patient Ambulatory?: Y  
Fell in past year: 2 (Yes)  
How many falls in the past year?: 2  
Did any of these falls result in an   
injury?: Yes  
Unsteady when walks: 1 (Yes)  
Worried about fallin (Yes)  
Advised to use cane/walker?: 2 (Yes)  
Type of assistive device: cane  
Holds onto furniture/walls: 1 (Yes)  
Uses hands to stand up from a chair:   
1 (Yes)  
Trouble stepping onto curb: 1 (Yes)  
Rushes to toilet: 0 (No)  
Lost feeling in feet: 0 (No)  
Medicine makes me light-headed: 0   
(No)  
Medicine for sleep or mood: 0 (No)  
Often feel sad/depressed: 0 (No)  
Patient Self Risk Assessment Score: 9  
  
Timed Up and Go (TUG): 13-20 seconds  
(LICENSED CLINICIANS ONLY)Postural   
stability, gait, stride length, and   
sway: Slow tentative pace  
  
Medicare Mini Cog  
Step 1: Three Word Registration  
Version Used: Version 3: Village,   
Kitchen, Baby  
  
Step 2: Clock Drawing  
Step 2 score: Normal clock - 2 points   
Clock has all numbers placed in the   
correct sequence & position (e.g.,   
12, 3, 6 and 9 are in anchor   
positions) with no missing or   
duplicate numbers. Hands are pointing   
to the 11 & 2 (11:10). Hand length is   
not scored.  
  
Step 3: Three Word Recall  
Say: What were the three words I   
asked you to remember? : village and   
baby  
Step 3: Three Word Recall Score: 2   
Words Recalled  
  
Total score = Word Recall score +   
Clock Draw score  
A cut point of <3 on the Mini-Cog has   
been validated for dementia   
screening, but many individuals with   
clinically meaningful cognitive   
impairment will score higher. A cut   
point of <4 may indicate a need for   
further evaluation of cognitive   
status.: 4  
  
5-Year Plan:  
Health Maintenance  
Topic Date Due  
Hepatitis C Screening Never done  
Zoster Vaccines (1 of 2) Never done  
Respiratory Syncytial Virus   
Immunization: Pregnancy Risk, 60-74   
Risk, or 75+ (1 - 1-dose 75+ series)   
Never done  
COVID-19 Vaccine (2024- season)   
2024  
Falls Risk Assessment 2026  
Depression Screening/Follow-Up   
(PHQ-2/9) 2026  
Wellness Visit 2026  
Tetanus: Every 10yrs 06/15/2031  
Pneumococcal Vaccine: Age 50+   
Completed  
Influenza Vaccine Completed  
  
Immunization History  
Administered Date(s) Administered  
Pfizer SARS-CoV-2 Vaccination   
2021, 2021, 10/22/2021  
  
Objective:  
/67 (BP Location: Left arm,   
Patient Position: Sitting, BP Cuff   
Size: Adult) Comment: at home reader   
  Pulse 84   Temp 98.1 F (36.7 C)   
(Temporal)   Resp 15   Ht 5' 11    Wt   
85.7 kg (189 lb)   SpO2 95%   BMI   
26.36 kg/m  
  
Hearing/Vision Screen  
No results found.  
  
Physical Exam  
Physical Exam  
Lungs are relatively clear.  
Heart has a regular rate and rhythm.   
No murmurs. Occasional splitting of   
S2, but otherwise no concerns on the   
exam.  
  
Vital Signs  
Blood pressure is 103/67.  
  
Assessment/Plan:  
Diagnoses and all orders for this   
visit:  
  
Medicare annual wellness visit,   
subsequent  
- Hepatitis C Ab with Reflex to HCV   
Virus Quantitation; Future  
  
At moderate risk for fall  
  
Abnormal finding of blood chemistry,   
unspecified  
- Hemoglobin A1c; Future  
- CBC; Future  
- Lipid Panel; Future  
- TSH with Reflex Free T4; Future  
- Comprehensive Metabolic Panel;   
Future  
  
Need for hepatitis C screening test  
- Hepatitis C Ab with Reflex to HCV   
Virus Quantitation; Future  
  
Asthma-COPD overlap syndrome (HCC)  
- Home Nebulizer () with   
Nebulizer Supplies other (comments)   
(LUZ AND NEBUSAL); as per   
nebulizer solution medication   
prescribed  
- levalbuterol (XOPENEX) 1.25 mg/0.5   
mL nebulizer solution; Take 0.5 mL   
(1.25 mg total) by nebulization every   
4 (four) hours as needed for wheezing   
.  
- sodium chloride 0.9 % nebulizer   
solution; Take 3 mL by nebulization   
as needed for wheezing (or excessive   
mucus) .  
  
Paroxysmal atrial fibrillation (HCC)  
- CBC; Future  
- Lipid Panel; Future  
- Comprehensive Metabolic Panel;   
Future  
- pravastatin (PRAVACHOL) 40 MG   
tablet; Take 1 (one) tablet (40 mg   
total) by mouth nightly .  
- metoprolol succinate (TOPROL-XL) 25   
MG 24 hr tablet; Take 1 (one) tablet   
(25 mg total) by mouth daily .  
- Xarelto 20 mg Tab; Take 1 (one)   
tablet (20 mg total) by mouth daily .  
  
Productive cough  
- sodium chloride 0.9 % nebulizer   
solution; Take 3 mL by nebulization   
as needed for wheezing (or excessive   
mucus) .  
  
Hypertension goal BP (blood pressure)   
< 140/90  
- lisinopriL-hydrochlorothiazide   
(PRINZIDE,ZESTORETIC) 20-12.5 mg per   
tablet; Take 1 (one) tablet by mouth   
daily .  
  
Orthostatic hypotension  
  
Acquired hypothyroidism  
- TSH with Reflex Free T4; Future  
- levothyroxine (SYNTHROID,   
LEVOTHROID) 50 MCG tablet; Take 1   
(one) tablet (50 mcg total) by mouth   
every morning .  
  
History of Frye's esophagus  
- pantoprazole (PROTONIX) 40 MG   
tablet; Take 1 (one) tablet (40 mg   
total) by mouth daily .  
  
Assessment & Plan  
1. Medicare annual wellness visit.  
Advanced directives, medication   
regimen, cognitive function, and fall   
risk were discussed. Cognitive   
function is satisfactory, but fall   
risk is moderate to high. Blood   
pressure is currently low, with   
readings of 99/66 and 103/67,   
indicating potential overcontrol.   
This could be due to dehydration from   
hydrochlorothiazide or excessive   
medication. Orthostatic hypotension   
is likely related to low blood   
pressure. Thyroid function is   
uncertain, with the last test   
conducted in May 2024. Thyroid issues   
began after starting amiodarone,   
which is no longer in use. He is not   
currently in atrial fibrillation,   
suggesting metoprolol is effective.   
He is also on Xarelto and is under   
the care of a cardiologist. Annual   
labs have been ordered. Blood   
pressure medication dosage will be   
reduced to lisinopril   
hydrochlorothiazide 20-12.5.   
Levothyroxine 50 mcg will be   
refilled. He is advised to bring his   
home cuff for accuracy verification.   
He is advised to taper off   
pantoprazole, taking it every other   
day for a week before discontinuing.   
Rebound acid reflux symptoms may   
occur, and if they persist after a   
week, resumption of pantoprazole may   
be necessary. He is advised to   
continue surveillance with his   
urologist.  
  
2. Asthma-COPD overlap syndrome with   
a productive cough.  
The productive cough has persisted   
for several months, raising concerns   
for chronic bronchitis. He is under   
the care of an ENT specialist and a   
pulmonologist, and is currently on   
Trelegy. The ENT specialist is   
pursuing further workup of his   
productive cough as well, and a CT   
scan is pending. Use of saline   
nebulized solutions and Mucinex is   
recommended to help with the   
productive cough. A prescription for   
the nebulizer and nebulizer solutions   
has been provided. He is advised to   
seek instruction from the pharmacy on   
proper nebulizer use.  
  
3. Hypertension.  
The goal blood pressure is less than   
140/90, but current readings are low   
at 99/66 and 103/67. This could be   
due to dehydration from   
hydrochlorothiazide or excessive   
medication. The dosage of lisinopril   
hydrochlorothiazide will be reduced   
to 20-12.5. Lab results will guide   
future decisions on   
hydrochlorothiazide use and potential   
changes in blood pressure medication.  
  
4. Orthostatic hypotension.  
This is likely related to low blood   
pressure. Blood pressure management   
will be adjusted to address this   
symptom. If orthostatic hypotension   
persists despite these adjustments,   
further interventions will be   
considered.  
  
5. Acquired hypothyroidism.  
Thyroid function is currently   
uncertain. He has been taking   
levothyroxine 50 mcg daily. Thyroid   
issues began after starting   
amiodarone, which is no longer in   
use. Thyroid function will be   
assessed to guide future   
levothyroxine supplementation.  
  
6. History of Frye's esophagus.  
He is currently on Protonix 40 mg   
daily. He feels he can taper off this   
because his last EGD showed   
improvement in any concerns for   
Frye's. He is advised to taper off   
pantoprazole, taking it every other   
day for a week before discontinuing.   
Rebound acid reflux symptoms may   
occur, and if they persist after a   
week, resumption of pantoprazole may   
be necessary.  
  
7. Atrial fibrillation.  
He is not currently in atrial   
fibrillation, suggesting metoprolol   
is effective. He is also on Xarelto   
and is under the care of a   
cardiologist.  
  
Follow-up  
The patient will follow up in 6   
weeks.  
  
PROCEDURE  
Nasal endoscopy revealed normal,   
non-inflamed sinuses. Surgery to   
remove the upper left lobe of the   
lung was performed in .  
  
No follow-ups on file.  
  
Patient Instructions (the written   
plan) and additional handouts   
provided to the patient with their   
After Visit Summary.  
  
In addition to an annual wellness   
visit, an E&M visit was also   
completed.  
  
E&M coding based on medical decision   
making complexity.  
  
  
Electronically signed by   
Eber Bazan DO at   
2025 1:15 PM EST  
documented in this encounter            [facility]  
   
                                        2025 Instructions   
  
  
Eber Bazan DO -   
2025 10:26 AM ESTFormatting of   
this note is different from the   
original.  
Images from the original note were   
not included.  
STEADI Moderate Risk Patient   
Instructions:  
  
Your Falls Screening today shows that   
you are at moderate risk for falls.   
To protect yourself from falls and   
maintain your independence, we   
recommend:  
  
1. Read through the brochure, What   
You Can Do to Prevent Falls (from   
[facility]).  
  
2. Go through the brochure, Check for   
Safety: A Home Fall Prevention   
Checklist for Older Adults (from   
[facility]), and make changes as   
recommended.  
  
3. We have referred you to physical   
therapy. You can either go to   
outpatient physical therapy or have a   
therapist come to your home.  
  
4. After you have completed your   
physical therapy, you are encouraged   
to join a community falls prevention   
program:  
  
Stepping On, a 7-week evidence based   
program that teaches balance   
exercises and fall prevention   
strategies  
  
Darryl Chi for older adults, group   
exercise that teaches Darryl Chi forms   
that reduce fall risk (weight   
shifting, postural alignment and   
control, and coordinated movements of   
the arms, legs, head, and trunk)  
  
Matter of Balance, an evidence based   
program designed to reduce the fear   
of falling and increase activity   
levels of older adults  
  
OR an exercise class for strength and   
balance.  
  
5. Take your Vitamin D with or   
without Calcium, as determined by   
your healthcare provider.  
  
6. Get your vision and hearing   
checked annually.  
  
Falls At Home  
  
Each year, thousands of older   
Americans fall at home. Many of them   
are seriously injured, and some are   
disabled. In 2011, nearly 23,000   
people over age 65  and 2.4   
million were treated in emergency   
departments because of falls.  
  
Falls are often due to hazards that   
are easy to overlook but easy to fix.   
This checklist will help you find and   
fix those hazards in your home.  
  
The checklist asks about hazards   
found in each room of your home. For   
each hazard, the checklist tells you   
how to fix the problem. At the end of   
the checklist, you ll find other tips   
for preventing falls.  
  
FLOORS: Look at the floor in each   
room.  
  
Q: When you walk through a room, do   
you have to walk around furniture?  
A. Ask someone to move the furniture   
so your path is clear  
Q: Do you have throw rugs on the   
floor?  
A. Remove the rugs or use   
double-sided tape or a non-slip   
backing so the rugs won t slip.  
Q: Are there papers, books, towels,   
shoes, magazines, boxes, blankets, or   
other objects on the floor?  
A. things that are on the   
floor. Always keep objects off the   
floor.  
Q: Do you have to walk over or around   
wires or cords (like lamp, telephone,   
or extension cords)?  
A. Coil or tape cords and wires next   
to the wall so you can t trip over   
them. If needed, have an    
put in another outlet.  
  
STAIRS AND STEPS: Look at the stairs   
you use both inside and outside your   
home.  
  
Q: Are there papers, shoes, books, or   
other objects on the stairs?  
A.  things on the stairs.   
Always keep objects off stairs.  
Q: Are some steps broken or uneven?  
A. Fix loose or uneven steps.  
Q: Are you missing a light over the   
stairway?  
A. Have an  put in an   
overhead light at the top and bottom   
of the stairs.  
Q: Do you have only one light switch   
for your stairs (only at the top or   
at the bottom of the stairs)?  
A. Have an  put in a light   
switch at the top and bottom of the   
stairs. You can get light switches   
that glow.  
Q: Has the stairway light bulb burned   
out?  
A. Have a friend or family member   
change the light bulb.  
Q: Is the carpet on the steps loose   
or torn?  
A. Make sure the carpet is firmly   
attached to every step, or remove the   
carpet and attach non-slip rubber   
treads to the stairs.  
Q: Are the handrails loose or broken?   
Is there a handrail on only one side   
of the stairs?  
A. Fix loose handrails or put in new   
ones. Make sure handrails are on both   
sides of the stairs and are as long   
as the stairs.  
  
KITCHEN: Look at your kitchen and   
eating area.  
  
Q: Are the things you use often on   
high shelves?  
A. Move items in your cabinets. Keep   
things you use often on the lower   
shelves (about waist level).  
Q: Is your step stool unsteady?  
A. If you must use a step stool, get   
one with a bar to hold on to. Never   
use a chair as a step stool.  
  
BATHROOMS: Look at all your   
bathrooms.  
  
Q: Is the tub or shower floor   
slippery?  
A. Put a non-slip rubber mat or   
self-stick strips on the floor of the   
tub or shower.  
Q: Do you need some support when you   
get in and out of the tub or up from   
the toilet?  
A. Have grab bars put in next to and   
inside the tub and next to the   
toilet.  
  
BEDROOMS: Look at all your bedrooms.  
  
Q: Is the light near the bed hard to   
reach?  
A. Place a lamp close to the bed   
where it s easy to reach.  
Q: Is the path from your bed to the   
bathroom dark?  
A. Put in a night-light so you can   
see where you re walking. Some   
night-lights go on by themselves   
after dark.  
  
Other Things You Can Do to Prevent   
Falls  
  
Do exercises that improve your   
balance and make your legs stronger.   
Exercise also helps you feel better   
and more confident.  
  
Have your doctor or pharmacist look   
at all the medicines you take, even   
over-the-counter medicines. Some   
medicines can make you sleepy or   
dizzy.  
  
Have your eyes checked by an eye   
doctor at least once a year and   
update your glasses.  
  
Get up slowly after you sit or lie   
down.  
  
Wear shoes both inside and outside   
the house. Avoid going barefoot or   
wearing slippers.  
  
Improve the lighting in your home.   
Put in brighter light bulbs.   
Florescent bulbs are bright and cost   
less to use.  
  
It s safest to have uniform lighting   
in a room. Add lighting to dark   
areas. Hang lightweight curtains or   
shades to reduce glare.  
  
Paint a contrasting color on the top   
edge of all steps so you can see the   
stairs better.  
For example, use a light color paint   
on dark wood.  
  
To access this brochure online,   
please visit the CDC website at   
http://www.cdc.gov/steadi/pdf/check_f  
or_safety_brochure-a.pdf  
  
Chair Rise Exercise  
  
What it does: Strengthens the muscles   
in your thighs & buttocks.  
  
Goal: To do this exercise without   
using your hands as you become   
stronger.  
  
How to do it:  
  
1. Sit toward the front of a sturdy   
chair with your knees bent & feet   
flat on the floor shoulder-width   
apart  
  
2. Rest your hands lightly on the   
seat on either side of you, keeping   
your back & neck straight & and chest   
slightly forward.  
  
3. Breathe in slowly. Lean forward &   
feel your weight on the front of your   
feet.  
  
4. Breathe out and slowly stand up,   
using your hands as little as   
possible.  
  
5. Pause for a full breath in & out.  
  
6. Breathe in as you slowly sit down.   
Do not let yourself collapse back   
down into the chair. Rather, control   
your lowering as much as possible.  
  
7. Breathe out.  
  
Repeat 10-15 times. If this number is   
too hard for you when you first start   
practicing this exercise, begin with   
fewer and work up to this number.  
  
Rest for a minute & then do a final   
set of 10-15.  
  
For detailed instructions, please   
visit the CDC website at   
http://www.cdc.gov/steadi/pdf/chair_r  
ise_exercise-a.pdf  
  
Stepping On is an evidence based   
program proven to reduce falls in   
older adults. It is a workshop   
offered once a week for seven weeks.   
In a small-group setting, you will   
learn balance exercises and develop   
specific knowledge and skills to   
prevent falls.  
  
Older adults who should attend are   
those who:  
  
are at risk of falling  
who have fallen one or more times  
lives at home  
are able to walk without the help of   
another person  
  
Local guest experts provide   
information on exercise, safety,   
vision, and medications.  
  
Classes are offered at [facility]. To find out   
specifics about a class, please call   
[phone].  
Darryl chi: Moving for Better Balance   
involves low impact exercise. The   
12-week class is offered for three   
hours per week and is led by a   
trained . It is   
intended for people aged 60 and   
older. Participants learn and perform   
a program of eight forms that   
progress from easy to more difficult.   
The program can accommodate persons   
with various physical conditions.  
  
Health Benefits of Darryl Chi: Moving   
for Better Balance:  
Improved social and mental   
well-being,  
Improved balance and physical   
functioning,  
Improved confidence in conducting   
daily activities,  
Reduced risk of falling and   
sustaining associated injuries, and  
Maintained independence and improved   
quality of life.  
  
To find a Darryl Chi program in your   
area or additional resources about   
fall prevention please contact: [facility]   
Violence and Injury Prevention   
Program at [phone] or   
[email or web address]  
  
A Matter of Balance: Managing   
Concerns about Falls is an evidence   
based program designed to reduce the   
fear of falling and increase activity   
levels of older adults. A trained   
facilitator leads 8 two-hour sessions   
for small groups of older adults.  
  
The class is intended for people 60   
and older who  
  
are at risk of falling  
have a fear of falling or restrict   
activities  
who have fallen in the past  
are interested in improving   
flexibility, balance, and strength.  
  
Participants will learn to view falls   
as controllable, set goals to   
increase activity levels, and reduce   
fall risks at home.  
  
Classes are offered in all [address] [place] [address] in [place]. For more   
information about specific classes   
near you, please visit   
http://aging.[place].gov/steadyu/resourc  
es/matterofbalance.aspx.  
Thank you for choosing our office for   
your Medicare Wellness Visit. Below   
you will find the plans we discussed   
today for your future medical   
treatments. Please keep this plan in   
a visible location so you can refer   
to it often and let our office know   
if you have any questions.  
  
5-Year Plan  
Health Maintenance  
Topic Date Due  
Hepatitis C Screening Never done  
Zoster Vaccines (1 of 2) Never done  
Respiratory Syncytial Virus   
Immunization: Pregnancy Risk, 60-74   
Risk, or 75+ (1 - 1-dose 75+ series)   
Never done  
COVID-19 Vaccine (2024- season)   
2024  
Falls Risk Assessment 2026  
Depression Screening/Follow-Up   
(PHQ-2/9) 2026  
Wellness Visit 2026  
Tetanus: Every 10yrs 06/15/2031  
Pneumococcal Vaccine: Age 50+   
Completed  
Influenza Vaccine Completed  
  
Learning About Living Tineo  
What is a living will?  
  
A living will, also called a   
declaration, is a legal form. It   
tells your family and your doctor   
your wishes when you can't speak for   
yourself. It's used by the health   
professionals who will treat you as   
you near the end of your life or if   
you get seriously hurt or ill.  
If you put your wishes in writing,   
your loved ones and others will know   
what kind of care you want. They   
won't need to guess. This can ease   
your mind and be helpful to others.   
And you can change or cancel your   
living will at any time.  
A living will is not the same as an   
estate or property will. An estate   
will explains what you want to happen   
with your money and property after   
you die.  
How do you use it?  
Keep these facts in mind about how a   
living will is used.  
Your living will is used only if you   
can't speak or make decisions for   
yourself. Most often, one or more   
doctors must certify that you can't   
speak or decide for yourself before   
your living will takes effect.  
If you get better and can speak for   
yourself again, you can accept or   
refuse any treatment. It doesn't   
matter what you said in your living   
will.  
Some states may limit your right to   
refuse treatment in certain cases.   
For example, you may need to clearly   
state in your living will that you   
don't want artificial hydration and   
nutrition, such as being fed through   
a tube.  
Is a living will a legal document?  
A living will is a legal document.   
Each state has its own laws about   
living tineo. And a living will may   
be called something else in your   
state.  
Here are some things to know about   
living tineo.  
You don't need an  to   
complete a living will. But legal   
advice can be helpful if your state's   
laws are unclear. It can also help if   
your health history is complicated or   
your family can't agree on what   
should be in your living will.  
You can change your living will at   
any time. Some people find that their   
wishes about end-of-life care change   
as their health changes. If you make   
big changes to your living will,   
complete a new form.  
If you move to another state, make   
sure that your living will is legal   
in the state where you now live. In   
most cases, doctors will respect your   
wishes even if you have a form from a   
different state.  
You might use a universal form that   
has been approved by many states.   
This kind of form can sometimes be   
filled out and stored online. Your   
digital copy will then be available   
wherever you have a connection to the   
internet. The doctors and nurses who   
need to treat you can find it right   
away.  
Your state may offer an online   
registry. This is another place where   
you can store your living will   
online.  
It's a good idea to get your living   
will notarized. This means using a   
person called a  to   
watch two people sign, or witness,   
your living will.  
What should you know when you create   
a living will?  
Here are some questions to ask   
yourself as you make your living   
will.  
Do you know enough about life support   
methods that might be used? If not,   
talk to your doctor so you know what   
might be done if you can't breathe on   
your own, your heart stops, or you   
can't swallow.  
What things would you still want to   
be able to do after you receive   
life-support methods? Would you want   
to be able to walk? To speak? To eat   
on your own? To live without the help   
of machines?  
Do you want certain Religion   
practices performed if you become   
very ill?  
If you have a choice, where do you   
want to be cared for? In your home?   
At a hospital or nursing home?  
If you have a choice at the end of   
your life, where would you prefer to   
die? At home? In a hospital or   
nursing home? Somewhere else?  
Would you prefer to be buried or   
cremated?  
Do you want your organs to be donated   
after you die?  
What should you do with your living   
will?  
Make sure that your family members   
and your health care agent have   
copies of your living will (also   
called a declaration).  
Give your doctor a copy of your   
living will. Ask to have it kept as   
part of your medical record. If you   
have more than one doctor, make sure   
that each one has a copy.  
Put a copy of your living will where   
it can be easily found. For example,   
some people may put a copy on their   
refrigerator door. If you are using a   
digital copy, be sure your doctor,   
family members, and health care agent   
know how to find and access it.  
Where can you learn more?  
Log into your personal health record   
on [email or web address] and   
enter K356 in the  Education  box to   
learn more about  Learning About   
Living Tineo.   
Current as of: 2023  
Content Version: 14.1  
3484-0780 [employer].  
Care instructions adapted under   
license by your healthcare   
professional. If you have questions   
about a medical condition or this   
instruction, always ask your   
healthcare professional. [employer] disclaims any warranty   
or liability for your use of this   
information.  
  
Learning About Being Physically   
Active  
What is physical activity?  
  
Being physically active means doing   
any kind of activity that gets your   
body moving.  
The types of physical activity that   
can help you get fit and stay healthy   
include:  
Aerobic or  cardio  activities. These   
make your heart beat faster and make   
you breathe harder, such as brisk   
walking, riding a bike, or running.   
They strengthen your heart and lungs   
and build up your endurance.  
Strength training activities. These   
make your muscles work against, or   
 resist,  something. Examples include   
lifting weights or doing push-ups.   
These activities help tone and   
strengthen your muscles and bones.  
Stretches. These let you move your   
joints and muscles through their full   
range of motion. Stretching helps you   
be more flexible.  
Reaching a balance between these   
three types of physical activity is   
important because each one   
contributes to your overall fitness.  
What are the benefits of being   
active?  
Being active is one of the best   
things you can do for your health. It   
helps you to:  
Feel stronger and have more energy to   
do all the things you like to do.  
Focus better at school or work.  
Feel, think, and sleep better.  
Reach and stay at a healthy weight.  
Lose fat and build lean muscle.  
Lower your risk for serious health   
problems, including diabetes, heart   
attack, high blood pressure, and some   
cancers.  
Keep your heart, lungs, bones,   
muscles, and joints strong and   
healthy.  
How can you make being active part of   
your life?  
Start slowly. Make it your long-term   
goal to get at least 30 minutes of   
exercise on most days of the week.   
Walking is a good choice. You also   
may want to do other activities, such   
as running, swimming, cycling, or   
playing tennis or team sports.  
Pick activities that you like--ones   
that make your heart beat faster,   
your muscles stronger, and your   
muscles and joints more flexible. If   
you find more than one thing you like   
doing, do them all. You don't have to   
do the same thing every day.  
Get your heart pumping every day. Any   
activity that makes your heart beat   
faster and keeps it at that rate for   
a while counts.  
Here are some great ways to get your   
heart beating faster:  
Go for a brisk walk, run, or hike.  
Go for a swim or bike ride.  
Take an online exercise class or   
dance.  
Play a game of touch football,   
basketball, or soccer.  
Play tennis, pickleball, or   
racquetball.  
Climb stairs.  
Even some household chores can be   
aerobic. Just do them at a faster   
pace. Raking or mowing the lawn,   
sweeping the garage, and vacuuming   
and cleaning your home all can help   
get your heart rate up.  
Strengthen your muscles during the   
week. You don't have to lift heavy   
weights or grow big, bulky muscles to   
get stronger. Doing a few simple   
activities that make your muscles   
work against, or  resist,  something   
can help you get stronger. Aim for at   
least twice a week.  
For example, you can:  
Do push-ups or sit-ups, which use   
your own body weight as resistance.  
Lift weights or dumbbells or use   
stretch bands at home or in a gym or   
community center.  
Stretch your muscles often.   
Stretching will help you as you   
become more active. It can help you   
stay flexible and loosen tight   
muscles. It can also help improve   
your balance and posture and can be a   
great way to relax.  
Be sure to stretch the muscles you'll   
be using when you work out. It's best   
to warm your muscles slightly before   
you stretch them. Walk or do some   
other light aerobic activity for a   
few minutes. Then start stretching.  
When you stretch your muscles:  
Do it slowly. Stretching is not about   
going fast or making sudden   
movements.  
Don't push or bounce during a   
stretch.  
Hold each stretch for at least 15 to   
30 seconds, if you can. You should   
feel a stretch in the muscle, but not   
pain.  
Breathe out as you do the stretch.   
Then breathe in as you hold the   
stretch. Don't hold your breath.  
If you're worried about how more   
activity might affect your health,   
have a checkup before you start.   
Follow any special advice your doctor   
gives you for getting a smart start.  
Where can you learn more?  
Log into your personal health record   
on [email or web address] and   
enter W332 in the  Education  box to   
learn more about  Learning About   
Being Physically Active.   
Current as of: 2023  
Content Version: 14.1  
[id number] [employer].  
Care instructions adapted under   
license by your healthcare   
professional. If you have questions   
about a medical condition or this   
instruction, always ask your   
healthcare professional. [employer] disclaims any warranty   
or liability for your use of this   
information.  
  
Muscle Conditioning: Exercises  
Introduction  
Here are some examples of exercises   
for muscle conditioning. Start each   
exercise slowly. Ease off the   
exercise if you start to have pain.  
Your doctor or physical therapist   
will tell you when you can start   
these exercises and which ones will   
work best for you.  
How to do the exercises  
Wall push-up  
  
Stand facing a wall with your feet   
about 12 to 24 inches from the wall.   
If you feel any pain when you do this   
exercise, stand closer to the wall.  
Place your hands on the wall at   
shoulder height, slightly wider apart   
than your shoulders. Turn your   
fingers out a little, rather than   
straight up and down.  
Slowly bend your elbows and bring   
your face toward the wall, keeping   
your shoulders and hips lined up.   
Then slowly push back to the starting   
position. Keep the motion smooth and   
controlled.  
Repeat 8 to 12 times.  
When you can do this exercise against   
a wall with ease and no pain, you can   
try it against a counter. You can   
then slowly progress to the end of a   
couch, then to a sturdy chair, and   
finally to the floor.  
Quadriceps (thigh) strengthening  
  
While sitting in a chair, straighten   
one leg and hold for 6 seconds. Do   
not lock your knee. Then slowly lower   
your leg.  
Repeat 8 to 12 times with each leg.  
When this exercise becomes easy, you   
can add a light weight to your ankle.  
Hip abduction (lying on side)  
  
Lie on your side, with your affected   
leg on top. You can use your hand or   
a pillow to support your head.  
Keep your knee straight and your leg   
in a straight line with your body.  
Lift your affected leg straight up   
toward the ceiling, about 12 inches   
off the floor. Hold for about 6   
seconds, then slowly lower your leg.  
Repeat 8 to 12 times.  
It's a good idea to repeat these   
steps on your other side.  
Keep your kneecap pointing forward.  
Don't let your hip drop back.  
Shallow standing knee bend  
  
Stand with your hands lightly resting   
on a counter or chair in front of   
you. Put your feet shoulder-width   
apart.  
Slowly bend your knees so that you   
squat down like you're going to sit   
in a chair. Make sure that your knees   
don't go in front of your toes.  
Lower yourself about 6 inches. Your   
heels should stay on the floor at all   
times.  
Rise slowly to a standing position.  
Repeat 8 to 12 times.  
Follow-up care is a key part of your   
treatment and safety. Be sure to make   
and go to all appointments, and call   
your doctor if you are having   
problems. It's also a good idea to   
know your test results and keep a   
list of the medicines you take.  
Current as of: 2023  
Content Version: 14.1  
[id number] [employer].  
Care instructions adapted under   
license by your healthcare   
professional. If you have questions   
about a medical condition or this   
instruction, always ask your   
healthcare professional. [employer] disclaims any warranty   
or liability for your use of this   
information.  
  
  
Electronically signed by   
Eber Bazan DO at   
2025 10:26 AM EST  
  
documented in this encounter            [facility]  
   
                                                    2025 History of   
Present illness Narrative               Formatting of this note is different   
from the original.  
Spoke to patient at this time,   
agreeable to completion of wellness   
visit at next scheduled appointment.   
[facility] outreach completed with patient.   
Flowsheets completed and filed and   
appointment type updated.  
  
Health Maintenance Due  
Name Date Due Outreach Comments  
Covid Booster Not interested.  
  
  
  
Do you have an Advanced Directive   
(Living Will and/or Durable Power of   
 for Health Care)? If not,   
would you like more information about   
Advanced Directives?: Yes  
What is your exercise level?: None  
What is your diet?: Regular  
Can you prepare your own meals?: Yes  
Do you have trouble with finding   
transportation?: No  
Because of any health problems, do   
you need the help of another person   
with your personal care needs? (For   
example, eating, bathing, dressing,   
or getting around the house.): No  
During the past four weeks, how would   
you rate your health in general?:   
Good  
Whether or not you use a hearing aid,   
do you think you have a hearing   
problem or do others think you have a   
hearing problem?: (!) Yes  
Whether or not you use glasses or   
contacts, do you have difficulty   
driving, watching television,   
reading, or doing any of your daily   
activities because of your eyesight?:   
No  
In the past six months, have you had   
an unexplained weight loss of 10   
pounds or more?: (!) Yes  
Do you take your medications as   
prescribed?: I do not miss doses of   
my medications  
During the past four weeks, how much   
have you been bothered by emotional   
problems such as feeling anxious,   
depressed, irritable, sad, or   
downhearted and blue?: Not at all  
During the past four weeks, has your   
physical and emotional health limited   
your social activites with family,   
friends, neighbors, or groups? : Not   
at all  
Provider/Supplier Name and Specialty:   
Cardiologist: Dr. Purdy;   
Pulmonologist: Dr. Lux;   
Ophthalmologist Dr. Fernandes; Endo: Dr. Saravia  
  
Is Patient Ambulatory?: Y  
Fell in past year: 2 (Yes)  
How many falls in the past year?: 2  
Did any of these falls result in an   
injury?: Yes  
Unsteady when walks: 1 (Yes)  
Worried about fallin (Yes)  
Advised to use cane/walker?: 2 (Yes)  
Type of assistive device: cane/walker  
Holds onto furniture/walls: 1 (Yes)  
Uses hands to stand up from a chair:   
1 (Yes)  
Trouble stepping onto curb: 0 (No)  
Rushes to toilet: 0 (No)  
Lost feeling in feet: 0 (No)  
Medicine makes me light-headed: 1   
(Yes) (not sure if its meds or not.)  
Medicine for sleep or mood: 0 (No)  
Often feel sad/depressed: 0 (No)  
Patient Self Risk Assessment Score: 9  
  
Over the last 2 weeks, how often have   
you been bothered by any of the   
following problems?  
Little interest or pleasure in doing   
things: Not at all  
Feeling down, depressed, or hopeless:   
Not at all  
PHQ-2 Total Score: 0  
  
Yoseph Barrientos  
Electronically signed by Yoseph Barrientos at 2025 10:38 AM EST  
documented in this encounter            [facility]  
   
                                        2025 Note      [address]  
 ([address])  
[facility]   
PHYSICIANS  
[address]  
MEDICAL OFFICE BUILDING  
[facility] [address]  
Dept: [phone]  
Loc: [phone]  
MD Mihaela Golden 79 y.o. male  
Patient presents with a chief   
complaint of New Patient (SINUS   
DRAINAGE AND COUGH  
X2 MONTHS)  
Temp 98.4 degrees F (36.9 degrees C)   
(Temporal)   Ht 5' 11    Wt 82.6 kg   
(182 lb)   BMI  
25.38 kg/m  
History of Presenting Illness:  
The patient/caregiver reports a   
history of complaint with the   
following  
features:  
Onset: started several years ago, but   
worse in last 2-3 months  
Timing: frequent but periodic  
Duration: several years  
Quality: post-nasal drip, phlegm in   
throat, trouble swallowing pills,   
productive  
cough  
Location: throat  
Severity: pain none  
Risk factors: COPD, GERD in past with   
Frye's esophagus, but was clear at   
most  
recent EGD  
Alleviating factors: worse laying on   
back at night, no relief with  
decongestants, steroids, antibiotics  
Aggravating factors: nothing makes it   
worse  
Associated factors: no nasal   
discharge  
He reports that he has had swallow   
testing at [facility] over 5 years ago.   
He has  
a history of lung cancer treated with   
radiation in .  
He reports that he has been having   
balance problems, falling forward if   
bends  
over, and has trouble with walking   
down hill or stairs. He reports that   
he has  
lost 15 lbs due to poor appetite. He   
feels his sense of smell is poor.  
Review of systems covering 10 systems   
is reviewed and pertinent positives   
and  
negatives are noted as above.  
Past Medical History:  
Diagnosis Date  
A-fib (HCC)  
Frye's esophagus  
COPD (chronic obstructive pulmonary   
disease) (HCC)  
Disease of thyroid gland  
Glaucoma  
Hyperlipidemia  
Hypertension  
Osteoporosis   
PE (pulmonary thromboembolism) (HCC)  
Current Outpatient Medications:  
latanoprost (XALATAN) 0.005 %   
ophthalmic solution, 1 (one) drop   
nightly .,  
Disp: , Rfl:  
levothyroxine (SYNTHROID, LEVOTHROID)   
50 MCG tablet, Take 1 tablet (50 mcg)   
by  
mouth once daily., Disp: , Rfl:  
lisinopriL-hydrochlorothiazide   
(PRINZIDE,ZESTORETIC) 20-25 mg per   
tablet, ,  
Disp: , Rfl:  
metoprolol succinate (TOPROL-XL) 25   
MG 24 hr tablet, , Disp: , Rfl:  
pantoprazole (PROTONIX) 40 MG tablet,   
, Disp: , Rfl:  
pravastatin (PRAVACHOL) 40 MG tablet,   
, Disp: , Rfl:  
Prolia 60 mg/mL Syrg, , Disp: , Rfl:  
Trelegy Ellipta 100-62.5-25 mcg DsDv,   
, Disp: , Rfl:  
Xarelto 20 mg Tab, , Disp: , Rfl:  
albuterol 90 mcg/actuation inhaler,   
Inhale 2 (two) puffs every 6 (six)   
hours  
as needed for wheezing ., Disp: 1 g,   
Rfl: 0  
azithromycin (ZITHROMAX) 250 MG   
tablet, 2 tablets day one, then 1   
tablet a day  
until gone ., Disp: 6 tablet, Rfl: 0  
Allergies  
Allergen Reactions  
Atorvastatin Other (See Comments)  
Amiodarone Other (See Comments) and   
Unknown  
Made thyroid levels abnormal  
Past Surgical History:  
Procedure Laterality Date  
CABG 2008  
x4  
CARDIAC CATHETERIZATION  
2017  
cardio version  
 and   
LUNG REMOVAL, PARTIAL Left 2000  
upper left lobe- jb  
stent rca   
Social History  
Socioeconomic History  
Marital status:   
Tobacco Use  
Smoking status: Never  
Passive exposure: Never  
Smokeless tobacco: Never  
Vaping Use  
Vaping status: Never Used  
Substance and Sexual Activity  
Drug use: Never  
History reviewed. No pertinent family   
history.  
PHYSICAL EXAM:  
The patient was examined today   
2025 with findings as follows:  
CONSTITUTIONAL:  
General Appearance: well-appearing,   
nontoxic, alert, no acute distress  
Communication: understanding at   
normal conversational tones, normal   
voicing,  
speech intelligible  
HEAD/FACE:  
Head: atraumatic, normocephalic, no   
lesions  
Facial Inspection: no lesions,   
healthy skin  
Facial Strength: motor strength   
normal, symmetric strength, symmetric   
movement  
Sinuses: no sinus tenderness  
Salivary Glands: no enlargements of   
parotid glands, no tenderness of   
parotid  
glands, no masses of parotid glands,   
clear salivary flow on palpation from  
Stensen's ducts, no duct stones of   
Stensen's duct, no enlargement of  
submandibular glands, no tenderness   
of submandibular glands, no masses of  
submandibular glands, clear salivary   
flow from Colbert's ducts, no stones   
of  
Colbert's ducts  
Temporomandibular Joint: no crepitus   
with motion, no tenderness on   
palpation, no  
trismus, motion symmetric  
EYES:  
Pupils: PERRLA, extra-ocular   
movements intact, no nystagmus,   
sclera white, no  
redness of eyes, no watering of eyes  
EARS:  
Bilateral External Ears: no pits, no   
tags  
Right External Ear: normally formed,   
no lesions, no mastoid tenderness  
Left External Ear: normally formed,   
no lesions, no mastoid tenderness  
Right External Auditory Canal:   
normal, healthy skin, no obstructing   
cerumen, no  
discharge  
Left External Auditory Canal: normal,   
healthy skin, no obstructing cerumen,   
no  
discharge  
Right Tympanic Membrane: normal   
landmarks, translucent, mobile to   
pneumatic  
otoscopy, no (more content not   
included)...                            [facility]  
   
                                                    2025 History of   
Present illness Narrative               Formatting of this note is different   
from the original.  
 [address] ([address])  
[facility] EAR[facility]SE [facility] TH[facility]   
PHYSICIANS  
[address]  
MEDICAL OFFICE [facility] [address]  
Dept: [phone]  
Loc: [phone]  
MD Mihaela Golden 79 y.o. male  
Patient presents with a chief   
complaint of New Patient (SINUS   
DRAINAGE AND COUGH X2 MONTHS)  
  
Temp 98.4 F (36.9 C) (Temporal)   Ht   
5' 11    Wt 82.6 kg (182 lb)   BMI   
25.38 kg/m  
  
History of Presenting Illness:  
  
The patient/caregiver reports a   
history of complaint with the   
following features:  
  
Onset: started several years ago, but   
worse in last 2-3 months  
Timing: frequent but periodic  
Duration: several years  
Quality: post-nasal drip, phlegm in   
throat, trouble swallowing pills,   
productive cough  
Location: throat  
Severity: pain none  
Risk factors: COPD, GERD in past with   
Frye's esophagus, but was clear at   
most recent EGD  
Alleviating factors: worse laying on   
back at night, no relief with   
decongestants, steroids, antibiotics  
Aggravating factors: nothing makes it   
worse  
Associated factors: no nasal   
discharge  
  
He reports that he has had swallow   
testing at [facility] over 5 years ago.   
He has a history of lung cancer   
treated with radiation in .  
  
He reports that he has been having   
balance problems, falling forward if   
bends over, and has trouble with   
walking down hill or stairs. He   
reports that he has lost 15 lbs due   
to poor appetite. He feels his sense   
of smell is poor.  
  
Review of systems covering 10 systems   
is reviewed and pertinent positives   
and negatives are noted as above.  
  
Past Medical History:  
Diagnosis Date  
A-fib ([facility])  
Frye's esophagus  
COPD (chronic obstructive pulmonary   
disease) ([facility])  
Disease of thyroid gland  
Glaucoma  
Hyperlipidemia  
Hypertension  
Osteoporosis   
PE (pulmonary thromboembolism) ([facility])  
  
Current Outpatient Medications:  
latanoprost (XALATAN) 0.005 %   
ophthalmic solution, 1 (one) drop   
nightly ., Disp: , Rfl:  
levothyroxine (SYNTHROID, LEVOTHROID)   
50 MCG tablet, Take 1 tablet (50 mcg)   
by mouth once daily., Disp: , Rfl:  
lisinopriL-hydrochlorothiazide   
(PRINZIDE,ZESTORETIC) 20-25 mg per   
tablet, , Disp: , Rfl:  
metoprolol succinate (TOPROL-XL) 25   
MG 24 hr tablet, , Disp: , Rfl:  
pantoprazole (PROTONIX) 40 MG tablet,   
, Disp: , Rfl:  
pravastatin (PRAVACHOL) 40 MG tablet,   
, Disp: , Rfl:  
Prolia 60 mg/mL Syrg, , Disp: , Rfl:  
Trelegy Ellipta 100-62.5-25 mcg DsDv,   
, Disp: , Rfl:  
Xarelto 20 mg Tab, , Disp: , Rfl:  
albuterol 90 mcg/actuation inhaler,   
Inhale 2 (two) puffs every 6 (six)   
hours as needed for wheezing ., Disp:   
1 g, Rfl: 0  
azithromycin (ZITHROMAX) 250 MG   
tablet, 2 tablets day one, then 1   
tablet a day until gone ., Disp: 6   
tablet, Rfl: 0  
Allergies  
Allergen Reactions  
Atorvastatin Other (See Comments)  
Amiodarone Other (See Comments) and   
Unknown  
Made thyroid levels abnormal  
  
Past Surgical History:  
Procedure Laterality Date  
CABG 2008  
x4  
CARDIAC CATHETERIZATION  
2017  
cardio version  
 and 2023  
LUNG REMOVAL, PARTIAL Left 2000  
upper left lobe- jb  
stent rca 1997  
  
Social History  
  
Socioeconomic History  
Marital status:   
Tobacco Use  
Smoking status: Never  
Passive exposure: Never  
Smokeless tobacco: Never  
Vaping Use  
Vaping status: Never Used  
Substance and Sexual Activity  
Drug use: Never  
  
History reviewed. No pertinent family   
history.  
  
PHYSICAL EXAM:  
  
The patient was examined today   
2025 with findings as follows:  
  
CONSTITUTIONAL:  
General Appearance: well-appearing,   
nontoxic, alert, no acute distress  
Communication: understanding at   
normal conversational tones, normal   
voicing, speech intelligible  
  
HEAD/FACE:  
Head: atraumatic, normocephalic, no   
lesions  
Facial Inspection: no lesions,   
healthy skin  
Facial Strength: motor strength   
normal, symmetric strength, symmetric   
movement  
Sinuses: no sinus tenderness  
Salivary Glands: no enlargements of   
parotid glands, no tenderness of   
parotid glands, no masses of parotid   
glands, clear salivary flow on   
palpation from Stensen's ducts, no   
duct stones of Stensen's duct, no   
enlargement of submandibular glands,   
no tenderness of submandibular   
glands, no masses of submandibular   
glands, clear salivary flow from   
Colbert's ducts, no stones of   
Cordelia's ducts  
Temporomandibular Joint: no crepitus   
with motion, no tenderness on   
palpation, no trismus, motion   
symmetric  
  
EYES:  
Pupils: PERRLA, extra-ocular   
movements intact, no nystagmus,   
sclera white, no redness of eyes, no   
watering of eyes  
  
EARS:  
Bilateral External Ears: no pits, no   
tags  
Right External Ear: normally formed,   
no lesions, no mastoid tenderness  
Left External Ear: normally formed,   
no lesions, no mastoid tenderness  
Right External Auditory Canal:   
normal, healthy skin, no obstructing   
cerumen, no discharge  
Left External Auditory Canal: normal,   
healthy skin, no obstructing cerumen,   
no discharge  
Right Tympanic Membrane: normal   
landmarks, translucent, mobile to   
pneumatic otoscopy, no perforation  
Left Tympanic Membrane: normal   
landmarks, translucent, mobile to   
pneumatic otoscopy, no perforation  
Hearing: intact to spoken voice  
  
NOSE:  
Nasal Skin: no lesions, no   
lacerations, no scars  
Nasal Dorsum: symmetric with no   
visible or palpable deformities  
Nasal Tip: normal symmetric nasal   
tip, normal nasal valves  
Nasal Mucosa: pale, boggy  
Septum: deviated left, no exposed   
vessels, no bleeding, no septal   
granuloma  
Turbinates: normal size and   
conformation  
Nasopharynx: normal  
  
ORAL CAVITY/MOUTH:  
Lips, teeth, gums: normal lips,   
normal gums, dentition intact, no   
dental pain on palpation  
Oral Mucosa: normal, moist, no   
lesions  
Palate: normal hard palate, normal   
soft palate, symmetric palatal   
elevation  
Floor of Mouth: normal floor of mouth  
Tongue: normal tongue, no lesions, no   
edema, no masses, normal mucosa,   
mobile  
Tonsils: normal tonsils, symmetric,   
no lesions  
Posterior pharynx: normal  
  
HYPOPHARYNX/LARYNX:  
Hypopharynx: normal hypopharynx,   
normal tongue base, normal pyriform   
sinus, normal vallecula  
Larynx: unable to tolerate due to   
gag, see endoscopy note, normal   
epiglottis, normal false vocal cords,   
normal true vocal cords, normal   
glottic mobility, no arytenoid edema,   
no post-cricoid edema, no subglottic   
stenosis  
  
NECK:  
Neck: no masses, trachea midline,   
normal range of motion, no cysts or   
pits, no tenderness to palpation  
Thyroid: normal thyroid, no   
enlargement, no tenderness, no   
nodules  
  
LYMPH NODES:  
Cervical: no palpable lymph node   
enlargement  
  
RESPIRATORY:  
Inspection/Auscultation: good air   
movement, chest expands   
symmetrically, normal breath sounds,   
no wheezing, no stridor  
  
CARDIOVASCULAR SYSTEM:  
Auscultation: regular rate and   
rhythm, carotid pulse normal, no   
carotid thrills, no carotid bruits   
Observation/Palpation of Peripheral   
Vascular System: no varicosities, no   
cyanosis, no edema  
  
SKIN:  
General Appearance: no lesions, warm   
and dry, normal turgor, no bruising  
  
NEUROLOGICAL SYSTEM:  
Orientation: oriented to time,   
oriented to place, oriented to person  
Cranial Nerves: Cranial Nerves II-XII   
intact, normal facial movement, fine   
resting tremor  
  
PSYCHIATRIC:  
Mood and affect: normal mood, normal   
affect  
  
FIBEROPTIC NASOPHARYNOGLARYNGOSCOPY   
NOTE ([id number])  
  
PROCEDURE PERFORMED BY: Sabino Mahoney MD, MD  
  
PROCEDURE DATE: 2025  
  
With the patient and/or caregiver's   
consent, the patient is positioned in   
the exam chair. The nasal cavity is   
then prepared with local   
anesthetic/decongestant of 4%   
Lidocaine and 0.05% oxymetazoline.  
  
Using a flexible endoscope the nasal   
cavity beginning on the right side is   
entered. There is normal moist nasal   
septal mucosa. The nasal septum is   
deviated left. The inferior turbinate   
is normal. The middle turbinate is   
normal. The ethmoid and frontal nasal   
recesses are intact and patent. The   
sphenoid sinus ostea is intact and   
patent. The maxillary sinus ostia is   
intact and patent  
  
The endoscope is then withdrawn and   
the left side is entered. The   
inferior turbinate is normal. The   
middle turbinate is normal. The   
ethmoid and frontal nasal recesses   
are intact and patent. The sphenoid   
sinus ostea is intact and patent. The   
maxillary sinus ostia is intact and   
patent  
  
Examination of nasopharynx shows   
normal adenoid and intact and patent   
eustachian tube orifices.   
Velopharyngeal closure is intact.  
  
Examination of the pharynx reveals   
the lateral and posterior pharyngeal   
wall mucosa is normal moist. Tonsils   
are normal. The tongue base is   
normal. Examination of the   
hypopharynx, vallecula, and pyriform   
sinus reveals normal. The epiglottis   
is normal. Examination of the vocal   
folds reveals normal mucosal and   
mobility bilaterally. The arytenoid   
and post-cricoid mucosa is normal.   
The visualized portions of the   
subglottis is normal.  
  
This completed the procedure with the   
patient having tolerated the   
procedure well.  
  
Assessment and Plan:  
  
I see no sinus drainage or salivary   
pooling to explain his post-nasal   
sensation. His GERD history is note,   
but I do not see typical laryngeal   
edema for this cause of his   
complaint. He coughs with feeling of   
clearance after endoscopy suggesting   
this may be of pulmonary origin. He   
does report history of PE and is on   
anticoagulation therapy for this,   
which may result in persistent cough   
and CT lung may be appropriate for   
continuing symptoms. We have also   
discussed that with his motor tremor   
and gait disturbance, swallowing   
deficits due to possible underlying   
neurologic disease should be   
considered as these would be common   
feature of Parkinson's disease.   
Recent CXR did show some patchy   
infiltration of the right lower lobe   
although the report is read as clear,   
and he was treated for presumed   
bronchitis. This has failed to   
improve his symptoms, but may warrant   
revisiting.  
  
1. Chronic cough CT Chest Thorax With   
Contrast  
  
2. Oropharyngeal dysphagia Ambulatory   
Ref to [facility] (PT/OT/ST)  
  
3. Tremor  
  
  
Return in about 1 month (around   
2025).  
  
The patient and/or caregiver is to   
notify the office if no improvement   
or worsening of symptoms is noted   
prior to the scheduled follow-up for   
sooner evaluation. The patient and/or   
caregiver is able to state an   
understanding of these   
recommendations and is agreeable to   
the treatment plan.  
  
--Sabino Mahoney MD on 2025   
at 8:33 AM  
An electronic signature was used to   
authenticate this note.  
Electronically signed by Sabino Mahoney MD at 2025 8:34 AM EST  
Formatting of this note might be   
different from the original.  
Review of Systems  
Constitutional: Negative.  
HENT: Positive for congestion,   
hearing loss, postnasal drip,   
tinnitus and trouble swallowing.  
Eyes: Positive for visual   
disturbance.  
Respiratory: Positive for cough,   
shortness of breath and wheezing.  
Cardiovascular: Negative.  
Gastrointestinal: Negative.  
Endocrine: Positive for cold   
intolerance.  
Genitourinary: Negative.  
Musculoskeletal: Negative.  
Skin: Negative.  
Allergic/Immunologic: Positive for   
environmental allergies.  
Neurological: Positive for   
light-headedness.  
Hematological: Bruises/bleeds easily.  
Psychiatric/Behavioral: Negative.  
  
Electronically signed by Angelita Laws MA at 2025 8:34 AM   
EST  
documented in this encounter            [facility]  
   
                                        2024 Note     Subjective  
Patient ID: Mihaela Heaton is a 79   
y.o. male.  
Patient started coughing more than   
normal in the last  few weeks  feels   
like has  
drainage in back of his throat - went   
to the ER on the  - put on   
steroid and  
cough medication and inhaler. Took   
those at the time- says he still has   
some  
drainage and shortness of breath  
Says he has history of lung cancer -   
history of left upper lobectomy due   
to  
cancer - and COPD in the left  
Has chronic rhinitis does use an   
over-the-counter nasal spray but not   
certain  
which  
The following portions of the   
patient's history were reviewed and   
updated as  
appropriate: allergies, current   
medications, past family history,   
past medical  
history, past social history, past   
surgical history, and problem list.  
Review of Systems  
Constitutional: Negative for chills   
and fever.  
HENT: Positive for congestion and   
rhinorrhea.  
Respiratory: Positive for cough.  
Cardiovascular: Negative for chest   
pain.  
Gastrointestinal: Negative for   
diarrhea, nausea and vomiting.  
Objective  
Physical Exam  
Vitals reviewed.  
Constitutional:  
General: He is awake.  
Appearance: Normal appearance. He is   
well-developed.  
HENT:  
Head: Normocephalic and atraumatic.  
Right Ear: Tympanic membrane and ear   
canal normal.  
Left Ear: Tympanic membrane and ear   
canal normal.  
Nose: Nose normal.  
Eyes:  
General: Lids are normal.  
Extraocular Movements: Extraocular   
movements intact.  
Pupils: Pupils are equal, round, and   
reactive to light.  
Cardiovascular:  
Rate and Rhythm: Normal rate and   
regular rhythm.  
Heart sounds: S1 normal and S2   
normal. No murmur heard.  
No friction rub. No gallop.  
Pulmonary:  
Effort: Pulmonary effort is normal.   
No tachypnea.  
Breath sounds: No stridor or   
decreased air movement. Examination   
of the  
right-upper field reveals wheezing.   
Examination of the left-upper field   
reveals  
wheezing. Examination of the   
right-middle field reveals wheezing.   
Examination of  
the left-middle field reveals   
wheezing. Examination of the   
right-lower field  
reveals wheezing. Examination of the   
left-lower field reveals wheezing.   
Wheezing  
present. No rhonchi or rales.  
Neurological:  
Mental Status: He is alert.  
Gait: Gait is intact.  
Psychiatric:  
Behavior: Behavior is cooperative.  
Assessment/Plan:  
Diagnoses and all orders for this   
visit:  
Acute bronchitis, unspecified   
organism  
- azithromycin (ZITHROMAX) 250 MG   
tablet; 2 tablets day one, then 1   
tablet a  
day until gone .  
Gave patient detailed instructions on   
proper use of albuterol inhaler he   
was not  
using appropriately he will start to   
use 1 puff 4 times a day if needed go   
to 2  
puffs if not improving call  
Chronic rhinitis  
Suggested starting use of Flonase   
nasal spray he is going to check to   
see what  
spray he has an try if indicated  
Karolyn Daly M.D.  
For any new medications prescribed   
today, patient was educated about   
indications  
for the medication, how to take the   
medication and potential side effects   
of the  
medications.  
AUTHENTICATED BY KAROLYN DALY, ON 2024 21:31:08      [facility]  
   
                                                    2024 History of   
Present illness Narrative               Formatting of this note might be   
different from the original.  
Images from the original note were   
not included.  
Subjective  
Patient ID: Mihaela Heaton is a 79   
y.o. male.  
  
Patient started coughing more than   
normal in the last  few weeks  feels   
like has drainage in back of his   
throat - went to the ER on the  -   
put on steroid and cough medication   
and inhaler. Took those at the time-   
says he still has some drainage and   
shortness of breath  
  
Says he has history of lung cancer -   
history of left upper lobectomy due   
to cancer - and COPD in the left  
  
Has chronic rhinitis does use an   
over-the-counter nasal spray but not   
certain which  
  
The following portions of the   
patient's history were reviewed and   
updated as appropriate: allergies,   
current medications, past family   
history, past medical history, past   
social history, past surgical   
history, and problem list.  
  
Review of Systems  
Constitutional: Negative for chills   
and fever.  
HENT: Positive for congestion and   
rhinorrhea.  
Respiratory: Positive for cough.  
Cardiovascular: Negative for chest   
pain.  
Gastrointestinal: Negative for   
diarrhea, nausea and vomiting.  
  
  
Objective  
Physical Exam  
Vitals reviewed.  
Constitutional:  
General: He is awake.  
Appearance: Normal appearance. He is   
well-developed.  
HENT:  
Head: Normocephalic and atraumatic.  
Right Ear: Tympanic membrane and ear   
canal normal.  
Left Ear: Tympanic membrane and ear   
canal normal.  
Nose: Nose normal.  
Eyes:  
General: Lids are normal.  
Extraocular Movements: Extraocular   
movements intact.  
Pupils: Pupils are equal, round, and   
reactive to light.  
Cardiovascular:  
Rate and Rhythm: Normal rate and   
regular rhythm.  
Heart sounds: S1 normal and S2   
normal. No murmur heard.  
No friction rub. No gallop.  
Pulmonary:  
Effort: Pulmonary effort is normal.   
No tachypnea.  
Breath sounds: No stridor or   
decreased air movement. Examination   
of the right-upper field reveals   
wheezing. Examination of the   
left-upper field reveals wheezing.   
Examination of the right-middle field   
reveals wheezing. Examination of the   
left-middle field reveals wheezing.   
Examination of the right-lower field   
reveals wheezing. Examination of the   
left-lower field reveals wheezing.   
Wheezing present. No rhonchi or   
rales.  
Neurological:  
Mental Status: He is alert.  
Gait: Gait is intact.  
Psychiatric:  
Behavior: Behavior is cooperative.  
  
  
Assessment/Plan:  
  
Diagnoses and all orders for this   
visit:  
  
Acute bronchitis, unspecified   
organism  
- azithromycin (ZITHROMAX) 250 MG   
tablet; 2 tablets day one, then 1   
tablet a day until gone .  
Gave patient detailed instructions on   
proper use of albuterol inhaler he   
was not using appropriately he will   
start to use 1 puff 4 times a day if   
needed go to 2 puffs if not improving   
call  
  
Chronic rhinitis  
Suggested starting use of Flonase   
nasal spray he is going to check to   
see what spray he has an try if   
indicated  
  
Karolyn Daly M.D.  
  
For any new medications prescribed   
today, patient was educated about   
indications for the medication, how   
to take the medication and potential   
side effects of the medications.  
Electronically signed by Karolyn Daly MD at 2024 9:31   
PM EST  
documented in this encounter            [facility]  
   
                                        2024 Instructions   
  
  
Sierra Serna RN - 2024   
8:45 AM ESTFormatting of this note   
might be different from the original.  
After receiving a Prolia Injection:  
  
Tell all of your health care   
providers that you take this   
medication.  
  
This medication may raise the chance   
of a broken leg.  
  
If treatment with this medication is   
stopped, skipped, or delayed, the   
chance of a broken bone is raised.  
  
This medication can cause low Calcium   
levels in the blood. Take any oral   
calcium and Vitamin D as directed by   
your physician.  
  
Call [facility] for any scheduling   
changes at [phone]  
Electronically signed by Sierra Serna RN at 2024 11:54 AM   
EST  
  
documented in this encounter            [facility]  
   
                                        10- Instructions   
  
  
Manuel Avelar MD -   
10/30/2024 11:57 AM EDTFormatting of   
this note might be different from the   
original.  
Please have labs done as below, this   
week or early next week.  
Hold biotin supplements for hair/nail   
health for 1 week before labs (if   
taking).  
Hold multivitamins/B-complex with   
biotin for ~2-3 days before labs (if   
taking).  
  
24 hour urine collection   
instructions:  
-Collect the jug/hat from lab.   
Collect more than 1 jug if needed.  
-When you first wake up in the   
morning, void the first urine, then   
start collecting urine (starting from   
2nd time you will urinate) throughout   
the day and night till next day in   
the morning; include the first urine   
next day in the morning (which would   
be the last to collect). Document the   
start and end time.  
-Keep the collection in a cool   
place/refrigerate during the   
collection and till you drop it off   
at the lab.  
-When you drop off your urine   
collection at the lab, please have   
your blood drawn after overnight   
sleep and fasting (water is fine),   
before 9-10 am.  
  
Try to achieve your total daily goal   
of calcium intake of 1200 mg through   
diet as much as possible. You may use   
over the counter supplements if diet   
is not enough (calcium citrate will   
be more preferred than calcium   
carbonate since you take   
pantoprazole). Keep in mind that the   
regular food that we eat without   
counting e.g. dairy servings, contain   
about 250 mg of calcium daily, which   
should be included in the 1200 mg.   
Try not to go much beyond the 1200 mg   
a day goal. Try not to take more than   
500 mg of calcium at one time for   
optimal absorption.  
  
You need to take vitamin D3 800-1000   
units daily with the biggest meal of   
the day (over the counter). But   
depending on your level, I may   
consider sending a prescription for a   
higher dose.  
  
Let me know if there is any delay in   
getting Prolia shot. You need to make   
sure you get it every 6 months   
regularly with no delay to avoid risk   
of fractures. The plan ist to repeat   
labs 1-2 weeks before each Prolia   
shot.  
Let me know in case of dental   
procedures planned, and let the   
dentist know you take Prolia.  
Let me know in case of new pain in   
hip/thigh area.  
  
  
Electronically signed by Manuel Avelar MD at   
10/30/2024 11:57 AM EDT  
Electronically signed by Manuel Avelar MD at   
10/30/2024 12:03 PM EDT  
Electronically signed by Manuel Avelar MD at   
10/30/2024 12:10 PM EDT  
Electronically signed by Manuel Avelra MD at   
10/30/2024 12:15 PM EDT  
Electronically signed by Manuel Avelar MD at   
10/30/2024 12:16 PM EDT  
Electronically signed by Manuel Avelar MD at   
10/30/2024 12:17 PM EDT  
  
documented in this encounter            [facility]  
   
                                                    10- History of   
Present illness Narrative               Formatting of this note is different   
from the original.  
Images from the original note were   
not included.  
Reason for visit/chief complaint:   
 patient is maintained on Prolia   
injections previously managed by old   
PCP, stable, just need endocrinology   
assistance for management   
Date: 10/30/2024  
Referring Provider: Eber Bazan*  
Primary Care Provider: Eber Bazan DO  
  
HPI:  
Mr. Heaton is a 79 y.o. male with hx   
of afib, Frye's esophagus,   
COPD/asthma, hypothyroidism (thought   
to be related to amiodarone per   
patient, managed by PCP), glaucoma,   
HLD, HTN, PE (on AC), CAD s/p CABG   
, essential tremor, prostate   
cancer, preDM, osteoporosis, lung   
cancer s/p surgery and XRT ~.  
  
Osteoporosis was diagnosed ?around   
.  
There is personal history of   
fractures.  
Had vertebroplasty/kyphoplasty at   
?L1-L2 in 2016; he recalls falling   
off a chair at that time. ?Says he   
was told he had  9  fractures in his   
back in 2016.  
DXA 2024 VFA: Possible compression   
fracture is seen at the T9 level  
  
He has had pharmacologic therapy for   
osteoporosis/osteopenia.  
Prolia started around ?; has been   
receiving it every 6 months with no   
missed/delayed doses, since that   
time, denies having any new fractures   
since then. Has low back pain knee   
pains which he doesn't think have   
worsened with Prolia. Once in a while   
he may have pain in thigh area, not   
new with Prolia per patient. He has a   
broken tooth, which is left without   
extraction per dentist  as long as   
not bothering him . No other dental   
issues otherwise. He sees the dentist   
every 6 months. Last dose of Prolia   
was mid May 2024; due around   
2024. No tingling/numbness, or   
cramps after the shots. Prolia was   
prescribed by his ?old retired PCP vs   
specialist.  
  
Compared to historical young adult   
height of 6' (with shoes on) he   
thinks he may have lost 0.5  height   
loss.  
There is no parental history of   
fractures or osteoporosis.  
He does have frequent falls (twice   
this year), poor balance, denies fear   
of falling.  
  
Dental issues: no as above  
GERD/esophageal issues: yes, has hx   
of Frye's esophagus, takes   
pantoprazole  
  
Current food sources of calcium   
include a couple of cheese servings a   
day.  
Ca/vit D Supplements: no Ca/vitD/MVI   
supplements  
Current weight bearing exercise   
includes none  
  
Med list includes   
hydrochlorothiazide.  
  
(positives are in bold)  
Contraindications to   
teriparatide/abaloparatide: prior   
XRT, cancer in bone (e.g. prior   
osteosarcoma), Paget's disease of   
bone, hyperparathyroidism.  
Cardiac risk factors: personal   
history of MI, CVA; known coronary   
s/p CABG  or cerebrovascular   
disease; HTN, DM, cigarettes.  
  
(positives are in bold)  
There is no history of prolonged   
course of immobilization,   
hyperthyroidism, nephrolithiasis   
(long time ago), hypercalcemia,   
diabetes mellitus, renal or hepatic   
failure, malabsorption, eating   
disorder, lactose intolerance, organ   
transplantation, bariatric or gastric   
surgery, rheumatoid arthritis. He has   
gastroesophageal reflux disease and   
is unaware of hiatal  
hernia, esophageal diverticulum,   
stricture, web, ring, or achalasia.   
There is no history of deep vein   
thrombosis, pulmonary embolus,   
coronary artery disease, myocardial   
infarction, cerebrovascular accident   
or breast cancer. He does not smoke   
cigarettes, quit ~, smoked a lot   
for ~30 years prior to that. He   
consumes alcohol (~1-2 drinks daily).   
He has no history of prolonged course   
of glucocorticoid, anticonvulsant,   
heparin, thyroid hormone,   
benzodiazepine, aromatase inhibitor,   
GnRH agonist, androgen receptor   
antagonist, lithium, vitamin A.   
pioglitazone, SSRI use.  
  
No biotin/MVI/B-complex.  
  
Review of Systems: as per HPI  
  
Medical History:  
Past Medical History:  
Diagnosis Date  
A-fib (HCC)  
Frye's esophagus  
COPD (chronic obstructive pulmonary   
disease) (HCC)  
Disease of thyroid gland  
Glaucoma  
Hyperlipidemia  
Hypertension  
Osteoporosis   
PE (pulmonary thromboembolism) (HCC)  
  
Surgical History:  
Past Surgical History:  
Procedure Laterality Date  
CABG 2008  
x4  
CARDIAC CATHETERIZATION  
2017  
cardio version  
 and   
LUNG REMOVAL, PARTIAL Left 2000  
upper left lobe- jb  
stent rca   
  
Family History:  
History reviewed. No pertinent family   
history.  
Social History:  
Social History  
  
Socioeconomic History  
Marital status:   
Tobacco Use  
Smoking status: Never  
Passive exposure: Never  
Smokeless tobacco: Never  
Vaping Use  
Vaping status: Never Used  
Substance and Sexual Activity  
Drug use: Never  
  
Allergies:  
Allergies  
Allergen Reactions  
Amiodarone Unknown and Other (See   
Comments)  
Made thyroid levels abnormal  
Atorvastatin Other (See Comments)  
  
Current Medications:  
Current Outpatient Medications  
Medication Sig Dispense Refill  
latanoprost (XALATAN) 0.005 %   
ophthalmic solution 1 (one) drop   
nightly .  
levothyroxine (SYNTHROID, LEVOTHROID)   
50 MCG tablet Take 1 tablet (50 mcg)   
by mouth once daily.  
lisinopriL-hydrochlorothiazide   
(PRINZIDE,ZESTORETIC) 20-25 mg per   
tablet  
metoprolol succinate (TOPROL-XL) 25   
MG 24 hr tablet  
pantoprazole (PROTONIX) 40 MG tablet  
pravastatin (PRAVACHOL) 40 MG tablet  
Prolia 60 mg/mL Syrg  
Trelegy Ellipta 100-62.5-25 mcg DsDv  
Xarelto 20 mg Tab  
  
No current facility-administered   
medications for this visit.  
  
Physical Exam:  
Vitals: /75 (BP Location: Left   
arm)   Pulse 86   Ht 5' 9.69    Wt 85   
kg (187 lb 8 oz)   BMI 27.15 kg/m ,   
Body mass index is 27.15 kg/m .,  
Wt Readings from Last 3 Encounters:  
10/30/24 85 kg (187 lb 8 oz)  
10/01/24 85.7 kg (189 lb)  
24 83.9 kg (185 lb)  
  
General/Constitutional: ,   
well-developed and in no distress  
Neck: no remarkable hump no obvious   
thyroid nodules  
Cardiovascular: no remarkable edema,   
possible trace  
Pulmonary/Chest: effort normal  
Musculoskeletal: nomal range of   
motion, normal muscle mass, no   
spine/thigh tenderness  
Neurological: alert and oriented, no   
focal deficits, there are coarse hand   
tremors L>R, DTRs normal  
Skin: warm  
Psychiatric: appropriate affect  
  
Lab/Imaging Data:  
Lab Results  
Component Value Date  
HCT 47 2024  
  
Lab Results  
Component Value Date  
BUN 30 (H) 2024  
CREATININE 0.93 2024  
  
No results found for:  ALT ,  AST ,   
 GGT ,  ALKPHOS ,  BILITOT   
No results found for:  TSH ,   
 F2ARYFT ,  THYROIDAB   
Lab Results  
Component Value Date  
CLARK 4.4 (L) 2024  
  
No results found for:  HGBA1C   
No results found for:  LDLCALC ,   
 CHOL ,  HDL ,  TRIG ,  CHOLHDL   
No results found for:  MICROALBUR ,   
 [id number]   
No results found for:  CPEPTIDE   
  
DXA 1/15/2024:  
COMPARISON:  
2021  
FINDINGS:  
SPINE L1-L4  
Bone Mineral Density: 1.209  
T-Score -0.1 Z-Score 0.3  
Bone Mineral Density change vs   
baseline: Not reported  
Bone Mineral Density change vs   
previous: Not reported  
LEFT FEMUR -TOTAL  
Bone Mineral Density: 0.676  
T-Score -2.6 Z-Score -1.9  
Bone Mineral Density change vs   
baseline: Not reported  
Bone Mineral Density change vs   
previous: Not reported  
LEFT FEMUR -NECK  
Bone Mineral Density: 0.665  
T-Score -2.7 Z-Score -1.6  
RIGHT FEMUR -TOTAL  
Bone Mineral Density: 0.650  
T-Score -2.8 Z-Score -2.1  
Bone Mineral Density change vs   
baseline: Not reported  
Bone Mineral Density change vs   
previous: Not reported  
RIGHT FEMUR -NECK  
Bone Mineral Density: 0.639  
T-Score -2.9 Z-Score -1.8  
Major Osteoporotic Fracture 14.6  
Hip Fracture 7.4  
Note: If no FRAX score is reported,   
it is because:  
Some T-score for Spine Total or Hip   
Total or Femoral Neck at or below  
-2.5  
Vertebral Deformity Assessment: Exam   
Date - 1/15/2024 1:23 pm  
-------------------------------------  
----------------------------  
Vertebral Level Impression  
-------------------------------------  
----------------------------  
T4 None  
T5 None  
T6 None  
T7 None  
T8 None  
T9 severe biconcavity  
T10 None  
T11 None  
T12 None  
L1 None  
L2 None  
L3 None  
L4 None  
-------------------------------------  
----------------------------  
A spine fracture indicates 5X risk   
for subsequent spine fracture  
and 2X risk for subsequent hip   
fracture.  
This exam was performed at formerly [facility] on a GE   
Lunar  
[facility] Advanced Dexa Unit.  
IMPRESSION:  
DEXA: According to World Health   
Organization criteria,  
classification is osteoporosis.  
Followup recommended in two years or   
sooner as clinically warranted.  
VFA: Possible compression fracture is   
seen at the T9 level  
All images and detailed analysis are   
available on the  Radiology  
PACS.  
MACRO:  
None  
  
2024: iCa 4.4 (low, range >4.5)  
  
Assessment and plan:  
Mr. Heaton is a 79 y.o. male with hx   
of afib, Frye's esophagus,   
COPD/asthma, hypothyroidism (thought   
to be related to amiodarone per   
patient, managed by PCP), glaucoma,   
HLD, HTN, PE (on AC), CAD s/p CABG   
, essential tremor, prostate   
cancer, preDM, osteoporosis, lung   
cancer s/p surgery and XRT ~.  
  
Osteoporosis:  
-With ?hx of fragility fractures. Had   
?L1-2 vertebroplasty, and last VFA   
reported T9 compression in 2024;   
will get new T/L spine x-rays.  
-Risk factors include age, smoking   
(past). Additional/repeat labs as   
below.  
-He has been on Prolia for a long   
time now ?~4 years. Last DXA in   
2024 reported osteoporosis and VFA   
reported T9 compression,   
?mechanism/incidence/onset. Will try   
to obtain DXA images. We don't have   
many other options anyway, except for   
keeping him on Prolia since it's   
probably the strongest among other   
antiresorptives, and we can't add   
Forteo/Tymlos given his XRT hx. I   
ordered Prolia (asked patient to have   
labs first); counseled on side   
effects/precautions, labs before   
shots. iCa was slightly low in 2024   
then normalized, so will keep an eye   
on that.  
-In terms of calcium intake, I   
counseled her on her goal daily Ca   
intake; 1200 mg preferably from diet  
-In terms of vitamin D, will check   
level with labs, if fine otherwise,   
he can take 800-1000 units daily.  
-Next DXA scan will be due in 2026.  
  
Return in about 6 months (around   
2025) for osteop f/u.  
  
Time spent reviewing chart, during   
the encounter, putting orders and   
coordinating care on the encounter   
day is 80 minutes.  
  
Manuel Avelar MD  
Endocrinology  
  
Orders Placed This Encounter  
Procedures  
XR Thoracic Spine 2 Views  
Standing Status: Future  
Standing Expiration Date: 10/30/2025  
Scheduling Instructions:  
Fax script to:  
[facility]- [phone]  
[facility]-[phone]  
[phone]  
[facility] - [phone]  
  
  
Order Specific Question: Reason for   
Exam:  
Answer: osteoporosis, history of   
vertebral fractures, evaluate for   
compression fractures, compare to   
previous studies if present  
Order Specific Question: Release to   
patient  
Answer: Immediate  
XR Lumbar Spine 2-3 Views (Standard)  
Standing Status: Future  
Standing Expiration Date: 10/30/2025  
Scheduling Instructions:  
Fax script to:  
[facility] [phone]  
[facility]-[phone]  
[facility]-[phone]  
[facility] [phone]  
  
  
Order Specific Question: Reason for   
Exam:  
Answer: osteoporosis, history of   
vertebral fractures, evaluate for   
compression fractures, compare to   
previous studies if present  
Order Specific Question: Release to   
patient  
Answer: Immediate  
Comprehensive Metabolic Panel  
Standing Status: Future  
Standing Expiration Date: 10/30/2025  
Order Specific Question: Release to   
patient  
Answer: Immediate  
PTH Intact (Processed at [facility])  
Standing Status: Future  
Standing Expiration Date: 10/30/2025  
Order Specific Question: Release to   
patient  
Answer: Immediate  
Phosphorus  
Standing Status: Future  
Standing Expiration Date: 10/30/2025  
Order Specific Question: Release to   
patient  
Answer: Immediate  
Magnesium Level  
Standing Status: Future  
Standing Expiration Date: 10/30/2025  
Order Specific Question: Release to   
patient  
Answer: Immediate  
Beta Crosslaps (Beta CTX)  
Standing Status: Future  
Standing Expiration Date: 10/30/2025  
Order Specific Question: Release to   
patient  
Answer: Immediate  
Protein Electrophoresis, Blood  
Standing Status: Future  
Standing Expiration Date: 10/30/2025  
Order Specific Question: Release to   
patient  
Answer: Immediate  
Order Specific Question: MARGOTH  
Answer: Send to [facility]  
Immunofixation, Serum  
Standing Status: Future  
Standing Expiration Date: 10/30/2025  
Order Specific Question: Release to   
patient  
Answer: Immediate  
Order Specific Question: MARGOTH  
Answer: Send to [facility]  
Immunoglobulin Free Light   
Chains,Blood  
Standing Status: Future  
Standing Expiration Date: 10/30/2025  
Order Specific Question: Release to   
patient  
Answer: Immediate  
Calcium, Urine, 24 Hour  
Standing Status: Future  
Standing Expiration Date: 10/30/2025  
Order Specific Question: Release to   
patient  
Answer: Immediate  
Creatinine, Urine, 24 Hour  
Standing Status: Future  
Standing Expiration Date: 10/30/2025  
Order Specific Question: Release to   
patient  
Answer: Immediate  
Sodium, Urine, 24 Hour  
Standing Status: Future  
Standing Expiration Date: 10/30/2025  
Order Specific Question: Release to   
patient  
Answer: Immediate  
Vitamin D, Total, 25-OH  
Standing Status: Future  
Standing Expiration Date: 10/30/2025  
Order Specific Question: Release to   
patient  
Answer: Immediate  
Celiac Serology [place] Panel  
Standing Status: Future  
Standing Expiration Date: 10/30/2025  
Order Specific Question: Release to   
patient  
Answer: Immediate  
Calcium, ionized  
Standing Status: Future  
Standing Expiration Date: 10/30/2025  
Order Specific Question: Release to   
patient  
Answer: Immediate  
  
  
Electronically signed by Manuel Avelar MD at   
10/30/2024 12:26 PM EDT  
documented in this encounter            [facility]  
   
                                        10- Note     Reason for visit/chi  
ef complaint:   
 patient is maintained on Prolia   
injections  
previously managed by old PCP,   
stable, just need endocrinology   
assistance for  
management   
Date: 10/30/2024  
Referring Provider: Eber Bazan*  
Primary Care Provider: Eber Bazan DO  
HPI:  
Mr. Heaton is a 79 y.o. male with hx   
of afib, Frye's esophagus,   
COPD/asthma,  
hypothyroidism (thought to be related   
to amiodarone per patient, managed by  
PCP), glaucoma, HLD, HTN, PE (on AC),   
CAD s/p CABG , essential tremor,  
prostate cancer, preDM, osteoporosis,   
lung cancer s/p surgery and XRT   
~.  
Osteoporosis was diagnosed ?around   
.  
There is personal history of   
fractures.  
Had vertebroplasty/kyphoplasty at   
?L1-L2 in 2016; he recalls falling   
off a chair  
at that time. ?Says he was told he   
had  9  fractures in his back in   
2016.  
DXA 2024 VFA: Possible compression   
fracture is seen at the T9 level  
He has had pharmacologic therapy for   
osteoporosis/osteopenia.  
Prolia started around ?2020; has been   
receiving it every 6 months with no  
missed/delayed doses, since that   
time, denies having any new fractures   
since  
then. Has low back pain knee pains   
which he doesn't think have worsened   
with  
Prolia. Once in a while he may have   
pain in thigh area, not new with   
Prolia per  
patient. He has a broken tooth, which   
is left without extraction per   
dentist  as  
long as not bothering him . No other   
dental issues otherwise. He sees the  
dentist every 6 months. Last dose of   
Prolia was mid May 2024; due around  
2024. No tingling/numbness, or   
cramps after the shots. Prolia was  
prescribed by his ?old retired PCP vs   
specialist.  
Compared to historical young adult   
height of 6' (with shoes on) he   
thinks he may  
have lost 0.5  height loss.  
There is no parental history of   
fractures or osteoporosis.  
He does have frequent falls (twice   
this year), poor balance, denies fear   
of  
falling.  
Dental issues: no as above  
GERD/esophageal issues: yes, has hx   
of Frye's esophagus, takes   
pantoprazole  
Current food sources of calcium   
include a couple of cheese servings a   
day.  
Ca/vit D Supplements: no Ca/vitD/MVI   
supplements  
Current weight bearing exercise   
includes none  
Med list includes   
hydrochlorothiazide.  
(positives are in bold)  
Contraindications to   
teriparatide/abaloparatide: prior   
XRT, cancer in bone (e.g.  
prior osteosarcoma), Paget's disease   
of bone, hyperparathyroidism.  
Cardiac risk factors: personal   
history of MI, CVA; known coronary   
s/p CABG   
or cerebrovascular disease; HTN, DM,   
cigarettes.  
(positives are in bold)  
There is no history of prolonged   
course of immobilization,   
hyperthyroidism,  
nephrolithiasis (long time ago),   
hypercalcemia, diabetes mellitus,   
renal or  
hepatic failure, malabsorption,   
eating disorder, lactose intolerance,   
organ  
transplantation, bariatric or gastric   
surgery, rheumatoid arthritis. He has  
gastroesophageal reflux disease and   
is unaware of hiatal  
hernia, esophageal diverticulum,   
stricture, web, ring, or achalasia.   
There is no  
history of deep vein thrombosis,   
pulmonary embolus, coronary artery   
disease,  
myocardial infarction,   
cerebrovascular accident or breast   
cancer. He does not  
smoke cigarettes, quit ~, smoked   
a lot for ~30 years prior to that. He  
consumes alcohol (~1-2 drinks daily).   
He has no history of prolonged course   
of  
glucocorticoid, anticonvulsant,   
heparin, thyroid hormone,   
benzodiazepine,  
aromatase inhibitor, GnRH agonist,   
androgen receptor antagonist,   
lithium,  
vitamin A. pioglitazone, SSRI use.  
No biotin/MVI/B-complex.  
Review of Systems: as per HPI  
Medical History:  
Past Medical History:  
Diagnosis Date  
A-fib (HCC)  
Frye's esophagus  
COPD (chronic obstructive pulmonary   
disease) (HCC)  
Disease of thyroid gland  
Glaucoma  
Hyperlipidemia  
Hypertension  
Osteoporosis   
PE (pulmonary thromboembolism) (HCC)  
Surgical History:  
Past Surgical History:  
Procedure Laterality Date  
CABG 2008  
x4  
CARDIAC CATHETERIZATION  
2017  
cardio version  
 and   
LUNG REMOVAL, PARTIAL Left 2000  
upper left lobe- jb  
stent rca   
Family History:  
History reviewed. No pertinent family   
history.  
Social History:  
Social History  
Socioeconomic History  
Marital status:   
Tobacco Use  
Smoking status: Never  
Passive exposure: Never  
Smokeless tobacco: Never  
Vaping Use  
Vaping status: Never Used  
Substance and Sexual Activity  
Drug use: Never  
Allergies:  
Allergies  
Allergen Reactions  
Amiodarone Unknown and Other (See   
Comments)  
Made thyroid levels abnormal  
Atorvastatin Other (See Comments)  
Current Medications:  
Current Outpatient Medications  
Medication Sig Dispense Refill  
latanoprost (XALATAN) 0.005 %   
ophthalmic solution 1 (one) drop   
nightly .  
levothyroxine (SYNTHROID, LEVOTHROID)   
50 MCG tablet Take 1 tablet (50 mcg)   
by  
mouth once daily.  
lisinopriL-hydrochlorothiazide   
(PRINZIDE,ZESTORETIC) 20-25 mg per   
tablet  
metoprolol succinate (TOPROL-XL) 25   
(more content not included)...          [facility]  
   
                                        10- Note     Assessment & Plan  
(Please note that this note was   
predominantly written using an   
AI-based voice  
recognition software. Efforts were   
made to correct any errors, but   
occasionally  
the software creates inaccuracies.   
Please reach out for any   
clarification.)  
Assessment & Plan  
1. Prostate Cancer.  
Currently managed with surveillance   
by Urology. No immediate changes in  
management from the PCP perspective.   
Offered support.  
2. Coronary Artery Disease.  
He has a history of stent placement   
and is under the care of a   
cardiologist. He  
has been off pravastatin for the past   
2 weeks due to joint and muscle pains   
that  
have only marginally improved off the   
medication. CoQ10 has not reduced it  
either. He is advised to resume   
pravastatin and discuss potential   
alternatives  
with his cardiologist. If his   
appointment with the cardiologist is   
within the  
next month, he can remain off   
pravastatin until then. If it is   
later, he should  
resume pravastatin and discuss   
alternatives during the visit.  
3. Atrial Fibrillation.  
Currently managed with metoprolol 25   
mg daily and Xarelto 20 mg daily. He   
is  
advised to continue these   
medications. He is not in atrial   
fibrillation today  
based on the physical examination.  
4. Asthma-COPD Overlap Syndrome.  
Currently managed with Trelegy. He   
has not required a rescue inhaler,   
indicating  
good control. He is advised to   
continue Trelegy.  
5. Hypothyroidism.  
Thought to have been induced by   
previous amiodarone use. Managed with  
levothyroxine 50 mcg daily. His last   
thyroid test was normal in January.   
He is  
advised to continue levothyroxine.  
6. Hypertension.  
His blood pressure is   
well-controlled, possibly too much,   
at 103/68. Amlodipine  
5 mg will be discontinued. He will   
continue   
lisinopril-hydrochlorothiazide 20-25  
mg and monitor his blood pressure at   
home. If his blood pressure   
consistently  
exceeds 140/90, he should notify the   
office.  
7. Osteoporosis.  
Managed with Prolia injections. He is   
due for his next injection in   
November. A  
referral has been made to an   
endocrinologist for potential   
administration of  
Prolia injections.  
8. Frye's Esophagus.  
He has a history of Frye's   
esophagus but his last scope showed   
no signs of it  
per patient, records to be acquired   
still. He is currently on   
pantoprazole. He  
is advised to continue pantoprazole   
for now and consider discontinuation   
after  
his cardiology appointment. If he   
experiences withdrawal symptoms, he   
can use  
over-the-counter Pepcid, peppermint   
tea, or turmeric.  
9. Tremor.  
He has a persistent tremor that is   
currently not being treated with   
medication.  
If the tremor becomes more disruptive   
or if his blood pressure has room   
since  
amlodipine is being discontinued,   
propranolol may be considered as a   
replacement  
for metoprolol.  
10. History of Pulmonary Embolism.  
Managed with Xarelto 20 mg daily. He   
is advised to continue Xarelto.  
Follow-up  
Return in 3 months for a Medicare   
wellness visit.  
Diagnoses and all orders for this   
visit:  
Paroxysmal atrial fibrillation (HCC)  
Asthma-COPD overlap syndrome (HCC)  
Prostate cancer managed with active   
surveillance (HCC)  
Coronary artery disease involving   
native coronary artery of native   
heart without  
angina pectoris  
Primary hypertension  
Familial hypercholesterolemia  
Acquired hypothyroidism  
Osteoporosis, unspecified   
osteoporosis type, unspecified   
pathological fracture  
presence  
- Ambulatory referral to   
Endocrinology; Future  
Essential tremor  
Polypharmacy  
History of Frye's esophagus  
History of pulmonary embolism  
For any new medications prescribed   
today, patient was educated about   
indications  
for the medication, how to take the   
medication, and potential side   
effects of  
the medication.  
My ongoing relationship with Mihaela Heaton requires continued   
responsibility  
and cognitive effort of being the   
focal point for all services related   
to  
chronic condition(s).  
*Total encounter time today was 60   
minutes. This time includes review of   
past  
tests/notes, obtaining patient   
history, performing a medically   
necessary exam,  
counseling the patient, ordering   
tests/procedures/medications,   
documentation,  
and care coordination.  
Return in about 3 months (around   
2025) for Annual Medicare   
Wellness Exam  
after .  
Eber Bazan DO, MS  
Family Medicine, Osteopathic   
Manipulative Medicine, and Hospital   
Medicine  
[facility]  
10/1/2024  
Subjective  
Chief Complaint  
Patient presents with  
[facility]  
After discussing the use of ambient   
listening and audio recording in   
generating  
medical documentation, the patient   
verbally consented to use of this   
technology  
for today's visit.  
History of Present Illness  
The patient is a 79-year-old male who   
is here to establish care as a new   
patient  
today. He was previously seeing Dr. Jones.  
He reports feeling well overall but   
has experienced joint pain while on   
statins,  
leading him to discontinue   
pravastatin two (more content not   
included)...                            [facility]  
   
                                                    07- History of   
Present illness Narrative               Formatting of this note might be   
different from the original.  
Subjective  
Patient ID: Mihaela Heaton is a 78   
y.o. male who presents for Med   
Management (ER follow up for neck   
pain, pharyngitis, look at spots on   
leg).  
  
HPI  
BCC and SK's- [facility] took off   
4 lesions on legs. 3 were BCC. In   
past on the back. It has been 2   
weeks. Some are red today. Tender.  
  
Atrial fibrillation - no chest pain   
or racing even when in afib. Was on   
Amiodarone after cardioversioin. Did   
not tolerate that well so off of it   
now. No edema or chest congestion. No   
longer needs Lasix 40. ECHO reported   
OK. Dr Raphael did a normal stress   
test before surgery in May 2022. Dr. Escobedo now.  
  
Cervical spondylosis - in ER on  with a lot of pain at the base of   
the skull. Was found to have   
degenerative changes on CT. Felt like   
a stiff neck. Given Morphine IV. Now   
doing OK. Muscle relaxer made him   
dizzy. And now not needed. Did not   
need Norco. Injections a few years   
ago. Dr Landers.  
  
Chronic Rhinitis - Was put on a nasal   
spray also for nasal congestion and   
got lightheaded. Astepro as needed.   
Was in ER in  with a bad ST.   
Negative for Strep and Mono. Getting   
better but still hoarse. A lot of   
drainage. Pills are sticking. Will   
get on Astepro daily.  
  
GERD and Dyphagia - Frey esophagus   
not seen on last scope. Still on   
Pantoprazole. Felt to be spasm but   
amlodipine did not help. OK recently   
. No melena or hematochezia.. Pills   
sometimes a problem  
  
Carotid Bruit per Dr Raphael.   
Doppler in 2022. MIld on left   
but when in ER he had CTA showing   
severe right carotid and vertebral   
stenosis. Will see Dr Escobedo for this.   
Copy of CTA report given to him.  
  
COPD, severe and emphysema - Trelegy   
seems to work. Had daytime oxygen   
levels good but on 2018 had   
nocturnal down to 87 for 8 minutes.   
So on hs oxygen at hs off an on. He   
checks at home and stays in 90s. Dr Salmeron's office a few months ago.   
Will need to see a new doctor there.  
  
Coronary artery disease - No Chest   
pain except as above with esophagus,   
palpitations, numbness, weakness,   
claudications, or double vision/ loss   
of vision. He is active with stairs.   
Dr Escobedo.  
  
Elevated PSA up again to 7.1 from 5.4   
from 4.38 from 3.73. No blood.   
Percent free 13%. Risk is 41%. Has   
not been to urology with this. Will   
refer to urology when ready. But a   
friend found Pomegranate Juice   
dropped it so he tried that and does   
not seem to help. So will refer to Dr Ray.  
  
Familial hyperlipidemia - Med works   
well without side effects. Good last   
time.  
  
Glaucoma - on drops and monitoring   
with Dr Castro at [facility].. Had   
laser on both in the last month.  
  
Osteoporosis and History of   
compression fracture of spine seems   
to be pretty decent. Occasional   
Tylenol but CBD oil seems to do   
better. Prolia every 6 months. Bone   
density in January was better.  
  
History of lung cancer () - no   
blood. Has a cough and hoarseness   
from the PND. Dr Luna had stopped   
his lisinopril to see if that makes a   
difference but it did not. Had   
allergy testing. Allergy to   
cockroaches. Stopped smoking more   
than 15 years ago. Last CT was in   
18. Dr Salmeron. EDDIE  
  
History of pulmonary embolus (PE) -   
on Xarelto for life. No new dyspnea   
or pain. Also for Afib  
  
Hypertension - Med works well without   
side effects. Would like to get off   
of amlodipine. Will take if BP is   
over 130.  
  
Prediabetes - No polyphagia,   
polydipsia, polyuria.. Some non   
healing sores. A1C 6.1 in January.   
FBS this time 106.  
  
Lumbar facet arthropathy Tylenol   
helps this but not needed recently as   
CBD worked well but ran out so off of   
that  
  
Tremor - finds he is shaking a lot   
when eating. Bothering eating and   
writing. No meds. Discussed   
medication options and tried   
Primidone helped only a little and   
made him dizzy.  
  
Hypothyroidism on Levothyroxine 50.   
TSH is over 100 but FT4 is 1.10. T3   
came back normal as well so this is   
suggestive of TSH production   
independent of the thyroid level.  
  
Scope 18  
AAA screen 20  
LW and DPA yes wife  
Pneumovax UTD  
  
Review of Systems  
  
Objective  
/78 (BP Location: Left arm,   
Patient Position: Sitting)   Pulse 76   
  Wt 85.7 kg (189 lb)   SpO2 97%     
BMI 26.18 kg/m  
  
Physical Exam  
Vitals reviewed.  
Constitutional:  
General: He is not in acute distress.  
Appearance: Normal appearance.  
HENT:  
Head: Normocephalic.  
Right Ear: Tympanic membrane, ear   
canal and external ear normal.  
Left Ear: Tympanic membrane, ear   
canal and external ear normal.  
Nose: Nose normal.  
Mouth/Throat:  
Mouth: Mucous membranes are moist.  
Pharynx: Oropharynx is clear.  
Comments: Gravely voice  
Eyes:  
Extraocular Movements: Extraocular   
movements intact.  
Conjunctiva/sclera: Conjunctivae   
normal.  
Pupils: Pupils are equal, round, and   
reactive to light.  
Neck:  
Vascular: No carotid bruit.  
Cardiovascular:  
Rate and Rhythm: Normal rate and   
regular rhythm.  
Pulses: Normal pulses.  
Heart sounds: Normal heart sounds. No   
murmur heard.  
Pulmonary:  
Effort: Pulmonary effort is normal.   
No respiratory distress.  
Breath sounds: Normal breath sounds.  
Abdominal:  
General: Abdomen is flat. Bowel   
sounds are normal. There is no   
distension.  
Palpations: Abdomen is soft. There is   
no mass.  
Tenderness: There is no abdominal   
tenderness.  
Musculoskeletal:  
Cervical back: Normal range of motion   
and neck supple. No tenderness.  
Lymphadenopathy:  
Cervical: No cervical adenopathy.  
Skin:  
General: Skin is warm and dry.  
Findings: Lesion (2 eschars on left   
leg and one on right from excisions.   
A little pink around them but seem to   
be healing well.) present. No rash.  
Neurological:  
General: No focal deficit present.  
Mental Status: He is alert and   
oriented to person, place, and time.  
Psychiatric:  
Mood and Affect: Mood normal.  
Thought Content: Thought content   
normal.  
Judgment: Judgment normal.  
  
Assessment/Plan  
Diagnoses and all orders for this   
visit:  
Actinic skin damage  
Elevated PSA  
- Follow Up In Primary Care -   
Established  
- Referral to Urology; Future  
Paroxysmal atrial fibrillation   
(Multi)  
- CBC; Future  
- Comprehensive Metabolic Panel;   
Future  
- Follow Up In Primary Care -   
Established; Future  
Atherosclerosis of native coronary   
artery of native heart without angina   
pectoris  
Basal cell carcinoma (BCC) of skin of   
lower extremity including hip,   
unspecified laterality  
Stenosis of right carotid artery  
Centrilobular emphysema (Multi)  
Cervical spondylosis  
Chronic rhinitis  
COPD, severe (Multi)  
Pharyngoesophageal dysphagia  
Essential tremor  
Familial hyperlipidemia  
- Lipid Panel; Future  
Gastroesophageal reflux disease   
without esophagitis  
Glaucoma of both eyes, unspecified   
glaucoma type  
History of pulmonary embolism  
Primary hypertension  
Acquired hypothyroidism  
- TSH with reflex to Free T4 if   
abnormal; Future  
- T3; Future  
Hypoxemia  
Lentigines  
Lumbar facet arthropathy  
Localized osteoporosis without   
current pathological fracture  
Prediabetes  
- Hemoglobin A1C; Future  
Posterior tibial tendinitis of right   
lower extremity  
Other orders  
- amLODIPine (Norvasc) 5 mg tablet;   
Take 1 tablet (5 mg) by mouth once   
daily as needed (high BP).  
  
  
Electronically signed by Jake Jones MD at 07/10/2024 9:57 AM EDT  
documented in this encounter            [facility]  
Work Phone:   
[phone]  
   
                                        07- Instructions   
  
  
Jake Jones MD - 07/10/2024 9:00   
AM EDTFormatting of this note might   
be different from the original.  
Call if you notice more redness of   
the leg lesions. And I will put you   
on antibiotic.  
Electronically signed by Jake Jones MD at 07/10/2024 9:55 AM EDT  
  
documented in this encounter            [facility]  
Work Phone:   
[phone]  
   
                                                    2024 History of   
Present illness Narrative               Formatting of this note might be   
different from the original.  
Subjective  
Patient ID: Mihaela Heaton is a 78   
y.o. male who presents for Follow up   
ER.  
  
Here to follow up ER visit from   
24. Report reviewed. Strep   
negative  
States has has ST for about a week.  
Chronic sinus drainage  always .  
No fever, no cough.  
Feeling OK, but tired. Not sleeping   
well due to ST.  
ST seemed to improve with the dose of   
steroid in the ER, but worsened again   
after about 1 day.  
  
  
  
Review of Systems  
HENT: Positive for sore throat.  
Respiratory: Positive for shortness   
of breath ( As always ).  
  
Objective  
/68 (BP Location: Left arm)     
Pulse 72   Resp 16   Wt 87.2 kg (192   
lb 3.9 oz)   BMI 26.62 kg/m  
  
Physical Exam  
Constitutional:  
Appearance: Normal appearance.  
HENT:  
Head: Normocephalic.  
Nose: Nose normal.  
Mouth/Throat:  
Pharynx: Oropharynx is clear.   
Posterior oropharyngeal erythema   
present.  
Eyes:  
Conjunctiva/sclera: Conjunctivae   
normal.  
Cardiovascular:  
Rate and Rhythm: Normal rate and   
regular rhythm.  
Heart sounds: Normal heart sounds.  
Pulmonary:  
Effort: Pulmonary effort is normal.  
Breath sounds: Normal breath sounds.  
Abdominal:  
General: Bowel sounds are normal.  
Palpations: Abdomen is soft.  
Musculoskeletal:  
Cervical back: Neck supple.   
Tenderness (Tender lower neck   
bilaterally near larynx above   
collarbone) present.  
Skin:  
General: Skin is warm and dry.  
Neurological:  
Mental Status: He is alert.  
Psychiatric:  
Mood and Affect: Mood normal.  
  
Assessment/Plan  
Diagnoses and all orders for this   
visit:  
Other fatigue (Primary)  
- CBC and Auto Differential; Future  
- Mononucleosis screen; Future  
Pharyngitis, unspecified etiology  
- CBC and Auto Differential; Future  
- Mononucleosis screen; Future  
- azithromycin (Zithromax) 250 mg   
tablet; Take 2 tablets (500 mg) by   
mouth once daily for 1 day, THEN 1   
tablet (250 mg) once daily for 4   
days. Take 2 tabs (500 mg) by mouth   
today, than 1 daily for 4 days..  
  
  
ER followup Pharyngitis, not strep  
Suspect viral etiology, but not   
resolving. Will check for Mono  
Rx sent for Zpack if not resolving   
and Mono Negative, although given the   
option to watch for 2-3 days for   
resolution before antibiotic.  
Follow up symptoms not improving and   
resolving completely or   
worsens/changes/concerns.  
Electronically signed by FELIPA Parker at 2024 3:35 PM   
EDT  
documented in this encounter            [facility]  
Work Phone:   
[phone]  
   
                                        2024 Instructions   
  
  
FELIPA Parker - 2024   
3:00 PM EDTFormatting of this note   
might be different from the original.  
Follow up symptoms not resolving or   
worsens/changes/concerns  
Electronically signed by FELIPA Parker at 2024 3:26 PM   
EDT  
  
documented in this encounter            [facility]  
Work Phone:   
[phone]  
   
                                                    2024 History of   
Present illness Narrative               Formatting of this note might be   
different from the original.  
Subjective  
Patient ID: Mihaela Heaton is a 78   
y.o. male who presents for Follow-up   
(Check spots on leg).  
  
HPI  
Has multiple spots on the legs that   
keep coming up  
Usually were small  
I took some off of his back  
The big one has been there for   
several years on the back of left   
calf.  
Had a scab and was careful not to rub   
it off  
Now off and it is weepy and will not   
scab over  
The ones on lower ankle was there for   
a few months and scaly but not weepy  
Has one on the right shin  
Those are tender  
Also has scaly lesions on the back  
Was in the sun a lot for 5 years  
  
Also reviewed the thyroid which had   
high TSH and FT4 but normal T3 so   
will keep at same dose of the   
medication. The TSH is better at 46   
instead of 146.  
  
Review of Systems  
  
Objective  
/78 (BP Location: Right arm,   
Patient Position: Sitting)   Pulse 73   
  Wt 86.6 kg (191 lb)   SpO2 97%     
BMI 26.45 kg/m  
  
Physical Exam  
Constitutional:  
Appearance: Normal appearance.  
Skin:  
General: Skin is warm and dry.  
Findings: Lesion (Hfas multiple   
lesions as noted above on the back   
and legs ranging in size from 5 mm to   
15 mm. All are scally and pink except   
the one on the back of the calf is   
raised and weepy) present.  
Neurological:  
Mental Status: He is alert.  
  
Assessment/Plan  
Diagnoses and all orders for this   
visit:  
Skin lesions  
- Referral to Dermatology  
Age related osteoporosis, unspecified   
pathological fracture presence  
  
  
Electronically signed by Jake Jones MD at 2024 8:24 AM EDT  
documented in this encounter            [facility]  
Work Phone:   
[phone]  
   
                                                    01- History of   
Present illness Narrative               Formatting of this note is different   
from the original.  
Subjective  
Reason for Visit: Mihaela Heaton is   
an 78 y.o. male here for a Medicare   
Wellness visit.  
Past Medical, Surgical, and Family   
History reviewed and updated in   
chart.  
  
Reviewed all medications by   
prescribing practitioner or clinical   
pharmacist (such as prescriptions,   
OTCs, herbal therapies and   
supplements) and documented in the   
medical record.  
  
HPI  
BCC - Dr Carreno - no recent visit or   
worrisome lesions. Lesions on back   
removed by me  
  
Atrial fibrillation - no chest pain   
or racing even when in afib. Was on   
Amiodarone after cardioversioin. Did   
not tolerate that well so off of it   
now. No edema or chest congestion. No   
longer needs Lasix 40. ECHO reported   
OK. Dr Raphael did a normal stress   
test before surgery in May 2022.  
  
Chronic Rhinitis - Was put on a nasal   
spray also for nasal congestion and   
got lightheaded. Had a fall with   
facial contusion Put on nasal   
Azelastine and Allegra and treated   
sinusitis with antibiotics. That has   
improved. So on no meds  
  
GERD and Dyphagia - Frye esophagus   
not seen on last scope. Still on   
Pantoprazole. Felt to be spasm but   
amlodipine did not help after awhile.   
OK recently . No melena or   
hematochezia.. Pills sometimes a   
problem  
  
Carotid Bruit per Dr Raphael.   
Doppler in 2022. MIld on left  
  
Cervical spondylosis - has been good   
after injection per Dr Landers several   
years ago.  
  
COPD, severe and emphysema - Trelegy   
seems to work. Had daytime oxygen   
levels good but on 2018 had   
nocturnal down to 87 for 8 minutes.   
So on hs oxygen at hs. He checks at   
home and stays in 90s. Dr Salmeron's   
office a few weeks ago.  
  
Coronary artery disease - No Chest   
pain except as above with esophagus,   
palpitations, numbness, weakness,   
claudications, or double vision/ loss   
of vision. He is active with stairs.   
Dr Escobedo.  
  
Elevated PSA up again to 5.4 from   
4.38 from 3.73. No blood. Percent   
free 13% .. Risk is 41%. Has not been   
to urology with this. Will refer to   
urology when ready. But a friend   
found Pomegranate Juice dropped it so   
he is trying that.  
  
Familial hyperlipidemia - Med works   
well without side effects. Good this   
time.  
  
Glaucoma - on drops and monitoring   
with Dr Castro at [facility] in October  
  
Osteoporosis and History of   
compression fracture of spine seems   
to be pretty decent. Occasional   
Tylenol but CBD oil seems to do   
better.. Prolia every 6 months. Bone   
density in 2021 so will recheck  
  
History of lung cancer () - no   
blood. Has a cough from the PND and   
mucus better with Allegra. Dr Luna   
had stopped his lisinopril to see if   
that makes a difference but it did   
not. Had allergy testing. Allergy to   
cockroaches. Stopped smoking more   
than 15 years ago. Last CT was in   
18. Dr Salmeron. EDDIE  
  
History of pulmonary embolus (PE) -   
on Xarelto for life. No new dyspnea   
or pain. Also for Afib  
  
Hypertension - Med works well without   
side effects. Looks good off of   
amlodipine. Will take if BP is over   
130.  
  
Prediabetes - No polyphagia,   
polydipsia, polyuria, or non healing   
sores. A1C 6.1 in January. FBS this   
time 100.  
  
Lumbar facet arthropathy Tylenol   
helps this but not needed recently as   
CBD worked well but ran out so off of   
that.  
  
Seborrheic keratosis no irritated   
skin lesions. But had BCC removed   
last year  
  
Tremor - finds he is shaking a lot   
when eating. Bothering eating and   
writing. No meds. Discussed   
medication options and tried   
Primidone helped only a little and   
made him dizzy.  
  
Hypothyroidism on Levothyroxine 50.   
TSH is over 100 but FT4 is 1.10. So   
will check the T3.  
  
Scope 18  
AAA screen 20  
LW and DPA yes wife  
Pneumovax UTD  
  
Patient Care Team:  
Jake Jones MD as PCP - General  
Jake Jones MD as PCP - [employer] PCP  
  
Review of Systems  
  
Objective  
Vitals:  
/80 (BP Location: Left arm,   
Patient Position: Sitting)   Pulse 64   
  Ht 1.81 m (5' 11.25 )   Wt 84.4 kg   
(186 lb)   SpO2 96%   BMI 25.76 kg/m  
  
Physical Exam  
Vitals reviewed.  
Constitutional:  
General: He is not in acute distress.  
Appearance: Normal appearance.  
Comments: Chair to table with no   
unsteadiness.  
HENT:  
Head: Normocephalic.  
Right Ear: Tympanic membrane normal.  
Left Ear: Tympanic membrane normal.  
Nose: Nose normal.  
Mouth/Throat:  
Pharynx: Oropharynx is clear.  
Eyes:  
Extraocular Movements: Extraocular   
movements intact.  
Conjunctiva/sclera: Conjunctivae   
normal.  
Pupils: Pupils are equal, round, and   
reactive to light.  
Neck:  
Vascular: No carotid bruit.  
Cardiovascular:  
Rate and Rhythm: Normal rate and   
regular rhythm.  
Pulses: Normal pulses.  
Heart sounds: Normal heart sounds. No   
murmur heard.  
Pulmonary:  
Effort: Pulmonary effort is normal.   
No respiratory distress.  
Breath sounds: Normal breath sounds.  
Abdominal:  
General: Abdomen is flat. Bowel   
sounds are normal. There is no   
distension.  
Palpations: Abdomen is soft. There is   
no mass.  
Tenderness: There is no abdominal   
tenderness.  
Musculoskeletal:  
Cervical back: Normal range of motion   
and neck supple. No tenderness.  
Lymphadenopathy:  
Cervical: No cervical adenopathy.  
Skin:  
General: Skin is warm and dry.  
Findings: No rash.  
Neurological:  
General: No focal deficit present.  
Mental Status: He is alert and   
oriented to person, place, and time.  
Comments: Fine tremor of hands  
Psychiatric:  
Mood and Affect: Mood normal.  
Thought Content: Thought content   
normal.  
Judgment: Judgment normal.  
  
Assessment/Plan  
Problem List Items Addressed This   
Visit  
  
Atherosclerosis of native coronary   
artery of native heart with angina   
pectoris (CMS/HCC)  
Atrial fibrillation (CMS/HCC)  
Relevant Orders  
Comprehensive Metabolic Panel  
CBC  
T3  
Basal cell carcinoma  
Centrilobular emphysema (CMS/HCC)  
Cervical spondylosis  
Chronic rhinitis  
COPD, severe (CMS/HCC)  
Dysphagia  
Elevated PSA  
Relevant Orders  
Prostate Specific Antigen  
Follow Up In Primary Care -   
Established  
Essential tremor  
Familial hyperlipidemia  
GERD (gastroesophageal reflux   
disease)  
Glaucoma  
History of pulmonary embolism  
Hypertension  
Hypothyroidism  
Hypoxemia  
Osteoporosis  
Relevant Orders  
XR DEXA bone density  
Prediabetes  
Seborrheic keratosis  
  
Other Visit Diagnoses  
  
Routine general medical examination   
at health care facility - Primary  
  
  
Since he is not inclined to take meds   
for afib, do not see a need to do   
Holter at this time. Appears to be in   
sinus today  
  
Patient was identified as a fall   
risk. Risk prevention instructions   
provided. Some meds make him dizzy so   
he has to watch when he stands up and   
turns around  
Electronically signed by Jake Jones MD at 01/10/2024 10:23 AM EST  
documented in this encounter            [facility]  
Work Phone:   
[phone]  
   
                                        01- Instructions   
  
  
Jake Jones MD - 01/10/2024 9:30   
AM ESTFormatting of this note is   
different from the original.  
  
  
Ways to Help Prevent Falls at Home  
  
Quick Tips  
? Ask for help if you need it. Most   
people want to help!  
? Get up slowly after sitting or   
laying down  
? Wear a medical alert device or keep   
cell phone in your pocket  
? Use night lights, especially areas   
near a bathroom  
? Keep the items you use often within   
reach on a small stool or end table  
? Use an assistive device such as   
walker or cane, as directed by   
provider/physical therapy  
? Use a non-slip mat and grab bars in   
your bathroom. Look for home health   
sections for best options  
  
Other Areas to Focus On  
? Exercise and nutrition: Regular   
exercise or taking a falls prevention   
class are great ways improve strength   
and balance. Don t forget to stay   
hydrated and bring a snack!  
? Medicine side effects: Some   
medicines can make you sleepy or   
dizzy, which could cause a fall. Ask   
your healthcare provider about the   
side effects your medicines could   
cause. Be sure to let them know if   
you take any vitamins or supplements   
as well.  
? Tripping hazards: Remove items you   
could trip on, such as loose mats,   
rugs, cords, and clutter. Wear closed   
toe shoes with rubber soles.  
? Health and wellness: Get regular   
checkups with your healthcare   
provider, plus routine vision and   
hearing screenings. Talk with your   
healthcare provider about:  
o Your medicines and the possible   
side effects - bring them in a bag if   
that is easier!  
o Problems with balance or feeling   
dizzy  
o Ways to promote bone health, such   
as Vitamin D and calcium supplements  
o Questions or concerns about falling  
  
*Ask your healthcare team if you have   
questions  
  
[facility],   
Electronically signed by Jake Jones MD at 01/10/2024 10:13 AM EST  
  
documented in this encounter            [facility]  
Work Phone:   
[phone]  
   
                                                    2023 History of   
Present illness Narrative               Formatting of this note might be   
different from the original.  
Subjective  
Patient ID: Mihaela Heaton is a 77   
y.o. male who presents for Med   
Management (Multiple concerns).  
  
HPI  
BCC - Dr Carreno - no recent visit or   
worrisome lesions. Lesions on back   
removed by me  
  
Atrial fibrillation and chronic   
rhinitis - no chest pain or racing.   
In  had a lot of swelling and   
chest congestion. Lasix 40 seems to   
have resolved. Was put on a nasal   
spray also for nasal congestion and   
got lightheaded. Had a fall with   
facial contusion Put on nasal   
Azelastine and Allegra and treated   
sinusitis with antibiotics. That has   
improved. To try cardioversion in a   
few weeks as Holter shows in Afib all   
the time. ECHO reported OK. Dr Raphael did a normal stress test   
before surgery in May 2022.  
  
GERD and Dyphagia - Frye esophagus   
not seen on last scope. Does not have   
esophagitis to explain the pain in   
the chest with work. Still on   
Pantoprazole. Felt to be spasm but   
amlodipine did not help after awhile.   
Worse with water or food at times. OK   
recently . No melena or hematochezia  
  
Carotid Bruit per Dr Raphael.   
Doppler in 2022. MIld on left  
  
Cervical spondylosis - has been good   
after injection per Dr Landers  
  
COPD, severe and emphysema - Trelegy   
seems to work. Had daytime oxygen   
levels good but on 2018 had   
nocturnal down to 87 for 8 minutes.   
So on hs oxygen at hs. He checks at   
home and stays in 90s. Dr Salmeron's   
office a few weeks ago.  
  
Coronary artery disease - No Chest   
pain except as above with esophagus,   
palpitations, numbness, weakness,   
claudications, or double vision/ loss   
of vision. He is active with stairs.  
  
Elevated PSA up to 4.38 from 3.73. No   
blood  
  
Familial hyperlipidemia - Med works   
well without side effects. Good last   
time.  
  
Glaucoma - on drops and monitoring   
with Dr Castro at [facility] in October  
  
Osteoporosis and History of   
compression fracture of spine seems   
to be pretty decent. Occasional   
Tylenol but CBD oil seems to do   
better  
  
History of lung cancer () - no   
blood. Has a cough from the PND and   
mucus better with Allegra. Dr Luna   
had stopped his lisinopril to see if   
that makes a difference but it did   
not. Had allergy testing. Allergy to   
cockroaches. Stopped smoking more   
than 15 years ago. Last CT was in   
18. Dr Salmeron. EDDIE  
  
History of pulmonary embolus (PE) -   
on Xarelto for life. No new dyspnea   
or pain.  
  
Hypertension - Med works well without   
side effects. Looks good off of   
amlodipine .  
  
Prediabetes - No polyphagia,   
polydipsia, polyuria, or non healing   
sores. A1C 6.1 in December. FBS this   
time 100.  
  
Lumbar facet arthropathy Tylenol   
helps this but not needed recently as   
CBD works well  
  
Osteoporosis - still on Prolia and   
doing well and bone density 21.   
No change.  
  
Seborrheic keratosis no irritated   
skin lesions. But had BCC removed   
last year  
  
Tremor - finds he is shaking a lot   
when eating. Bothering eating and   
writing. No meds. Discussed   
medication options and tried   
Primidone helped only a little and   
made him dizzy.  
  
Scope 18  
PSA 22 has increased from 3.73   
to 4.38  
AAA screen 20  
  
Review of Systems  
  
Objective  
/68 (BP Location: Left arm,   
Patient Position: Sitting)   Pulse 65   
  Ht 1.803 m (5' 11 )   Wt 76.7 kg   
(169 lb)   SpO2 98%   BMI 23.57 kg/m  
  
Physical Exam  
  
Assessment/Plan  
  
  
Electronically signed by Jake Jones MD at 2023 10:10 AM EDT  
documented in this encounter            [facility]  
Work Phone:   
[phone]  
   
                                                    2023 History of   
Present illness Narrative               Formatting of this note might be   
different from the original.  
Subjective  
Patient ID: Mihaela Heaton is a 77   
y.o. male who presents for Headache   
(Pt c/o headache for several days).  
Sinus drainage  forever . Seems to   
collect in chest and coughs a lot   
until seems to dislodge, often at   
night.  
Has tried mucinex, zyrtec, Tylenol   
severe sinus. Tylenol seems to help a   
little except for cough. Didn't feel   
well on Zyrtec  
Saw pulmonology and was given   
ipatropium bromide solution,   
prednisone and Amoxicillin but these   
didn't really seem to help. Discussed   
trying a different   
antihistamine/claritan and maybe   
different nasal steroid spray. Saline   
sprayis/johnathan pot may be helpful.  
  
Has been having headaches since   
Mother's Day.  
Went to [facility] and before he   
left felt horrible  pain in chest    
Has history of esophageal problems.   
Had Barretts in the past and that   
went away.  
Radiated into right jaw and   
right/posterior neck/head  
Improved gradually over 3 days  
No pain now unless bends over or does   
something strenuous then right   
posterior side of neck will occur and   
radiate into head. Sometimes will   
start just by walking. Resolves with   
rest. No pain at rest.  
Some SOB with this/history COPD and   
the upper lobe of left lung has been   
removed. Uses O2 at night as needed.  
  
Has a new cardiologist Dr Escobedo in   
[place]. Last cardiology visit about   
1 year ago with Dr. Raphael. Has   
not seen Dr. Escobedo yet. Previous Dr. Raphael.  
  
Headache  
This is a new problem. The current   
episode started 1 to 4 weeks ago. The   
pain is located in the Left   
unilateral and occipital region. The   
pain does not radiate. The pain   
quality is not similar to prior   
headaches. The quality of the pain is   
described as sharp. The pain is at a   
severity of 8/10. The pain is   
moderate. Associated symptoms include   
neck pain. Pertinent negatives   
include no sinus pressure. Associated   
symptoms comments: Shortness of   
breath. The symptoms are aggravated   
by activity. He has tried nothing for   
the symptoms. His past medical   
history is significant for sinus   
disease.  
  
Review of Systems  
HENT: Positive for postnasal drip.   
Negative for sinus pressure.  
Respiratory: Positive for shortness   
of breath.  
Cardiovascular: Positive for chest   
pain.  
Musculoskeletal: Positive for neck   
pain.  
Neurological: Positive for headaches.  
  
Objective  
Physical Exam  
Constitutional:  
Appearance: Normal appearance.  
HENT:  
Head: Normocephalic.  
Right Ear: Tympanic membrane normal.  
Left Ear: Tympanic membrane normal.  
Nose: Nose normal.  
Right Sinus: No maxillary sinus   
tenderness or frontal sinus   
tenderness.  
Left Sinus: No maxillary sinus   
tenderness or frontal sinus   
tenderness.  
Mouth/Throat:  
Pharynx: Oropharynx is clear.  
Eyes:  
Conjunctiva/sclera: Conjunctivae   
normal.  
Neck:  
Vascular: No carotid bruit.  
Cardiovascular:  
Rate and Rhythm: Normal rate. Rhythm   
irregular.  
Pulmonary:  
Effort: Pulmonary effort is normal.  
Breath sounds: Normal breath sounds.  
Abdominal:  
General: Bowel sounds are normal.  
Palpations: Abdomen is soft.  
Musculoskeletal:  
General: Tenderness: mild posterior   
upper right neck..  
Cervical back: Neck supple.   
Tenderness (Right posterior upper   
neck.) present.  
Lymphadenopathy:  
Cervical: No cervical adenopathy.  
Skin:  
General: Skin is warm and dry.  
Neurological:  
Mental Status: He is alert.  
Psychiatric:  
Mood and Affect: Mood normal.  
  
Assessment/Plan  
Diagnoses and all orders for this   
visit:  
Shortness of breath on exertion  
- ECG 12 lead (Clinic Performed)  
Atrial fibrillation, unspecified type   
(CMS/HCC)  
- ECG 12 lead (Clinic Performed)  
  
Discussed follow up 2 weeks to   
recheck, but prefers to wait until   
23 and see Dr. Jones.  
Suggestions for sinus drainage in   
HPI.  
EKG read by Dr. Evans. No   
recent EKG available for comparison,   
but no obvious acute changes.  
Advised to see his Cardiologist in   
the next 1-2 weeks to evaluate.   
Thinks he may have an appointment.  
Can try heat to area posterior neck   
2-3 times a day.  
Follow up/ER any concerns with   
worsening symptoms/changes.  
Electronically signed by FELIPA Parker at 2023 2:00 PM   
EDT  
documented in this encounter            [facility]  
Work Phone:   
[phone]  
   
                                                    2022 History of   
Present illness Narrative               Thinks may have UTI.Has been having   
dysuria and frequency. Voiding small   
amounts. Urgency. Symptoms x about 1   
week.Started last week after he   
started his steroid medication from   
Dr. Landers. Was taking it for his   
neck pain.Reviewed prednisone in   
[employer] and UTI or urological   
symptoms not mentioned.No hematuria.   
No back or abdominal pain. No   
testicular tenderness.Symptoms   
unchanged.                              -[facility]  
Work Phone:   
[phone]  
   
                                                    2022 History of   
Present illness Narrative               Patient is a pleasant 76-year-old   
male presenting today for post   
operative evaluation as he is 9 days   
status post trigger release of the   
left thumb performed on 2022.   
Patient states he has been doing well   
post operatively as he is without   
significant pain at this time. He   
denies any numbness, clicking, and   
catching or the thumb. He does have   
tenderness of the thumb and around   
the surgical incision. The incision   
is otherwise healing nicely with no   
signs of infection. Additionally,   
patient reports he had a recent   
change in his medication Pantoprazole   
and has also been having difficulty   
with swelling. He states the swelling   
of his foot and leg has been   
worsening since this change of   
medication. He does mention an   
incident where he was moving his foot   
while driving using the gas pedal, he   
felt a significant sharp pain that he   
described as feeling similar to felt   
 electricity.  He is going to discuss   
this with his cardiologist as he is   
scheduled for an upcoming office   
visit.                                  -[facility]   
Orthopedics and Sports   
Medicine [facility]  
Work Phone:   
[phone]  
   
                                                    2022 History of   
Present illness Narrative               Patient is a pleasant 76-year-old   
male presenting today for post   
operative evaluation as he is 9 days   
status post trigger release of the   
left thumb performed on 2022.   
Patient states he has been doing well   
post operatively as he is without   
significant pain at this time. He   
denies any numbness, clicking, and   
catching or the thumb. He does have   
tenderness of the thumb and around   
the surgical incision. The incision   
is otherwise healing nicely with no   
signs of infection. Additionally,   
patient reports he had a recent   
change in his medication Pantoprazole   
and has also been having difficulty   
with swelling. He states the swelling   
of his foot and leg has been   
worsening since this change of   
medication. He does mention an   
incident where he was moving his foot   
while driving using the gas pedal, he   
felt a significant sharp pain that he   
described as feeling similar to felt   
 electricity.  He is going to discuss   
this with his cardiologist as he is   
scheduled for an upcoming office   
visit.                                  [facility]   
Orthopedics and Sports   
Medicine [address]  
Work Phone:   
[phone]  
   
                                        2022 Note     PROCEDURE DETAILS  
Preoperative Diagnosis:  
Trigger finger of left thumb, M65.312  
  
Postoperative Diagnosis:  
Trigger finger of left thumb, M65.312  
  
Surgeon: Amaury Gaffney  
Resident/Fellow/Other Assistant: None   
of these were associated with this   
case  
  
Procedure:  
1. TRIGGER RELEASE L THUMB  
  
Anesthesia: No anesthesiologist   
associated with this case  
Estimated Blood Loss: less than 5ml  
Findings: as noted above  
Specimens(s) Collected: no,  
Complications: none  
Tourniquet Times: 4minutes  
Patient Returned To/Condition: stable   
to pacu  
  
  
  
Operative Report:  
Indicationspatient presented the   
outpatient orthopedic surgical office   
with  
complaints of triggering in the left   
thumb triggering has been progressive   
and  
constant for some time causing   
significant distress we did attempt   
an injection  
which did not provide any relief   
having failed more conservative   
measures  
patient patient did wish to proceed   
with operative intervention risk   
benefits  
alternatives were discussed and   
written informed consent was obtained  
  
Description procedurepatient was   
taken the preoperative holding area   
once  
again the risk benefits alternatives   
indication were discussed the   
operative  
site was marked and agreed upon   
patient was taken to the operative   
room by  
nursing anesthesia staff IV sedation   
was administered when the patient was  
appropriately sedate a local   
anesthetic block was administered of   
1% lidocaine  
in the area of the operative site and   
and the left hand was prepped and   
draped  
in typical fashion operative timeout   
was undertaken identifying correct   
patient  
site and procedure all were in   
agreement. Preoperative antibiotics   
were  
ministered prior to incision a   
transverse incision was made   
overlying the A1  
pulley of the thumb care was taken to   
incise only through the skin and the  
subcutaneous tissue was bluntly   
dissected down to the level of the A1   
pulley  
and the A1 pulley was identified   
neurovascular bundles were protected   
both  
radially and ulnarly and the [place]   
blade was used to incise the A1   
pulley  
longitudinally in the proximal distal   
aponeurosis was transected using   
Littler  
scissors tendon was then captured and   
pulled through the wound and full  
excision of the tendon was noted with   
good flexion extension without any   
active  
triggering the wound was mara   
irrigated with normal saline solution   
and the  
skin was closed with 5-0 Prolene in a   
horizontal interrupted fashion   
Xeroform 4  
x 4 web roll and an Ace wrap was used   
to dress the wound the patient was   
awake  
from anesthesia transferred to [facility]   
and taken back in stable condition  
tolerated procedure well without   
complication all needle sponge counts   
were  
correct  
  
Postoperative course patient will be   
limited weightbearing and activity in   
the  
left hand remove the dressing in 3   
days and follow-up in 10 days for   
stitch  
removal  
  
  
  
  
  
  
  
  
  
  
  
Attestation:  
Note Completion:  
Attending AttestationI performed the   
procedure without a resident  
  
  
  
Electronic Signatures:  
Amaury Gaffney) (Signed   
04-May-2022 10:26)  
Authored: Post-Operative Note, Chart   
Review, Note Completion  
  
  
Last Updated: 04-May-2022 10:26 by   
Amaury Gaffney)                    [facility]  
   
                                        2022 Note     History of Present I  
llness:  
History Present Illness:  
Reason for surgery: Trigger finger  
HPI:  
Patient suffered from a trigger   
finger with progressively worsening   
symptoms  
did desire surgical intervention  
  
Allergies:  
Allergies:  
No Known Allergies:  
  
Home Medication Review:  
Home Medications Reviewed: yes  
  
Impression/Procedure:  
Impression and Planned Procedure:   
Trigger finger  
Trigger finger release  
  
  
ERAS (Enhanced Recovery After   
Surgery):  
ERAS Patient: no  
  
  
Physical Exam by System:  
  
Respiratory/Thorax: Patent airways,   
CTAB, normal breath sounds with good   
chest  
expansion, thorax symmetric  
Cardiovascular: Regular, rate and   
rhythm, no murmurs, 2+ equal pulses   
of the  
extremities, normal S 1and S 2  
  
Consent:  
COVID-19 Consent:  
COVID-19 Risk ConsentSurgeon has   
reviewed key risks related to the   
risk of  
khalif COVID-19 and if they   
contract COVID-19 what the risks are.  
  
  
Electronic Signatures:  
Amaury Gaffney) (Signed   
04-May-2022 07:21)  
Authored: History of Present Illness,   
Allergies, Home Medication Review,  
Impression/Procedure, ERAS, Physical   
Exam, Consent, Note Completion  
  
  
Last Updated: 04-May-2022 07:21 by   
Amaury Gaffney ()                    [facility]  
   
                                                    2021 History of   
Present illness Narrative               Patient is a pleasant 76-year-old   
male presenting today for left thumb   
pain as referred by Dr. Jones. He   
states his thumb has been presenting   
with pain, locking, and triggering   
for the past 5 months. He reports   
popping and cracking. He denies any   
known recent traumatic injury to the   
thumb and/or hand, but does mention   
approximately 55 years ago he was   
playing basketball and injured his   
left thumb. He denies experiencing   
complications over the past years.   
His current symptoms are quite   
bothersome as his locking has become   
constant and pain that increases   
significantly with activity. He   
denies any recent treatment or   
at-home remedies for the thumb.   
Patient states he is opposed to   
trying a corticosteroid injection   
today and plan for surgical   
intervention.                           -[facility]   
Orthopedics and Sports   
Medicine [address]  
Work Phone:   
[phone]  
   
                                                    10- History of   
Present illness Narrative               Mihaela is a pleasant 76-year-old male   
who underwent recent endoscopy for   
worsening dysphagia. Historically   
underwent endoscopy a year ago with   
sliding-type vital hernia. This   
endoscopy had tortuous esophagus   
without demonstrable hernia. He did   
have some reflux changes distal   
esophagus for which biopsies returned   
no evidence of Frye's. He   
continues have pill dysphagia and   
suprasternal notch dysphagia with   
incomplete swallowing. He admits to   
having free reflux of any thing in   
his stomach after meals if he does   
any strenuous activity especially   
with his of his head is in an   
inverted position. He denies any   
melanic stools or hematemesis.          [facility]   
[facility]  
[address]  
Work Phone:   
[phone]  
  
  
  
                                                    Evaluation note   
  
  
  
                                                    Diagnosis  
   
                                                      
  
  
Nonintractable headache, unspecified chronicity pattern, unspecified headache 
type-   
Primary  
   
                                                      
  
  
Shortness of breath on exertion  
  
  
Shortness of breath  
   
                                                      
  
  
Atrial fibrillation, unspecified type (CMS/HCC)  
   
                                                      
  
  
Sinus drainage  
  
  
Other diseases of nasal cavity and sinuses  
  
documented in this encounter  
[facility]  
Work Phone: [phone] note*   
  
                                                    Diagnosis  
   
                                                      
  
  
Elevated PSA- Primary  
  
  
Elevated prostate specific antigen (PSA)  
   
                                                      
  
  
Age related osteoporosis, unspecified pathological fracture presence  
   
                                                      
  
  
Basal cell carcinoma (BCC) of skin of other part of torso  
   
                                                      
  
  
Chronic rhinitis  
   
                                                      
  
  
Paroxysmal atrial fibrillation (CMS/HCC)  
  
  
Atrial fibrillation  
   
                                                      
  
  
Pharyngoesophageal dysphagia  
  
  
Dysphagia, pharyngoesophageal phase  
   
                                                      
  
  
COPD, severe (CMS/HCC)  
   
                                                      
  
  
Centrilobular emphysema (CMS/HCC)  
   
                                                      
  
  
Acute diastolic congestive heart failure (CMS/HCC)  
   
                                                      
  
  
Familial hyperlipidemia  
  
  
Other and unspecified hyperlipidemia  
   
                                                      
  
  
Gastroesophageal reflux disease without esophagitis  
  
  
Esophageal reflux  
   
                                                      
  
  
Prediabetes  
  
  
Other abnormal glucose  
   
                                                      
  
  
Hypoxemia  
   
                                                      
  
  
Primary hypertension  
  
  
Unspecified essential hypertension  
   
                                                      
  
  
Lumbar facet arthropathy  
  
  
Spondylosis of unspecified site without mention of myelopathy  
   
                                                      
  
  
Cervical spondylosis  
  
  
Cervical spondylosis without myelopathy  
   
                                                      
  
  
Glaucoma of both eyes, unspecified glaucoma type  
  
documented in this encounter  
[facility]  
Work Phone: [phone] note*   
  
                                                    Diagnosis  
   
                                                      
  
  
Routine general medical examination at health care facility- Primary  
  
  
Routine general medical examination at a health care facility  
   
                                                      
  
  
COPD, severe (CMS/HCC)  
   
                                                      
  
  
Elevated PSA  
  
  
Elevated prostate specific antigen (PSA)  
   
                                                      
  
  
Paroxysmal atrial fibrillation (CMS/HCC)  
  
  
Atrial fibrillation  
   
                                                      
  
  
Osteoporosis without current pathological fracture, unspecified osteoporosis 
type  
   
                                                      
  
  
Atherosclerosis of native coronary artery of native heart with angina pectoris   
(CMS/HCC)  
   
                                                      
  
  
Basal cell carcinoma (BCC) of skin of other part of torso  
   
                                                      
  
  
Centrilobular emphysema (CMS/HCC)  
   
                                                      
  
  
Cervical spondylosis  
  
  
Cervical spondylosis without myelopathy  
   
                                                      
  
  
Chronic rhinitis  
   
                                                      
  
  
Pharyngoesophageal dysphagia  
  
  
Dysphagia, pharyngoesophageal phase  
   
                                                      
  
  
Essential tremor  
   
                                                      
  
  
Familial hyperlipidemia  
  
  
Other and unspecified hyperlipidemia  
   
                                                      
  
  
Gastroesophageal reflux disease without esophagitis  
  
  
Esophageal reflux  
   
                                                      
  
  
Acquired hypothyroidism  
  
  
Unspecified hypothyroidism  
   
                                                      
  
  
Primary hypertension  
  
  
Unspecified essential hypertension  
   
                                                      
  
  
History of pulmonary embolism  
  
  
Personal history of venous thrombosis and embolism  
   
                                                      
  
  
Glaucoma of both eyes, unspecified glaucoma type  
   
                                                      
  
  
Hypoxemia  
   
                                                      
  
  
Prediabetes  
  
  
Other abnormal glucose  
   
                                                      
  
  
Seborrheic keratosis  
  
documented in this encounter  
[facility]  
Work Phone: [phone] note*   
  
                                                    Diagnosis  
   
                                                      
  
  
Skin lesions- Primary  
   
                                                      
  
  
Age related osteoporosis, unspecified pathological fracture presence  
  
documented in this encounter  
[facility]  
Work Phone: [phone] note*   
  
                                                    Diagnosis  
   
                                                      
  
  
Osteoporosis, unspecified osteoporosis type, unspecified pathological fracture   
presence  
  
documented in this encounter  
[facility]   
  
                                                    Diagn[facility]  
   
                                                      
  
  
Osteoporosis, unspecified osteoporosis type, unspecified pathological fracture   
presence- Primary  
  
documented in this encounter  
[facility]   
  
                                                    Diagnosis  
   
                                                      
  
  
Acute bronchitis, unspecified organism- Primary  
   
                                                      
  
  
Chronic rhinitis  
  
documented in this encounter  
[facility]   
  
                                                    Diagnosis  
   
                                                      
  
  
Other fatigue- Primary  
   
                                                      
  
  
Pharyngitis, unspecified etiology  
   
                                                      
  
  
Other fatigue  
   
                                                      
  
  
Pharyngitis, unspecified etiology  
  
documented in this encounter  
[facility]  
Work Phone: [phone] note*   
  
                                                    Diagnosis  
   
                                                      
  
  
Actinic skin damage- Primary  
   
                                                      
  
  
Elevated PSA  
  
  
Elevated prostate specific antigen (PSA)  
   
                                                      
  
  
Paroxysmal atrial fibrillation (Multi)  
  
  
Atrial fibrillation  
   
                                                      
  
  
Atherosclerosis of native coronary artery of native heart without angina 
pectoris  
   
                                                      
  
  
Basal cell carcinoma (BCC) of skin of lower extremity including hip, unspecified
  
laterality  
   
                                                      
  
  
Stenosis of right carotid artery  
  
  
Occlusion and stenosis of carotid artery without mention of cerebral infarction  
   
                                                      
  
  
Centrilobular emphysema (Multi)  
   
                                                      
  
  
Cervical spondylosis  
  
  
Cervical spondylosis without myelopathy  
   
                                                      
  
  
Chronic rhinitis  
   
                                                      
  
  
COPD, severe (Multi)  
   
                                                      
  
  
Pharyngoesophageal dysphagia  
  
  
Dysphagia, pharyngoesophageal phase  
   
                                                      
  
  
Essential tremor  
   
                                                      
  
  
Familial hyperlipidemia  
  
  
Other and unspecified hyperlipidemia  
   
                                                      
  
  
Gastroesophageal reflux disease without esophagitis  
  
  
Esophageal reflux  
   
                                                      
  
  
Glaucoma of both eyes, unspecified glaucoma type  
   
                                                      
  
  
History of pulmonary embolism  
  
  
Personal history of venous thrombosis and embolism  
   
                                                      
  
  
Primary hypertension  
  
  
Unspecified essential hypertension  
   
                                                      
  
  
Acquired hypothyroidism  
  
  
Unspecified hypothyroidism  
   
                                                      
  
  
Hypoxemia  
   
                                                      
  
  
Lentigines  
   
                                                      
  
  
Lumbar facet arthropathy  
  
  
Spondylosis of unspecified site without mention of myelopathy  
   
                                                      
  
  
Localized osteoporosis without current pathological fracture  
   
                                                      
  
  
Prediabetes  
  
  
Other abnormal glucose  
   
                                                      
  
  
Posterior tibial tendinitis of right lower extremity  
  
documented in this encounter  
[facility]  
Work Phone: [phone] note*   
  
                                                    Diagnosis  
   
                                                      
  
  
Chronic cough- Primary  
  
  
Cough  
   
                                                      
  
  
Oropharyngeal dysphagia  
  
  
Dysphagia, oropharyngeal phase  
   
                                                      
  
  
Tremor  
  
  
Abnormal involuntary movements  
  
documented in this encounter  
[facility]   
  
                                                    Diagnosis  
   
                                                      
  
  
Medicare annual wellness visit, subsequent- Primary  
   
                                                      
  
  
At moderate risk for fall  
   
                                                      
  
  
Abnormal finding of blood chemistry, unspecified  
   
                                                      
  
  
Need for hepatitis C screening test  
  
  
Special screening examination for other specified viral diseases  
   
                                                      
  
  
Asthma-COPD overlap syndrome (HCC)  
   
                                                      
  
  
Paroxysmal atrial fibrillation (HCC)  
  
  
Atrial fibrillation  
   
                                                      
  
  
Productive cough  
  
  
Cough  
   
                                                      
  
  
Hypertension goal BP (blood pressure) < 140/90  
  
  
Unspecified essential hypertension  
   
                                                      
  
  
Orthostatic hypotension  
   
                                                      
  
  
Acquired hypothyroidism  
  
  
Unspecified hypothyroidism  
   
                                                      
  
  
History of Frye's esophagus  
  
documented in this encounter  
[facility]   
  
                                                    Diagnosis  
   
                                                      
  
  
Coronary artery disease involving native coronary artery of native heart without
  
angina pectoris- Primary  
   
                                                      
  
  
Acquired hypothyroidism  
  
  
Unspecified hypothyroidism  
  
documented in this encounter  
[facility]   
  
                                                    Diagnosis  
   
                                                      
  
  
Oropharyngeal dysphagia- Primary  
  
  
Dysphagia, oropharyngeal phase  
  
documented in this encounter  
[facility]   
  
                                                    Diagnosis  
   
                                                      
  
  
Oropharyngeal dysphagia  
  
  
Dysphagia, oropharyngeal phase  
  
documented in this encounter  
[facility] NarrativeJerry is seen today. Having 
increasing reflux symptoms with nocturnal heartburn and heartburn throughout the
day. Worse with eating did try Carafate without improvement feels fullness in 
his lower esophagus after meals with occasional feeling of spasm triggered by 
drinking liquids that are too hot or too cold. No hematemesis no melena. Upper 
endoscopy performed summer 2020 revealed paraesophagealhiatal hernia medium 
size.[facility] [place] [address]  
Work Phone: [phone] of Present illness NarrativeRicardo is seen 
today. Having increasing reflux symptoms with nocturnal heartburn and heartburn 
throughout the day. Worse with eating did try Carafate without improvement feels
fullness in his lower esophagus after meals with occasional feeling of spasm 
triggered by drinking liquids that are too hot or too cold. No hematemesis no 
melena. Upper endoscopy performed summer 2020 revealed paraesophagealhiatal 
hernia medium size.[facility]  
Work Phone: [phone] of Present illness NarrativeRicardo is seen 
today. Having increasing reflux symptoms with nocturnal heartburn and heartburn 
throughout the day. Worse with eating did try Carafate without improvement feels
fullness in his lower esophagus after meals with occasional feeling of spasm 
triggered by drinking liquids that are too hot or too cold. No hematemesis no 
melena. Upper endoscopy performed summer 2020 revealed paraesophagealhiatal 
hernia medium size.[facility]  
Work Phone: [phone] of Present illness Narrative* Ricardo is seen 
  today in follow-up. Upper endoscopy showed no stricture. No hiatal hernia. 
  Esophagealmanometry showed ineffective esophageal motility and low LES 
  pressure. pH testing did not show concordance of symptoms with reflux. No 
  improvement is noted with twice daily dosing of Protonix he is dropped back to
  once daily.  
* Reviewed the findings of the study. At this point dietary modification change 
  now eats his most effective strategy. He will on occasion have a feeling of 
  tightness in esophagus after eating especially if he eats too much or too 
  rapidly. I have explained to him that this could be esophageal spasms as he 
  does have symptoms like this if he drinks something with ice cold liquids or 
  ice cream.  
[facility] [place] [address]  
Work Phone: [phone] of Present illness Narrative* The patient is 
  being seen for the subsequent annual wellness visit.  
* Past Medical, Surgical and Family History: reviewed and updated in chart.  
* Medications and Supplements: Medications and supplements, including calcium 
  and vitamins reviewed and updated in chart.  
* No, the patient is not using opioids.  
* Patient Self Assessment of Health Status: fair.  
* Tobacco use: User The patient is a former cigarette smoker and quit smoking 
  . He has 80 pack year(s) of cigarette use.  
* Alcohol use: User The patient reports occasional alcohol use and drinking 2 
  drinks per week. The patient has no concerns about alcohol abuse.  
* Illicit drug use: Non-User  
* Current diet: well balanced diet, does not consume adequate fluids and does 
  not consume caffeine.  
* Exercise Frequency: the patient does not exercise.  
* Depression/Suicide Screening: .  
* During the past 2 weeks, the patient has not felt down, depressed or hopeless.  
* During the past 2 weeks, the patient felt little interest or pleasure in doing
  things.  
* PHQ-9 Depression Scale:  
* 1. Little interest or pleasure in doing things - nearly every day  
* 2. Feeling down, depressed or hopeless - not at all  
* 3. Trouble falling asleep or sleeping too much - not at all  
* 4. Feeling tired or having little energy - not at all  
* 5. Poor appetite or overeating - not at all  
* 6. Feeling bad about self or failure or letting others down - not at all  
* 7. Trouble concentrating on things - not at all  
* 8. Moving / speaking slowly or fidgety / restless - not at all  
* 9. Thought would be better off dead or hurting self - not at all  
* Severity of depression is in remission, total score: 3.  
* Hearing Impairment: Patient has significant hearing impairment, bilaterally.  
* Cognitive Impairment: No cognitive impairment observed, patient or family 
  reported cognitive impairment .  
* Has been short term of recent information. Names and word finding OK.  
* Bathing: performs independently.  
* Dressing: performs independently.  
* Walking: performs independently.  
* Managing Finances: performs independently.  
* Shopping: performs independently.  
* Managing Medications: performs independently.  
* Housework / Basic Home Maintenance: needs assistance.  
* Because of back for heavy work due to fractures  
* Falls Risk Screening:. MIHAELA has not fallen in the last 6 months.  
* Home safety risk factors: none.  
* Advance directives:. Patient has living will. Patient has healthcare POA.  
* Patient's End of Life Decisions: End of life decisions were reviewed with the 
  patient. I agree to follow the patient's decisions.  
* Additional Information: pain 3.  
* Since COVID-19 immunization Pfizer he has had pains in the hips, then low 
  back, then side of face, then acid reflux worse. Has been having a workup for 
  this showing a sluggish sphincter. Pain anytimebut mostly after something 
  cold. Will get pain to the back. Pantoprazole does not help. Levsin SL maybe 
  helps a bit. Has also tried nitroglycerin. Carafate did not help. I will have 
  him try Amlodipine 5 mg daily to see if that helps. Drop the lisinopril HCTZ 
  in half.  
* BCC - Dr Carreno - no recent visit or worrisome lesions  
* Atrial fibrillation - no chest pain or racing or swelling in ankles.  
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 Does not have esophagitis to 
  explain the pain. See above. Still on Pantoprazole. Feels PND may gather in 
  throat so pills seem to stick. Worse with water orfood.  
* COPD, severe and emphysema - Trelegy seems to work. Had daytime oxygen levels 
  good but on  had nocturnal down to 87 for 8 minutes. So on hs 
  oxygen at hs. Was to see pulmonologist beforeCO-. He checks at home and 
  stays in 90s. . Dr Salmeron's office about 6 months ago. Dyspnea worse with 
  mask.  
* Coronary artery disease - No Chest pain, palpitations, numbness, weakness, 
  edema, claudications, ordouble vision/ loss of vision. To see Dr Raphael 
  about a year ago.  
* Familial hyperlipidemia - Med works well without side effects. Good this time 
  except LDL a bit high  
* Glaucoma - on drops and monitoring with Dr Castro at [facility]. To see 
  tomorrow.  
* History of compression fracture of spine seems to be pretty decent. Occasional
  Tylenol but CBD oil seems to do better  
* History of lung cancer () - no blood or cough. EDDIE. Stopped smoking more 
  than 15 years ago. Last CT was in 18. Dr Salmeron  
* History of pulmonary embolus (PE) - on Xarelto for life. No new dyspnea or 
  pain  
* Hypertension - Med works well without side effects.  
* Hypoxemia - on at hs and daytime usually just stops and rests .  
* Prediabetes - No polyphagia, polydipsia, polyuria, or non healing sores. A1C 
  6.2 this time.  
* Lumbar facet arthropathy Tylenol helps this but not needed recently as CBD 
  works well  
* Osteoporosis - still on Prolia and doing well and bone density 7/1/19. No 
  change.  
* Seborrheic keratosis no irritated skin lesions.  
* Tremor - finds he is shaking a lot when eating. Bothering eating and writing. 
  No meds. Discussed medication options and tried Primidone helped only a little
  and made him dizzy.  
* Scope 18  
* PSA 12/10/21  
* AAA screen 20  
[facility]  
Work Phone: [phone] of Present illness Narrative* Ricardo is seen 
  today in routine follow-up. Underwent full GI work-up for dysphagia last .
  At that time endoscopic biopsy showed no Frye's. He had normal LES based on
  Hill criteria with a tight LES. pH manometry testing were then ordered he had 
  no evidence of significant reflux by pH testing and his manometry showed 
  ineffective esophageal motility with spasm.  
* He was given a therapeutic trial of PPI therapy which did not seem to help but
  did improve some of his heartburn symptoms. Was placed on long-acting calcium 
  channel blocker by family doctor which is improved symptoms but not totally 
  abated them. He still has symptoms of ineffective swallow or food feeling 
  lodged if he eats something to dry or if it is too hot or too cold. Overall 
  his symptoms have improved. He was given a short-term prescription of Levsin 
  for immediate relief which he is not taking.  
* He is struggling with sinus issues feeling as though any air blowing across 
  his shoulders neck or head cause him to produce a lot of mucus he is currently
  seeing an ear nose and throat doctor who took him off his lisinopril believing
  that might be causative he has follow-up with him next week for allergy 
  testing and further management.  
[facility] [address]  
Work Phone: [phone] of Present illness Narrative* Preoperative 
  evaluation for trigger thumb release on May 4. Chronic issues as follows.  
* BCC - Dr Carreno - no recent visit or worrisome lesions  
* Atrial fibrillation - no chest pain or racing or swelling in ankles. Dr Raphael is to do a stresstest and ECHO before surgery.  
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 Does not have esophagitis to 
  explain the pain. See above. Still on Pantoprazole. Felt to be spasm and 
  amlodipine daily helps. Worse with water or food.  
* COPD, severe and emphysema - Trelegy seems to work. Had daytime oxygen levels 
  good but on  had nocturnal down to 87 for 8 minutes. So on hs 
  oxygen at hs. He checks at home and stays in 90s. . Dr Salmeron's office a few 
  days ago. Dyspnea worse with mask.  
* Coronary artery disease - No Chest pain, palpitations, numbness, weakness, 
  edema, claudications, ordouble vision/ loss of vision. He is active with 
  stairs.  
* Essential tremor becoming more bothersome with eating.  
* Familial hyperlipidemia - Med works well without side effects. Good last time 
  except LDL a bit high  
* Glaucoma - on drops and monitoring with Dr Castro at [facility]. To see 
  tomorrow.  
* History of compression fracture of spine seems to be pretty decent. Occasional
  Tylenol but CBD oil seems to do better  
* History of lung cancer () - no blood. Has a cough from the PND and mucus. 
  Dr Luna has stoppedhis lisinopril to see if that makes a difference. EDDIE. 
  Stopped smoking more than 15 years ago. LastCT was in 18. Dr Salmeron  
* History of pulmonary embolus (PE) - on Xarelto for life. No new dyspnea or 
  pain.  
* Hypertension - Med works well without side effects. Looks good off of the 
  lisinopril.  
* Hypoxemia - on at hs and daytime usually just stops and rests .  
* Prediabetes - No polyphagia, polydipsia, polyuria, or non healing sores. A1C 
  6.2 last time.  
* Lumbar facet arthropathy Tylenol helps this but not needed recently as CBD 
  works well  
* Osteoporosis - still on Prolia and doing well and bone density 21. No 
  change.  
* Seborrheic keratosis no irritated skin lesions.  
* Tremor - finds he is shaking a lot when eating. Bothering eating and writing. 
  No meds. Discussed medication options and tried Primidone helped only a little
  and made him dizzy.  
* Scope 18  
* PSA 12/10/21  
* AAA screen 20  
[facility]  
Work Phone: [phone] of Present illness Narrative* fall in a family
  members home Saturday evening may have turned to quick, fell to his right 
  side, Hit the ground tried to catch myself but did hit the floor. Neck pain 
  started hurting. denies any other injury from fall. Denies hitting head  
* Pain worse with any movement.  
* Had  leftover  pain medication from ortho surgery, took and it did not help.  
* Unable to take NSAID due to anticoagulant therapy.  
* Denies any numbness or tingling  
[facility]  
Work Phone: [phone] of Present illness Narrative* 1 lesion on 
  right upper arm and 2 on back for a long time  
* Change not note  
* Itchy at time  
* No bleeding or oozing  
[facility]  
Work Phone: [phone] of Present illness NarrativeThe lesions I 
removed last time turned out to be BCC. So will remove the lesion on the back of
the arm today. Healing well and to have sutures out of prior surgery today. Has 
multiple lesions on the back that are flat [facility]  
Work Phone: [phone] of Present illness NarrativeNo problems 
reported with the suture site on his right arm. The report did show a basal cell
cancerthat was very close to one edge. So I told him that he will need to watch 
for reoccurrence but it does look like [facility]  
Work Phone: [phone] of Present illness Narrative* The patient is 
  being seen for the subsequent annual wellness visit.  
* Past Medical, Surgical and Family History: reviewed and updated in chart.  
* Medications and Supplements:  
* No, the patient is not using opioids.  
* Patient Self Assessment of Health Status: good.  
* Tobacco use: User The patient is a former cigarette smoker and quit smoking 
  . He has 80 pack year(s) of cigarette use.  
* Alcohol use: User The patient reports occasional alcohol use and drinking 2 
  drinks per week. The patient has no concerns about alcohol abuse.  
* Illicit drug use: Non-User  
* Current diet: well balanced diet, does consume adequate fluids and does not 
  consume caffeine.  
* Exercise Frequency: the patient does not exercise.  
* Depression/Suicide Screening: .  
* During the past 2 weeks, the patient has not felt down, depressed or hopeless.  
* During the past 2 weeks, the patient has not felt little interest or pleasure 
  in doing things.  
* Hearing Impairment: Patient has significant hearing impairment, bilaterally.  
* Cognitive Impairment: No cognitive impairment observed, patient or family 
  reported cognitive impairment .  
* Has been short term of recent information. Names and word finding OK.  
* Bathing: performs independently.  
* Dressing: performs independently.  
* Walking: performs independently.  
* Managing Finances: performs independently.  
* Shopping: performs independently.  
* Managing Medications: performs independently.  
* Housework / Basic Home Maintenance: needs assistance.  
* Because of back for heavy work due to fractures  
* Falls Risk Screening:. MIHAELA has not fallen in the last 6 months.  
* Home safety risk factors: none.  
* Advance directives:. Advanced Care Planning discussed and documented advance 
  care plan or surrogatedecision maker documented in the medical record. Patient
  has living will. Patient has healthcare POA.  
* Patient's End of Life Decisions: End of life decisions were reviewed with the 
  patient. I agree to follow the patient's decisions.  
* Additional Information: surrogates - wife and daughter.  
* BCC - Dr Carreno - no recent visit or worrisome lesions. Lesions on back removed
  by me  
* Atrial fibrillation - no chest pain or racing or swelling in ankles but did 
  have when he went off of Lisinopril / Hctz. Dr Raphael did a normal stress 
  test and ECHO before surgery in May.  
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 Does not have esophagitis to 
  explain the pain in the chest with work. Still on Pantoprazole. Felt to be 
  spasm but amlodipine did not help after awhile. Worse with water or food at 
  times. . Today is a good day. No melena or hematochezia  
* Carotid Bruit per Dr Raphael. Doppler in 2022. MIld on left  
* Cervical spondylosis - has been good after injection per Dr Landers  
* COPD, severe and emphysema - Trelegy seems to work. Had daytime oxygen levels 
  good but on  had nocturnal down to 87 for 8 minutes. So on hs 
  oxygen at hs. He checks at home and stays in 90s. Dr Salmeron's office a few 
  months ago. Dyspnea worse with mask.  
* Coronary artery disease - No Chest pain except as above with esophagus, 
  palpitations, numbness, weakness, edema, claudications, or double vision/ loss
  of vision. He is active with stairs.  
* Familial hyperlipidemia - Med works well without side effects. Good this time.  
* Glaucoma - on drops and monitoring with Dr Castro at [facility] in October  
* History of compression fracture of spine seems to be pretty decent. Occasional
  Tylenol but CBD oil seems to do better  
* History of lung cancer () - no blood. Has a cough from the PND and mucus. 
  Dr Luna has stoppedhis lisinopril to see if that makes a difference but it 
  did not. Had allergy testing. Allergy to cockroaches. Stopped smoking more 
  than 15 years ago. Last CT was in 18. Dr Salmeron. EDDIE  
* History of pulmonary embolus (PE) - on Xarelto for life. No new dyspnea or 
  pain.  
* Hypertension - Med works well without side effects. Looks good off of 
  amlodipine .  
* Prediabetes - No polyphagia, polydipsia, polyuria, or non healing sores. A1C 
  6.1 in December  
* Lumbar facet arthropathy Tylenol helps this but not needed recently as CBD 
  works well  
* Osteoporosis - still on Prolia and doing well and bone density 21. No 
  change.  
* Seborrheic keratosis no irritated skin lesions. But had BCC removed last year  
* Tremor - finds he is shaking a lot when eating. Bothering eating and writing. 
  No meds. Discussed medication options and tried Primidone helped only a little
  and made him dizzy.  
* Scope 18  
* PSA 22 has increased from 3.75 to 4.25.  
* AAA screen 20  
[facility]  
Work Phone: [phone] for referral (narrative)* Consultation 
  (Routine) - Authorized  
  
                          Specialty    Diagnoses / Procedures Referred By Contac  
t Referred To Contact  
   
                                        Primary Care          
  
  
Diagnoses  
  
  
COPD, severe (CMS/HCC)  
  
  
  
Procedures  
  
  
Follow Up In Primary Care -   
Established                               
  
  
Jake Jones  [address]  
  
  
[facility], [facility] [address]  
  
  
[place] [address]  
  
  
Phone: [phone]  
  
  
Fax: [phone]                         
  
  
  
  
  
                    Referral ID Status    Reason    Start Date Expiration Date V  
isits   
Requested                               Visits   
Authorized  
   
                [id number]  Authorized         2023 1       1  
  
  
  
  
Electronically signed by Jake Jones MD at 2023 10:10 AM T  
  
  
[facility]  
Work Phone: [phone] for referral (narrative)* Consultation 
  (Routine) - Authorized  
  
                          Specialty    Diagnoses / Procedures Referred By Contac  
t Referred To Contact  
   
                                        Primary Care          
  
  
Diagnoses  
  
  
Elevated PSA  
  
  
  
Procedures  
  
  
Follow Up In Primary Care -   
Established                               
  
  
Jake Jones MD  
  
  
 [address]  
  
  
[facility], [place] [id number]  
  
  
Phone: [phone]  
  
  
Fax: [phone]                         
  
  
  
  
  
                    Referral ID Status    Reason    Start Date Expiration Date V  
isits   
Requested                               Visits   
Authorized  
   
                [id number] Authorized         1/10/2024 2025 1       1  
  
  
  
  
Electronically signed by Jake Jones MD at 01/10/2024 10:16 AM EST  
  
  
* Imaging (Routine) - Pending Review  
  
                          Specialty    Diagnoses / Procedures Referred By Contac  
t Referred To Contact  
   
                                        Radiology             
  
  
Diagnoses  
  
  
Osteoporosis without current   
pathological fracture,   
unspecified osteoporosis type  
  
  
  
Procedures  
  
  
XR DEXA bone density                      
  
  
Jake Jones MD  
  
  
 [address]  
  
  
[facility], [place] [address]  
  
  
Phone: [phone]  
  
  
Fax: [phone]                         
  
  
  
  
  
                          Referral ID  Status       Reason       Start   
Date                                    Expiration   
Date                                    Visits   
Requested                               Visits   
Authorized  
   
                                        [id number]             Pending   
Review                                    
  
  
Perform   
Procedure       1/10/2024       2025        1               1  
  
  
  
  
Electronically signed by Jake Jones MD at 01/10/2024 10:05 AM EST  
  
  
[facility]  
Work Phone: [phone] for referral (narrative)* Consultation 
  (Routine) - Authorized  
  
                          Specialty    Diagnoses / Procedures Referred By Contac  
t Referred To Contact  
   
                                        Dermatology           
  
  
Diagnoses  
  
  
Skin lesions  
                                          
  
  
Jake Jones MD  
  
  
 [address]  
  
  
[facility], [place] [address]  
  
  
Phone: [phone]  
  
  
Fax: [phone]                         
  
  
Tamia Perez MD  
  
  
[address] [facility]   
Dermat[place] & [place] [address]  
  
  
Phone: [phone]  
  
  
  
                          Referral ID  Status       Reason       Start   
Date                                    Expiration   
Date                                    Visits   
Requested                               Visits   
Authorized  
   
                                [id number]         Authorized        
  
  
Specialty   
Services   
Required        2024       1               1  
  
  
  
  
Electronically signed by Jake Jones MD at 2024 8:16 AM EDT  
  
  
[facility]  
Work Phone: [phone] for referral (narrative)* Consultation 
  (Routine) - Authorized  
  
                          Specialty    Diagnoses / Procedures Referred By Contac  
t Referred To Contact  
   
                                        Primary Care          
  
  
Diagnoses  
  
  
Paroxysmal atrial   
fibrillation (Multi)  
  
  
  
Procedures  
  
  
Follow Up In Primary Care -   
Established                               
  
  
Jake Jones MD  
  
  
 [address]  
  
  
[facility], [address]  
  
  
[place] [address]  
  
  
Phone: [phone]  
  
  
Fax: [phone]                         
  
  
  
  
  
                    Referral ID Status    Reason    Start Date Expiration Date V  
isits   
Requested                               Visits   
Authorized  
   
                [id number] Authorized         7/10/2024 7/10/2025 1       1  
  
  
  
  
Electronically signed by Jake Jones MD at 07/10/2024 9:48 AM EDT  
  
  
* Consultation (Routine) - Authorized  
  
                          Specialty    Diagnoses / Procedures Referred By Contac  
t Referred To Contact  
   
                                        Urology               
  
  
Diagnoses  
  
  
Elevated PSA  
                                          
  
  
Jake Jones MD  
  
  
 [address]  
  
  
[facility], [address]  
  
  
[place] [address]  
  
  
Phone: [phone]  
  
  
Fax: [phone]                         
  
  
Praveen [place] MD  
  
  
[address] [place] [address]  
  
  
Phone: [phone]  
  
  
Fax: [phone]  
  
  
  
                          Referral ID  Status       Reason       Start   
Date                                    Expiration   
Date                                    Visits   
Requested                               Visits   
Authorized  
   
                                [id number]         Authorized        
  
  
Specialty   
Services   
Required        7/10/2024       7/10/2025       1               1  
  
  
  
  
Electronically signed by Jake Jones MD at 07/10/2024 9:43 AM EDT  
  
  
[facility]  
Work Phone: [phone] for visit Narrative* Episode Based Medication 
  (Routine) - Authorized  
  
                          Specialty    Diagnoses / Procedures Referred By Contac  
t Referred To Contact  
   
                                        Infusion Therapy      
  
  
Diagnoses  
  
  
Osteoporosis, unspecified   
osteoporosis type,   
unspecified pathological   
fracture presence  
  
  
  
Procedures  
  
  
VT INJECTION, DENOSUMAB,   
1 MG                                      
  
  
Manuel Avelar MD  
  
  
[address] [place] [address]  
  
  
Phone:   
tel:[phone]  
  
  
fax:[phone]                       
  
  
[facility]   
Ambulatory Care  
  
  
[address] [place]   
[address]  
  
  
Phone:   
tel:[phone]  
  
  
fax:[phone]  
  
  
  
                    Referral ID Status    Reason    Start Date Expiration Date V  
isits   
Requested                               Visits   
Authorized  
   
                [id number] Authorized         10/30/2024 10/30/2025 2       2  
  
  
  
[facility]  
  
Summary Purpose  
  
  
                                                      
  
  
  
Family History  
No Family History Records FoundUnknown Family Member  
  
                                Name            Dates           Details  
   
                                                    Family history of acute myoc  
ardial infarction: Father(V17.3,   
Z82.49)  
                                                    Status:Active  
   
                                                    Family history of cardiac di  
sorder: Father(V17.49, Z82.49)  
                                                    Status:Active  
  
                              Unknown Family Member  
  
                                Name            Dates           Details  
   
                                                    Family history of acute myoc  
ardial infarction: Father(V17.3,   
Z82.49)  
                                                    Status:Active  
   
                                                    Family history of cardiac di  
sorder: Father(V17.49, Z82.49)  
                                                    Status:Active  
  
                              Unknown Family Member  
  
                                Name            Dates           Details  
   
                                                    Family history of acute myoc  
ardial infarction: Father(V17.3,   
Z82.49)  
                                                    Status:Active  
   
                                                    Family history of cardiac di  
sorder: Father(V17.49, Z82.49)  
                                                    Status:Active  
  
                              Unknown Family Member  
  
                                Name            Dates           Details  
   
                                                    Family history of acute myoc  
ardial infarction: Father(V17.3,   
Z82.49)  
                                                    Status:Active  
   
                                                    Family history of cardiac di  
sorder: Father(V17.49, Z82.49)  
                                                    Status:Active  
  
                              Unknown Family Member  
  
                                Name            Dates           Details  
   
                                                    Family history of acute myoc  
ardial infarction: Father(V17.3,   
Z82.49)  
                                                    Status:Active  
   
                                                    Family history of cardiac di  
sorder: Father(V17.49, Z82.49)  
                                                    Status:Active  
  
                              Unknown Family Member  
  
                                Name            Dates           Details  
   
                                                    Family history of acute myoc  
ardial infarction: Father(V17.3,   
Z82.49)  
                                                    Status:Active  
   
                                                    Family history of cardiac di  
sorder: Father(V17.49, Z82.49)  
                                                    Status:Active  
  
                              Unknown Family Member  
  
                                Name            Dates           Details  
   
                                                    Family history of acute myoc  
ardial infarction: Father(V17.3,   
Z82.49)  
                                                    Status:Active  
   
                                                    Family history of cardiac di  
sorder: Father(V17.49, Z82.49)  
                                                    Status:Active  
  
                              Unknown Family Member  
  
                                Name            Dates           Details  
   
                                                    Family history of acute myoc  
ardial infarction: Father(V17.3,   
Z82.49)  
                                                    Status:Active  
   
                                                    Family history of cardiac di  
sorder: Father(V17.49, Z82.49)  
                                                    Status:Active  
  
                              Unknown Family Member  
  
                                Name            Dates           Details  
   
                                                    Family history of acute myoc  
ardial infarction: Father(V17.3,   
Z82.49)  
                                                    Status:Active  
   
                                                    Family history of cardiac di  
sorder: Father(V17.49, Z82.49)  
                                                    Status:Active  
  
                              Unknown Family Member  
  
                                Name            Dates           Details  
   
                                                    Family history of acute myoc  
ardial infarction: Father(V17.3,   
Z82.49)  
                                                    Status:Active  
   
                                                    Family history of cardiac di  
sorder: Father(V17.49, Z82.49)  
                                                    Status:Active  
  
                              Unknown Family Member  
  
                                Name            Dates           Details  
   
                                                    Family history of acute myoc  
ardial infarction: Father(V17.3,   
Z82.49)  
                                                    Status:Active  
   
                                                    Family history of cardiac di  
sorder: Father(V17.49, Z82.49)  
                                                    Status:Active  
  
                              Unknown Family Member  
  
                                Name            Dates           Details  
   
                                                    Family history of cardiac di  
sorder: Father(V17.49, Z82.49)  
                                                    Status:Active  
   
                                                    Family history of acute myoc  
ardial infarction: Father(V17.3,   
Z82.49)  
                                                    Status:Active  
  
                              Unknown Family Member  
  
                                Name            Dates           Details  
   
                                                    Family history of acute myoc  
ardial infarction: Father(V17.3,   
Z82.49)  
                                                    Status:Active  
   
                                                    Family history of cardiac di  
sorder: Father(V17.49, Z82.49)  
                                                    Status:Active  
  
                              Unknown Family Member  
  
                                Name            Dates           Details  
   
                                                    Family history of acute myoc  
ardial infarction: Father(V17.3,   
Z82.49)  
                                                    Status:Active  
   
                                                    Family history of cardiac di  
sorder: Father(V17.49, Z82.49)  
                                                    Status:Active  
  
                              Unknown Family Member  
  
                                Name            Dates           Details  
   
                                                    Family history of acute myoc  
ardial infarction: Father(V17.3,   
Z82.49)  
                                                    Status:Active  
   
                                                    Family history of cardiac di  
sorder: Father(V17.49, Z82.49)  
                                                    Status:Active  
  
                              Unknown Family Member  
  
                                Name            Dates           Details  
   
                                                    Family history of acute myoc  
ardial infarction: Father(V17.3,   
Z82.49)  
                                                    Status:Active  
   
                                                    Family history of cardiac di  
sorder: Father(V17.49, Z82.49)  
                                                    Status:Active  
  
                              Unknown Family Member  
  
                                Name            Dates           Details  
   
                                                    Family history of acute myoc  
ardial infarction: Father(V17.3,   
Z82.49)  
                                                    Status:Active  
   
                                                    Family history of cardiac di  
sorder: Father(V17.49, Z82.49)  
                                                    Status:Active  
  
                              Unknown Family Member  
  
                                Name            Dates           Details  
   
                                                    Family history of acute myoc  
ardial infarction: Father(V17.3,   
Z82.49)  
                                                    Status:Active  
   
                                                    Family history of cardiac di  
sorder: Father(V17.49, Z82.49)  
                                                    Status:Active  
  
                              Unknown Family Member  
  
                                Name            Dates           Details  
   
                                                    Family history of acute myoc  
ardial infarction: Father(V17.3,   
Z82.49)  
                                                    Status:Active  
   
                                                    Family history of cardiac di  
sorder: Father(V17.49, Z82.49)  
                                                    Status:Active  
  
                              Unknown Family Member  
  
                                Name            Dates           Details  
   
                                                    Family history of acute myoc  
ardial infarction: Father(V17.3,   
Z82.49)  
                                                    Status:Active  
   
                                                    Family history of cardiac di  
sorder: Father(V17.49, Z82.49)  
                                                    Status:Active  
  
                              Unknown Family Member  
  
                                Name            Dates           Details  
   
                                                    Family history of acute myoc  
ardial infarction: Father(V17.3,   
Z82.49)  
                                                    Status:Active  
   
                                                    Family history of cardiac di  
sorder: Father(V17.49, Z82.49)  
                                                    Status:Active  
  
                              Unknown Family Member  
  
                                Name            Dates           Details  
   
                                                    Family history of acute myoc  
ardial infarction: Father(V17.3,   
Z82.49)  
                                                    Status:Active  
   
                                                    Family history of cardiac di  
sorder: Father(V17.49, Z82.49)  
                                                    Status:Active  
  
                              Unknown Family Member  
  
                                Name            Dates           Details  
   
                                                    Family history of acute myoc  
ardial infarction: Father(V17.3,   
Z82.49)  
                                                    Status:Active  
   
                                                    Family history of cardiac di  
sorder: Father(V17.49, Z82.49)  
                                                    Status:Active  
  
                              Unknown Family Member  
  
                                Name            Dates           Details  
   
                                                    Family history of acute myoc  
ardial infarction: Father(V17.3,   
Z82.49)  
                                                    Status:Active  
   
                                                    Family history of cardiac di  
sorder: Father(V17.49, Z82.49)  
                                                    Status:Active  
  
                              Unknown Family Member  
  
                                Name            Dates           Details  
   
                                                    Family history of acute myoc  
ardial infarction: Father(V17.3,   
Z82.49)  
                                                    Status:Active  
   
                                                    Family history of cardiac di  
sorder: Father(V17.49, Z82.49)  
                                                    Status:Active  
  
                              Unknown Family Member  
  
                                Name            Dates           Details  
   
                                                    Family history of acute myoc  
ardial infarction: Father(V17.3,   
Z82.49)  
                                                    Status:Active  
   
                                                    Family history of cardiac di  
sorder: Father(V17.49, Z82.49)  
                                                    Status:Active  
  
                              Unknown Family Member  
  
                                Name            Dates           Details  
   
                                                    Family history of acute myoc  
ardial infarction: Father(V17.3,   
Z82.49)  
                                                    Status:Active  
   
                                                    Family history of cardiac di  
sorder: Father(V17.49, Z82.49)  
                                                    Status:Active  
  
                              Unknown Family Member  
  
                                Name            Dates           Details  
   
                                                    Family history of acute myoc  
ardial infarction: Father(V17.3,   
Z82.49)  
                                                    Status:Active  
   
                                                    Family history of cardiac di  
sorder: Father(V17.49, Z82.49)  
                                                    Status:Active  
  
                              Unknown Family Member  
  
                                Name            Dates           Details  
   
                                                    Family history of acute myoc  
ardial infarction: Father(V17.3,   
Z82.49)  
                                                    Status:Active  
   
                                                    Family history of cardiac di  
sorder: Father(V17.49, Z82.49)  
                                                    Status:Active  
  
                              Unknown Family Member  
  
                                Name            Dates           Details  
   
                                                    Family history of acute myoc  
ardial infarction: Father(V17.3,   
Z82.49)  
                                                    Status:Active  
   
                                                    Family history of cardiac di  
sorder: Father(V17.49, Z82.49)  
                                                    Status:Active  
  
                              Unknown Family Member  
  
                                Name            Dates           Details  
   
                                                    Family history of acute myoc  
ardial infarction: Father(V17.3,   
Z82.49)  
                                                    Status:Active  
   
                                                    Family history of cardiac di  
sorder: Father(V17.49, Z82.49)  
                                                    Status:Active  
  
                              Unknown Family Member  
  
                                Name            Dates           Details  
   
                                                    Family history of acute myoc  
ardial infarction: Father(V17.3,   
Z82.49)  
                                                    Status:Active  
   
                                                    Family history of cardiac di  
sorder: Father(V17.49, Z82.49)  
                                                    Status:Active  
  
                              Unknown Family Member  
  
                                Name            Dates           Details  
   
                                                    Family history of acute myoc  
ardial infarction: Father(V17.3,   
Z82.49)  
                                                    Status:Active  
   
                                                    Family history of cardiac di  
sorder: Father(V17.49, Z82.49)  
                                                    Status:Active  
  
  
  
Advance Directives  
No Advanced Directives Records FoundDocuments on File  
  
                          Type         Date Recorded Patient Representative Expl  
anation  
   
                          Power of  2025   
POWER OF   
  
                                Documents on File  
  
                          Type         Date Recorded Patient Representative Expl  
anation  
   
                          Power of  2025   
POWER OF   
  
  
  
Chief Complaint  
FUV in office today for reflux. Patient is taking pantoprazole and did short 
term Sucralfate which did not help. Patient states it has been the worst in the 
past 4 months with burning in his chest.FUV in office today for reflux. Patient 
is taking pantoprazole and did short term Sucralfate which did not help. Patient
states it has been the worst in the past 4 months with burning in his chest.FUV 
in office today for reflux. Patient is taking pantoprazole and did short term 
Sucralfate which did not help. Patient states it has been the worst in the past 
4 months with burning in his chest.FUV in office from EGD. Patient states he is 
still having issues with reflux and dysphagia.* Pt. here for Prolia injection  
* given 1ml subcu left upper arm  
* pt. tolerated well and no adverse reaction  
* Next due in 6 months, around 22  
* Mfg amgen  
* lot [id number]  
* exp 24  
* ndc [id number]  
FUV in office today for dysphagia, patient had manometry testing and PH testing.
Patient is wondering if he needs to continue taking pantoprazole.PT HERE FOR 
LEFT THUMB PAIN AND LOCKING. STATES LOCKING FOR THE PAST 5 MONTHS. POPPING AND 
CRACKING. LOCKING HAS BECOME CONSTANT WITH INCREASED PAIN AFTERWARD. DENIES 
TREATMENT FOR THE THUMB IN THE PAST. REFERRED BY DR. JONES.FUV in office today 
for pain in chest and dysphagia. Patient states that he has a lot of sinus drain
age and is seeing an ENT in [place] for this who is going to be completing 
allergy testing.preop evaluation* Pt. here for Prolia injection  
* 1ml (60mg) given subcu. right upper arm  
* pt. tolerated well and no adverse reaction  
* Mfg. [employer]  
* Lot [id number]  
* Exp 10/31/2024  
* NDC [id number]  
Post-op) as he is 9 days status post trigger release of the left thumb on 
2022. He denies any significant pain at this time and denies clicking or 
catching of the thumb.Post-op) as he is 9 days status post trigger release of 
the left thumb on 2022. He denies any significant pain at this time and 
denies clicking or catching of the thumb.Neck pain x 2 days - pt did state that 
he fell on Saturday and the pain started  but doesn't think this is 
correlated.Pt. c/o burning with urination and frequency x 1 week.skin lesionsBCC
removalSuture removalAWV and medck  
  
Reason for Referral  
  
  
                          Specialty    Diagnoses / Procedures Referred By Contac  
t Referred To Contact  
   
                                                              
  
  
Diagnoses  
  
  
Shortness of breath on   
exertion  
  
  
Atrial fibrillation,   
unspecified type (CMS/HCC)  
  
  
  
Procedures  
  
  
ECG 12 lead (Clinic   
Performed)                                
  
  
Kaley Vicente, APRN-CNP  
  
  
 [address]  
  
  
[facility], [address]  
  
  
[place] [id number]  
  
  
Phone: [phone]  
  
  
Fax: [phone]                         
  
  
  
  
  
                    Referral ID Status    Reason    Start Date Expiration Date V  
isits   
Requested                               Visits   
Authorized  
   
                [id number]  Authorized         2023 1       1  
  
  
  
Additional Source Comments  
  
  
  
                                                    PREGNANCY (unrecognized sect  
ion and content)  
   
                                                    No Pregnancy Status Records   
FoundNo Pregnancy Status Records FoundNo Pregnancy   
Status   
Records FoundNo Pregnancy Status Records FoundNo Pregnancy Status Records 
FoundNo   
Pregnancy Status Records FoundNo Pregnancy Status Records FoundNo Pregnancy 
Status   
Records FoundNo Pregnancy Status Records FoundNo Pregnancy Status Records 
FoundNo   
Pregnancy Status Records FoundNo Pregnancy Status Records FoundNo Pregnancy 
Status   
Records Found  
  
  
  
  
                                                    INFORMATION SOURCE (unrecogn  
ized section and content)  
   
                                          
  
  
  
                                        DATE CREATED        AUTHOR  
   
                                2019                      [facility]  
  
  
  
                                DATE CREATED    AUTHOR          AUTHOR'S ORGANIZ  
ATION  
   
                                2021                      [facility]  
  
  
  
                                DATE CREATED    AUTHOR          AUTHOR'S ORGANIZ  
ATION  
   
                                10/31/2022                       [facility]  
  
  
  
                                DATE CREATED    AUTHOR          AUTHOR'S ORGANIZ  
ATION  
   
                                2023                      [facility]  
  
  
  
                                DATE CREATED    AUTHOR          AUTHOR'S ORGANIZ  
ATION  
   
                                2023                      [facility]  
  
  
  
                                DATE CREATED    AUTHOR          AUTHOR'S ORGANIZ  
ATION  
   
                                2024                      [facility]  
  
  
  
                                DATE CREATED    AUTHOR          AUTHOR'S ORGANIZ  
ATION  
   
                                2024                      [facility]  
  
  
  
                                DATE CREATED    AUTHOR          AUTHOR'S ORGANIZ  
ATION  
   
                                2024                      [facility]  
  
  
  
                                DATE CREATED    AUTHOR          AUTHOR'S ORGANIZ  
ATION  
   
                                2024                      [facility]  
  
  
  
                                DATE CREATED    AUTHOR          AUTHOR'S ORGANIZ  
ATION  
   
                                2025                      [facility]  
  
  
  
                                DATE CREATED    AUTHOR          AUTHOR'S ORGANIZ  
ATION  
   
                                2025                      [facility]  
s  
  
  
  
                                DATE CREATED    AUTHOR          AUTHOR'S ORGANIZ  
ATION  
   
                                2025                      [facility]  
  
  
  
                                DATE CREATED    AUTHOR          AUTHOR'S ORGANIZ  
ATION  
   
                                2025                      [facility]  
nter  
  
  
  
  
  
                                                    Reason for Visit (unrecogniz  
ed section and content)  
   
                                          
  
  
  
                                        Reason              Comments  
   
                                        Headache            Pt c/o headache for   
several days  
  
  
  
                          Specialty    Diagnoses / Procedures Referred By Contac  
t Referred To Contact  
   
                                                              
  
  
Diagnoses  
  
  
Shortness of breath on   
exertion  
  
  
Atrial fibrillation,   
unspecified type (CMS/HCC)  
  
  
  
Procedures  
  
  
ECG 12 lead (Clinic   
Performed)                                
  
  
Kaley Vicente, APRN-CNP  
  
  
[address]  
  
  
[facility], [place] [address]  
  
  
Phone: [phone]  
  
  
Fax: [phone]                         
  
  
  
  
  
                    Referral ID Status    Reason    Start Date Expiration Date V  
isits   
Requested                               Visits   
Authorized  
   
                [id number]  Authorized         2023 1       1  
  
  
  
                                        Reason              Comments  
   
                                        Med Management      Multiple concerns  
  
  
  
                                        Reason              Comments  
   
                                        Medicare Annual Wellness Visit Subsequen  
t   
  
  
  
                          Specialty    Diagnoses / Procedures Referred By Contac  
t Referred To Contact  
   
                                        Primary Care          
  
  
Diagnoses  
  
  
COPD, severe (CMS/HCC)  
  
  
  
Procedures  
  
  
Follow Up In Primary Care -   
Established                               
  
  
Jake Jones MD  
  
  
 [address]  
  
  
[facility], [place] [address]  
  
  
Phone: [phone]  
  
  
Fax: [phone]                         
  
  
  
  
  
                Referral ID Status  Reason  Start Date Expiration Date Visits Re  
quested Visits   
Authorized  
   
                [id number]  Closed          2023 1       1  
  
  
  
                                        Reason              Comments  
   
                                        Follow-up           Check spots on leg  
  
  
  
                                        Reason              Comments  
   
                                        Osteoporosis          
  
  
  
                          Specialty    Diagnoses / Procedures Referred By Contac  
t Referred To Contact  
   
                                        Endocrinology         
  
  
Diagnoses  
  
  
Osteoporosis, unspecified   
osteoporosis type,   
unspecified pathological   
fracture presence  
                                          
  
  
Eber Bazan, DO  
  
  
[address] [place] [id number]  
  
  
Phone:   
tel:[phone]  
  
  
fax:[phone]                       
  
  
Jonathan Levy CNP  
  
  
[address] [address]  
  
  
Phone:   
tel:[phone]  
  
  
fax:[phone]  
  
  
  
                    Referral ID Status    Reason    Start Date Expiration Date V  
isits   
Requested                               Visits   
Authorized  
   
                                [id number]        Closed            
  
  
Specialty   
Services   
Required/Karley  
[facility]   
Interest        10/1/2024       10/1/2025       1               1  
  
  
  
                                        Reason              Comments  
   
                                        Cough                 
   
                                        Shortness of Breath X's 2 weeks  
  
  
  
                                        Reason              Comments  
   
                                        Med Management      ER follow up for nec  
k pain, pharyngitis, look at spots on leg  
  
  
  
                          Specialty    Diagnoses / Procedures Referred By Contac  
t Referred To Contact  
   
                                        Primary Care          
  
  
Diagnoses  
  
  
Elevated PSA  
  
  
  
Procedures  
  
  
Follow Up In Primary Care -   
Established                               
  
  
Jake Jones MD  
  
  
 [address]  
  
  
[facility], [place] [address]  
  
  
Phone: [phone]  
  
  
Fax: [phone]                         
  
  
  
  
  
                    Referral ID Status    Reason    Start Date Expiration Date V  
isits   
Requested                               Visits   
Authorized  
   
                [id number] Authorized         1/10/2024 2025 1       1  
  
  
  
                                        Reason              Comments  
   
                                        New Patient         SINUS DRAINAGE AND C  
OUGH X2 MONTHS  
  
  
  
                                Reason          Onset Date      Comments  
   
                                [facility] OUTREACH    2025      APPT ON 20  
  
  
  
                                Reason          Onset Date      Comments  
   
                                Medicare Wellness Visit                 Needs re  
fills  
   
                                Fall Risk Screening 2025        
  
  
  
                                        Reason              Comments  
   
                                        Speech Therapy        
  
  
  
                          Specialty    Diagnoses / Procedures Referred By Contac  
t Referred To Contact  
   
                                        Rehabilitation        
  
  
Diagnoses  
  
  
Oropharyngeal dysphagia  
                                          
  
  
Sabino Mahoney MD  
  
  
[address] [facility]  
  
  
[address] [place] [address]  
  
  
Phone:   
tel:[phone]  
  
  
fax:[phone]                       
  
  
[facility]   
Speech Therapy  
  
  
[address] [place]   
[address]  
  
  
Phone:   
tel:[phone]  
  
  
fax:[phone]  
  
  
  
                Referral ID Status  Reason  Start Date Expiration Date Visits Re  
quested Visits   
Authorized  
   
                [id number] Closed          2025 1       1  
  
  
  
  
  
                                                    Care Teams (unrecognized sec  
tion and content)  
   
                                          
  
  
  
                      Team Member Relationship Specialty  Start Date End Date  
   
                                                      
  
  
Jake Jones MD  
  
  
NPI: [id number]  
  
  
 [address]  
  
  
[facility] [address]  
  
  
[place] [address]  
  
  
[phone] (Work)  
  
  
[phone] (Fax) PCP - General                   9/3/19            
  
  
  
                      Team Member Relationship Specialty  Start Date End Date  
   
                                                      
  
  
Jake Jones MD  
  
  
NPI: [id number]  
  
  
 [address]  
  
  
[facility], [address]  
  
  
[place] [address]  
  
  
[phone] (Work)  
  
  
[phone] (Fax) PCP - General                   9/3/19            
   
                                                      
  
  
Jake Jones MD  
  
  
NPI: [id number]  
  
  
 [address]  
  
  
[facility], [address]  
  
  
[place] [address]  
  
  
[phone] (Work)  
  
  
[phone] (Fax)                      PCP - [employer] PCP                           23                
  
  
  
                      Team Member Relationship Specialty  Start Date End Date  
   
                                                      
  
  
Jake Jones MD  
  
  
NPI: [id number]  
  
  
 [address]  
  
  
[facility], [address]  
  
  
[place] [id number]  
  
  
[phone] (Work)  
  
  
[phone] (Fax) PCP - General                   9/3/19            
   
                                                      
  
  
Jake Jones MD  
  
  
NPI: [id number]  
  
  
 [address]  
  
  
[facility], [facility] [address]  
  
  
[place] [id number]  
  
  
[phone] (Work)  
  
  
[phone] (Fax)                      PCP - [employer] PCP                           23                
  
  
  
                      Team Member Relationship Specialty  Start Date End Date  
   
                                                      
  
  
Jake Jones MD  
  
  
NPI: [id number]  
  
  
 [address]  
  
  
[facility], [facility] [address]  
  
  
[place] [id number]  
  
  
[phone] (Work)  
  
  
[phone] (Fax) PCP - General                   9/3/19            
   
                                                      
  
  
Jake Jones MD  
  
  
NPI: [id number]  
  
  
 [address]  
  
  
[facility], [facility] [address]  
  
  
[place] [id number]  
  
  
[phone] (Work)  
  
  
[phone] (Fax)                      PCP - [employer] PCP                           23                
  
  
  
                      Team Member Relationship Specialty  Start Date End Date  
   
                                                      
  
  
Eber Bazan DO  
  
  
NPI: [id number]  
  
  
[address] [place] [id number]  
  
  
[phone] (Work)  
  
  
[phone] (Fax) PCP - General   Family Medicine 10/1/24           
  
  
  
                      Team Member Relationship Specialty  Start Date End Date  
   
                                                      
  
  
Eber Bazan DO  
  
  
NPI: [id number]  
  
  
[address] [place] [address]  
  
  
[phone] (Work)  
  
  
[phone] (Fax) PCP - General   Family Medicine 10/1/24           
  
  
  
                      Team Member Relationship Specialty  Start Date End Date  
   
                                                      
  
  
Eber Bazan DO  
  
  
NPI: [id number]  
  
  
[address] [place] [address]  
  
  
[phone] (Work)  
  
  
[phone] (Fax) PCP - General   Family Medicine 10/1/24           
  
  
  
                      Team Member Relationship Specialty  Start Date End Date  
   
                                                      
  
  
Eber Bazan DO  
  
  
NPI: [id number]  
  
  
[address] [place] [address]  
  
  
[phone] (Work)  
  
  
[phone] (Fax) PCP - General   Family Medicine 10/1/24           
  
  
  
                      Team Member Relationship Specialty  Start Date End Date  
   
                                                      
  
  
Jake Jones MD  
  
  
NPI: [id number]  
  
  
 [address]  
  
  
[facility], [facility] [address]  
  
  
[place] [address]  
  
  
[phone] (Work)  
  
  
[phone] (Fax) PCP - General                   9/3/19            
  
  
  
                      Team Member Relationship Specialty  Start Date End Date  
   
                                                      
  
  
Jake Jones MD  
  
  
NPI: [id number]  
  
  
 [address]  
  
  
[facility], [facility] [address]  
  
  
[place] [id number]  
  
  
[phone] (Work)  
  
  
[phone] (Fax) PCP - General                   9/3/19            
  
  
  
                      Team Member Relationship Specialty  Start Date End Date  
   
                                                      
  
  
Eber aBzan DO  
  
  
NPI: [id number]  
  
  
[address] [place] [address]  
  
  
[phone] (Work)  
  
  
[phone] (Fax) PCP - General   Family Medicine 10/1/24           
  
  
  
                      Team Member Relationship Specialty  Start Date End Date  
   
                                                      
  
  
Eber Bazan DO  
  
  
NPI: [id number]  
  
  
[address] [place] [address]  
  
  
[phone] (Work)  
  
  
[phone] (Fax) PCP - General   Family Medicine 10/1/24           
  
  
  
                      Team Member Relationship Specialty  Start Date End Date  
   
                                                      
  
  
Eber Bazan DO  
  
  
NPI: [id number]  
  
  
[address] [place] [address]  
  
  
[phone] (Work)  
  
  
[phone] (Fax) PCP - General   Family Medicine 10/1/24           
  
  
  
                      Team Member Relationship Specialty  Start Date End Date  
   
                                                      
  
  
Eber Bazan DO  
  
  
NPI: [id number]  
  
  
[address] [place] [address]  
  
  
[phone] (Work)  
  
  
[phone] (Fax) PCP - General   Family Medicine 10/1/24           
  
  
  
                      Team Member Relationship Specialty  Start Date End Date  
   
                                                      
  
  
Eber Bazan DO  
  
  
NPI: [id number]  
  
  
[address] [place] [address]  
  
  
[phone] (Work)  
  
  
[phone] (Fax) PCP - General   Family Medicine 10/1/24           
  
  
  
                      Team Member Relationship Specialty  Start Date End Date  
   
                                                      
  
  
Eber Bazan DO  
  
  
NPI: [id number]  
  
  
[facility] [place] [address]  
  
  
[phone] (Work)  
  
  
[phone] (Fax) PCP - General   Family Medicine 10/1/24           
  
  
FOR RECORDS PERTAINING TO PATIENTS WHO ARE OR HAVE BEEN ENROLLED IN A CHEMICAL 
DEPENDENCY/SUBSTANCEABUSE PROGRAM, SOME INFORMATION MAY BE OMITTED. This 
clinical summary was aggregated from multiple sources. Caution should be 
exercised in using it in the provision of clinical care. This summary normalizes
information from multiple sources, and as a consequence, information in this 
document may materially change the coding, format and clinical context of 
patient data. In addition, data may be omitted in some cases. CLINICAL DECISIONS
SHOULD BE BASED ON THE PRIMARY CLINICAL RECORDS. [email or web address] provides 
no warranty or guarantee of the accuracy or completeness of information in this 
document.

## 2025-03-25 ENCOUNTER — HOSPITAL ENCOUNTER (OUTPATIENT)
Dept: HOSPITAL 100 - LAB | Age: 80
Discharge: HOME | End: 2025-03-25
Payer: MEDICARE

## 2025-03-25 DIAGNOSIS — R97.20: Primary | ICD-10-CM

## 2025-03-25 LAB — PSA,TOTAL- DIAGNOSTIC: 5.99 NG/ML (ref 0–4)

## 2025-03-25 PROCEDURE — 36415 COLL VENOUS BLD VENIPUNCTURE: CPT

## 2025-03-25 PROCEDURE — 84153 ASSAY OF PSA TOTAL: CPT
